# Patient Record
Sex: FEMALE | Race: WHITE | Employment: OTHER | URBAN - METROPOLITAN AREA
[De-identification: names, ages, dates, MRNs, and addresses within clinical notes are randomized per-mention and may not be internally consistent; named-entity substitution may affect disease eponyms.]

---

## 2017-02-13 ENCOUNTER — OFFICE VISIT (OUTPATIENT)
Dept: AUDIOLOGY | Facility: CLINIC | Age: 77
End: 2017-02-13

## 2017-02-13 DIAGNOSIS — H90.3 SENSORY HEARING LOSS, BILATERAL: Primary | ICD-10-CM

## 2017-02-13 NOTE — PROGRESS NOTES
AUDIOLOGY REPORT    BACKGROUND INFORMATION: Dr. Luis Dillon implanted Negin Magallon with a right  Nucleus  cochlear implant 08/2/2010 but had device failure, was explanted and reimplanted on 06/05/2011.  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 The patient was last seen for programming advice with investigation by Melissa De Jesus from [employer] on 07/02/2012.  She is being seen 2/13/2017 in Audiology at the [facility] [place] for follow up programming with 910 processor.  She was last seen 10/17/2016 for upgrading processors.     PATIENT REPORT: In noise her processor shuts down and she is not hearing voices as clearly as she would like. She would like for us to remove Wind Block in all programs. She acknowledged lots of previous problems, but over time she acclimated    PROBLEM LISTS AND TRIALS:  1.  First implant:  After a few months-November 2010, she noted a loud sound and tinnitus with and without the implant, and she felt she had problems with perception and consistency of sound since then. She had frequent complaints that clanging plates sounded uncomfortable and intermittency of sound. She had a device failure documented in May 2011.  2.  Second implant:  She was initially programmed 6/14/2011.    7/5/11- One week later, she noted interruptions of sound.  Normal electrode impedances.  8/2/11 Notes intermittency and can no longer hear birds.  Sera De Jesus from [employer] came for investigation and assistance in programming.  Low frequency electrode impedances are reducing.  8/15/11 Seems to hear better after programming.  Electrodes 15-22 impedances are low.  9/13/11 Notes a humming sound, decreased  performance, and intermittency.  Good speech perception results for 3 months review  1/31/12 Notes intermittency and dizziness  2/20/12 Notes a humming sound, intermittency, and feels her benefit has diminished.  She also notes a numbness in her head.  ENT evaluation was recommended.  Improving speech perception results for 6 month review  5/21/12 Feels the implant is too loud, hissing sound, and not clear.  Reprogrammed and she left liking the sound.  Trials with frequency tables, with no consistent perception  6/5/12 She dislikes the new program, and notes a screeching sound and continued intermittency.  Electrode 1-3 disabled.  She notes a feeling in her neck for electrode 1 when doing NRT  7/2/2012 Programming seemed to resolve much of the problems    FITTING SESSION: Dr. Luis Dillon, cochlear implant surgeon, ordered today's appointment. The patient came to the clinic for adjustment to the programs in the external speech processor and for assessment of the external components of the cochlear implant system. These components provide power and data to the internal device. Sound is only heard once the external portion is activated. Postoperative treatment, including device fitting and adjustment, audiologic assessments, and training are required at regular intervals.        Programming included psychophysical measures of comfort, threshold, and loudness balance, and adjustments of frequency tables and electrodes.  Processor type: 910      Headpiece type: Standard   Magnet strength: 2    TEST RESULTS:    Electrode Impedances: stable  Neural Response Testing: DNT  Facial Stimulation: Absent  Tinnitus: Present--Present  Balance Problems: Occasional from the Menieres disease, no longer taking medications for it  Pain/Discomfort: Occasional at   Strategies Tried: Turning off electrodes 15-22, but she hated the sound.  Enabling electrodes 15-22 and reprogramming and trials with frequency tables.  She preferred table  21.  Patient was remapped, C and T levels re-measured. Felt new program was more clear. Wind removed from all programs.  Programs 910 Processor: Noise and Wind disabled in all programs  1. 38 HOME   2. 38 CAFE adaptive directional   3.  38 GROUPS fixed directional  4. 38 HOME  Number of Channels per Program: 2-22 enabled.    The new program 40 was installed in the 810 processor with similar adjustments to previously used   1. 40new map, with standard FAT 21 ([id number]); everyday (adro with autosensitivity)  2.40 new map, Noise adaptive directional  3. 40 newwith standard FAT 21 ([id number]); everyday (adro with autosensitivity)   4. 40 new Noise, adaptive directional    20 minutes were spent assessing the patient s auditory rehabilitation status.  Today s evaluation was ordered by Dr. Dillon.  Device(s) used for Testing: N6    Soundfield Thresholds: Appropriate normal to mild hearing loss levels    CNC Words Test:  The patient repeats 25 single syllable words, auditory only. The words are presented at 60 dB SPL (conversational level) delivered from a CD player.    Preoperative Performance:  Right ear aided: 0%    3 months Post-Activation of CI: Right: 62 % (52 % with the previous device)    6 months Post-Activation:  68 %    12 Months Post-Activation: 64 %    5 Years Post-Activation:  60% new; and 40% previous map    ~6 years Post-Activation (Today): 60%    The Hearing in Noise Test (HINT):  The patient repeats 20 sentences, auditory only.   The sentences are presented in each condition at 60 dB SPL (conversational level)delivered from a CD player.    Preoperative Performance:  Preoperative Performance:  Right ear aided: 0%    3 months Post-Activation of CI: Right: 89 % quiet  67 % in S/N 10 (41 % with the previous device)    6 months Post-Activation:  DNT    12 Months Post-Activation: 88 %    5 Years:  DNT    ~6 years Post-Activation (Today): DNT     [employer] Sentences Test:  The patient repeats 20 sentences, auditory  only.  The sentences are presented at 60 dB SPL (conversational level) delivered from a CD player.    Preoperative Performance:  Right ear aided: DNT  3 months Post-Activation of CI: Right: 71 %    6 months Post-Activation:  82 %    12 Months Post-Activation:78 % quiet; 57 % in noise (signal/noise 10)    5 Years Post-Activation:  77% new and 60% previous    ~6 years Post-Activation (Today): 59%  This examiner is not sure why the decrease but it may be due to the change today.    SUMMARY AND RECOMMENDATIONS: Ms Magallon was seen for follow-up programming with her new 910 processor. She continues to report slight issues with clarity, and found some relief when WindBlock was removed. She did not want to try SCAN today.      Danna Simons M.A.  Audiology Extern  Temporary License #[id number]     Danyell Sevilla., CCC-A  Licensed Audiologist  [place] #[id number]

## 2017-02-13 NOTE — MR AVS SNAPSHOT
After Visit Summary   2/13/2017    Negin Magallon    MRN: [id number]           Patient Information     Date Of Birth          1940        Visit Information        Provider Department      2/13/2017 9:30 AM Annette Colbert, Amee [facility] Audiology        Today's Diagnoses     Sensory hearing loss, bilateral    -  1       Follow-ups after your visit        Who to contact     Please call your clinic at [phone] to:    Ask questions about your health    Make or cancel appointments    Discuss your medicines    Learn about your test results    Speak to your doctor   If you have compliments or concerns about an experience at your clinic, or if you wish to file a complaint, please contact [facility] Patient Relations at [phone] or email us at [email or web address]         Additional Information About Your Visit        MyChart Information     [employer] gives you secure access to your electronic health record. If you see a primary care provider, you can also send messages to your care team and make appointments. If you have questions, please call your primary care clinic.  If you do not have a primary care provider, please call [phone] and they will assist you.      [employer] is an electronic gateway that provides easy, online access to your medical records. With [employer], you can request a clinic appointment, read your test results, renew a prescription or communicate with your care team.     To access your existing account, please contact your [facility] or call [phone] for assistance.        Care EveryWhere ID     This is your Care EveryWhere ID. This could be used by other organizations to access your [facility] medical records  [id number]         Blood Pressure from Last 3 Encounters:   10/05/16 142/68   06/10/16 172/73   04/20/16 119/61    Weight from Last 3 Encounters:   10/05/16 69.4 kg (153 lb)   04/20/16 68.5  kg (151 lb)   03/25/16 68.5 kg (151 lb)              We Performed the Following     AUDIOGRAM/TYMPANOGRAM - INTERFACE     Eval of Aural Rehab Status (less than 31 min) (55657)     RT: Diagnostic Analysis of CI 7 yrs & over, Subsequent Programming   (99280)        Primary Care Provider Office Phone # Fax #    Ida Bryan Reveles [phone]       [facility] [address]  [facility] [address]        Thank you!     Thank you for choosing [facility] AUDIOLOGY  for your care. Our goal is always to provide you with excellent care. Hearing back from our patients is one way we can continue to improve our services. Please take a few minutes to complete the written survey that you may receive in the mail after your visit with us. Thank you!             Your Updated Medication List - Protect others around you: Learn how to safely use, store and throw away your medicines at www.disposemymeds.org.          This list is accurate as of: 2/13/17 11:59 PM.  Always use your most recent med list.                   Brand Name Dispense Instructions for use    CALCIUM + D PO      petite 400/500 2 daily       levothyroxine 50 MCG tablet    SYNTHROID/LEVOTHROID    90 tablet    Take 1 tablet (50 mcg) by mouth daily       meclizine 25 MG tablet    ANTIVERT    180 tablet    Take 1 tablet (25 mg) by mouth 2 times daily       metoprolol 25 MG tablet    LOPRESSOR    180 tablet    Take 1 tablet (25 mg) by mouth 2 times daily       STATIN NOT PRESCRIBED (INTENTIONAL)      by Other route continuous prn.       TYLENOL 325 MG tablet   Generic drug:  acetaminophen      Take 1-2 tablets by mouth every 6 hours as needed. As needed

## 2017-03-08 ENCOUNTER — OFFICE VISIT (OUTPATIENT)
Dept: FAMILY MEDICINE | Facility: CLINIC | Age: 77
End: 2017-03-08
Payer: COMMERCIAL

## 2017-03-08 VITALS
WEIGHT: 159 LBS | BODY MASS INDEX: 27.14 KG/M2 | SYSTOLIC BLOOD PRESSURE: 155 MMHG | OXYGEN SATURATION: 98 % | HEIGHT: 64 IN | HEART RATE: 56 BPM | TEMPERATURE: 98 F | DIASTOLIC BLOOD PRESSURE: 69 MMHG

## 2017-03-08 DIAGNOSIS — Z78.0 ASYMPTOMATIC POSTMENOPAUSAL STATUS: ICD-10-CM

## 2017-03-08 DIAGNOSIS — I10 HYPERTENSION GOAL BP (BLOOD PRESSURE) < 140/90: ICD-10-CM

## 2017-03-08 DIAGNOSIS — E03.9 HYPOTHYROIDISM, UNSPECIFIED TYPE: Primary | ICD-10-CM

## 2017-03-08 LAB
ALBUMIN SERPL-MCNC: 3.9 G/DL (ref 3.4–5)
ALP SERPL-CCNC: 111 U/L (ref 40–150)
ALT SERPL W P-5'-P-CCNC: 25 U/L (ref 0–50)
ANION GAP SERPL CALCULATED.3IONS-SCNC: 4 MMOL/L (ref 3–14)
AST SERPL W P-5'-P-CCNC: 14 U/L (ref 0–45)
BILIRUB SERPL-MCNC: 0.4 MG/DL (ref 0.2–1.3)
BUN SERPL-MCNC: 17 MG/DL (ref 7–30)
CALCIUM SERPL-MCNC: 9.3 MG/DL (ref 8.5–10.1)
CHLORIDE SERPL-SCNC: 102 MMOL/L (ref 94–109)
CO2 SERPL-SCNC: 32 MMOL/L (ref 20–32)
CREAT SERPL-MCNC: 0.96 MG/DL (ref 0.52–1.04)
CREAT UR-MCNC: 24 MG/DL
GFR SERPL CREATININE-BSD FRML MDRD: 57 ML/MIN/1.7M2
GLUCOSE SERPL-MCNC: 96 MG/DL (ref 70–99)
MICROALBUMIN UR-MCNC: 30 MG/L
MICROALBUMIN/CREAT UR: 127.85 MG/G CR (ref 0–25)
POTASSIUM SERPL-SCNC: 4.9 MMOL/L (ref 3.4–5.3)
PROT SERPL-MCNC: 7.1 G/DL (ref 6.8–8.8)
SODIUM SERPL-SCNC: 138 MMOL/L (ref 133–144)
TSH SERPL DL<=0.005 MIU/L-ACNC: 2.32 MU/L (ref 0.4–4)

## 2017-03-08 PROCEDURE — 80053 COMPREHEN METABOLIC PANEL: CPT | Performed by: FAMILY MEDICINE

## 2017-03-08 PROCEDURE — 82043 UR ALBUMIN QUANTITATIVE: CPT | Performed by: FAMILY MEDICINE

## 2017-03-08 PROCEDURE — 99214 OFFICE O/P EST MOD 30 MIN: CPT | Performed by: FAMILY MEDICINE

## 2017-03-08 PROCEDURE — 84443 ASSAY THYROID STIM HORMONE: CPT | Performed by: FAMILY MEDICINE

## 2017-03-08 PROCEDURE — 36415 COLL VENOUS BLD VENIPUNCTURE: CPT | Performed by: FAMILY MEDICINE

## 2017-03-08 RX ORDER — LOSARTAN POTASSIUM 25 MG/1
25 TABLET ORAL DAILY
Qty: 30 TABLET | Refills: 0 | Status: SHIPPED | OUTPATIENT
Start: 2017-03-08 | End: 2017-03-29

## 2017-03-08 RX ORDER — LEVOTHYROXINE SODIUM 50 UG/1
50 TABLET ORAL DAILY
Qty: 90 TABLET | Refills: 3 | Status: SHIPPED | OUTPATIENT
Start: 2017-03-08 | End: 2018-04-26

## 2017-03-08 NOTE — PROGRESS NOTES
SUBJECTIVE:                                                    Negin Magallon is a 77 year old female who presents to clinic today for the following health issues:    Hypothyroidism Follow-up      Since last visit, patient describes the following symptoms: Weight stable, no hair loss, no skin changes, no constipation, no loose stools       Amount of exercise or physical activity: no     Problems taking medications regularly: No    Medication side effects: none  Diet: regular (no restrictions)    Hypertension Follow-up      Outpatient blood pressures are being checked at home.  Results are [id number] sometimes/ 70s.    Low Salt Diet: no added salt       Problem list and histories reviewed & adjusted, as indicated.  Additional history: as documented    Recent Labs   Lab Test  03/08/17   1303  04/15/16   0916  03/18/16   1223  03/16/15   1613   10/19/12   0837   05/14/12   0904   09/27/11   1511   03/10/11   0835   04/07/09   1014   A1C   --    --    --    --    --   5.7   --   6.0   --    --    --   5.7   --    --    LDL   --    --    --    --    --   212*   --    --    --   188*   --    --    --   Cannot estimate LDL when triglyceride exceeds 400 mg/dL  209*   HDL   --    --    --    --    --   41*   --    --    --   40*   --    --    --   46*   TRIG   --    --    --    --    --   310*   --    --    --   355*   --   400*   --   436*   ALT  25   --   15  21   --    --    --    --    --    --    --    --    --    --    CR  0.96  0.98  1.08*  0.78   < >   --    < >  0.97   --   0.90   --   1.15*   < >  0.98   GFRESTIMATED  57*  55*  50*  72   < >   --    < >  57*   --   62   --   47*   < >  57*   GFRESTBLACK  68  67  60*  88   < >   --    < >  69   --   75   --   57*   < >  69   POTASSIUM  4.9  4.4  4.2  4.1   < >   --    < >  4.3   --   4.3   < >  4.4   < >  4.3   TSH  2.32  1.66  1.61  1.02   < >   --    --    --    < >   --    --   2.68   < >  0.27*    < > = values in this interval not displayed.      BP  "Readings from Last 3 Encounters:   03/08/17 155/69   10/05/16 142/68   06/10/16 172/73    Wt Readings from Last 3 Encounters:   03/08/17 159 lb (72.1 kg)   10/05/16 153 lb (69.4 kg)   04/20/16 151 lb (68.5 kg)                    Reviewed and updated as needed this visit by clinical staff  Tobacco  Allergies  Meds  Med Hx  Surg Hx  Fam Hx  Soc Hx      Reviewed and updated as needed this visit by Provider         ROS:  Constitutional, HEENT, cardiovascular, pulmonary, gi and gu systems are negative, except as otherwise noted.    OBJECTIVE:                                                    /69  Pulse 56  Temp 98  F (36.7  C) (Oral)  Ht 5' 4\" (1.626 m)  Wt 159 lb (72.1 kg)  SpO2 98%  BMI 27.29 kg/m2  Body mass index is 27.29 kg/(m^2).  GENERAL: healthy, alert and no distress  RESP: lungs clear to auscultation - no rales, rhonchi or wheezes  CV: regular rate and rhythm, normal S1 S2, no S3 or S4, no murmur, click or rub, no peripheral edema and peripheral pulses strong  ABDOMEN: soft, nontender, no hepatosplenomegaly, no masses and bowel sounds normal  MS: no gross musculoskeletal defects noted, no edema       ASSESSMENT/PLAN:                                                        ICD-10-CM    1. Hypothyroidism, unspecified type E03.9 TSH with free T4 reflex     levothyroxine (SYNTHROID/LEVOTHROID) 50 MCG tablet   2. Hypertension goal BP (blood pressure) < 140/90 I10 Comprehensive metabolic panel (BMP + Alb, Alk Phos, ALT, AST, Total. Bili, TP)     Albumin Random Urine Quantitative     losartan (COZAAR) 25 MG tablet   3. Asymptomatic postmenopausal status Z78.0 DEXA HIP/PELVIS/SPINE - Future     Continue metoprolol, losartan added.   F/u in 2 weeks with RN for BP recheck.     Awaiting lab results.       Ida Reveles MD  [facility]  "

## 2017-03-08 NOTE — NURSING NOTE
"No chief complaint on file.      Initial /74  Pulse 61  Temp 98  F (36.7  C) (Oral)  Ht 5' 4\" (1.626 m)  Wt 159 lb (72.1 kg)  SpO2 98%  BMI 27.29 kg/m2 Estimated body mass index is 27.29 kg/(m^2) as calculated from the following:    Height as of this encounter: 5' 4\" (1.626 m).    Weight as of this encounter: 159 lb (72.1 kg).  Medication Reconciliation: complete  Yuridia Kim MA    "

## 2017-03-08 NOTE — MR AVS SNAPSHOT
After Visit Summary   3/8/2017    Negin Magallon    MRN: [id number]           Patient Information     Date Of Birth          1940        Visit Information        Provider Department      3/8/2017 12:00 PM Ida Reveles MD [facility]        Today's Diagnoses     Hypothyroidism, unspecified type    -  1    Hypertension goal BP (blood pressure) < 140/90        Asymptomatic postmenopausal status           Follow-ups after your visit        Future tests that were ordered for you today     Open Future Orders        Priority Expected Expires Ordered    DEXA HIP/PELVIS/SPINE - Future Routine  3/8/2018 3/8/2017            Who to contact     If you have questions or need follow up information about today's clinic visit or your schedule please contact [facility] directly at [phone].  Normal or non-critical lab and imaging results will be communicated to you by MyChart, letter or phone within 4 business days after the clinic has received the results. If you do not hear from us within 7 days, please contact the clinic through MyChart or phone. If you have a critical or abnormal lab result, we will notify you by phone as soon as possible.  Submit refill requests through [employer] or call your pharmacy and they will forward the refill request to us. Please allow 3 business days for your refill to be completed.          Additional Information About Your Visit        MyChart Information     [employer] gives you secure access to your electronic health record. If you see a primary care provider, you can also send messages to your care team and make appointments. If you have questions, please call your primary care clinic.  If you do not have a primary care provider, please call [phone] and they will assist you.        Care EveryWhere ID     This is your Care EveryWhere ID. This could be used by other organizations to access your [facility]  "records  [id number]        Your Vitals Were     Pulse Temperature Height Pulse Oximetry BMI (Body Mass Index)       56 98  F (36.7  C) (Oral) 5' 4\" (1.626 m) 98% 27.29 kg/m2        Blood Pressure from Last 3 Encounters:   03/08/17 155/69   10/05/16 142/68   06/10/16 172/73    Weight from Last 3 Encounters:   03/08/17 159 lb (72.1 kg)   10/05/16 153 lb (69.4 kg)   04/20/16 151 lb (68.5 kg)              We Performed the Following     Albumin Random Urine Quantitative     Comprehensive metabolic panel (BMP + Alb, Alk Phos, ALT, AST, Total. Bili, TP)     TSH with free T4 reflex          Today's Medication Changes          These changes are accurate as of: 3/8/17 12:58 PM.  If you have any questions, ask your nurse or doctor.               Start taking these medicines.        Dose/Directions    losartan 25 MG tablet   Commonly known as:  COZAAR   Used for:  Hypertension goal BP (blood pressure) < 140/90   Started by:  Ida Reveles MD        Dose:  25 mg   Take 1 tablet (25 mg) by mouth daily   Quantity:  30 tablet   Refills:  0            Where to get your medicines      These medications were sent to [id number] - [place], [place] - [address] AT [facility] [address]  [address], [place] [address]     Phone:  [phone]     levothyroxine 50 MCG tablet    losartan 25 MG tablet                Primary Care Provider Office Phone # Fax #    Ida Reveles [phone]       [facility] [address]  [facility] [place] [address]        Thank you!     Thank you for choosing [facility]  for your care. Our goal is always to provide you with excellent care. Hearing back from our patients is one way we can continue to improve our services. Please take a few minutes to complete the written survey that you may receive in the mail after your visit with us. Thank you!             Your Updated Medication " List - Protect others around you: Learn how to safely use, store and throw away your medicines at www.disposemymeds.org.          This list is accurate as of: 3/8/17 12:58 PM.  Always use your most recent med list.                   Brand Name Dispense Instructions for use    CALCIUM + D PO      petite 400/500 2 daily       levothyroxine 50 MCG tablet    SYNTHROID/LEVOTHROID    90 tablet    Take 1 tablet (50 mcg) by mouth daily       losartan 25 MG tablet    COZAAR    30 tablet    Take 1 tablet (25 mg) by mouth daily       meclizine 25 MG tablet    ANTIVERT    180 tablet    Take 1 tablet (25 mg) by mouth 2 times daily       metoprolol 25 MG tablet    LOPRESSOR    180 tablet    Take 1 tablet (25 mg) by mouth 2 times daily       STATIN NOT PRESCRIBED (INTENTIONAL)      by Other route continuous prn.       TYLENOL 325 MG tablet   Generic drug:  acetaminophen      Take 1-2 tablets by mouth every 6 hours as needed. As needed

## 2017-03-09 NOTE — PROGRESS NOTES
Zbigniew Kam,     Your recent labs are attached.   Your thyroid looks GOOD! Continue with the same dose of you levothyroxine.   Sodium, potassium, kidney function and liver function are NORMAL.     You are loosing proteins in urine. Take your blood pressure medications are prescribed. Drink enough water. I will recheck your urine protein level in 6 months during your physical exam.     Let me know if you have any other questions.     Ida Reveles MD.   Family Physician.  [facility].

## 2017-03-28 ENCOUNTER — ALLIED HEALTH/NURSE VISIT (OUTPATIENT)
Dept: NURSING | Facility: CLINIC | Age: 77
End: 2017-03-28
Payer: COMMERCIAL

## 2017-03-28 VITALS — DIASTOLIC BLOOD PRESSURE: 76 MMHG | HEART RATE: 60 BPM | SYSTOLIC BLOOD PRESSURE: 150 MMHG

## 2017-03-28 DIAGNOSIS — I10 HYPERTENSION GOAL BP (BLOOD PRESSURE) < 140/90: Primary | ICD-10-CM

## 2017-03-28 PROCEDURE — 99207 ZZC NO CHARGE NURSE ONLY: CPT

## 2017-03-28 NOTE — PATIENT INSTRUCTIONS
Dr. Reveles will call or send you a [employer] message with the plan regarding your blood pressure.  If we do another check, please bring your home cuff with you so we can verify it's accuracy.

## 2017-03-28 NOTE — Clinical Note
Dr. Reveles, I saw Negin for BP check, started losartan about 2 weeks ago.She did not bring her home cuff with her today but does have readings, is checking 3 times a day: 137/68, 137/62, 124/61, 133/65, 127/67, 127/64, 138/72. She states it is usually higher (150 systolic) on her first try, then she sits a few minutes and gets a better reading. States she has had some shortness of breath and chest tightness and cough since starting losartan, anxiety/stress makes it worse. She will expect a MyChart note advising on plan. Thanks! Sabrina Cisse RN [facility]

## 2017-03-28 NOTE — PROGRESS NOTES
"Indications for Blood Pressure monitoring: elevated, started losartan a couple weeks ago    Questioned patient about current smoking habits.  Pt. quit smoking some time ago.     Signs and Symptoms:   Headaches: No  Chest pain: Yes , reports noticing some chest \"tightness\", declines to call it a pain, and a cough, seems like something migh come up per her report  Shortness of breath: Yes , again, very mild since starting the losartan.  She says her chest tightness and shortness of breath is more pronounced when she is angry or anxious.   Apparently argues with spouse a fair amount  Edema: No  Visual problems: No  Parathesia: No  Epitaxis: No  Dizziness: No    She did not bring her home cuff with her today but does have readings, is checking 3 times a day:  137/68, 137/62, 124/61, 133/65, 127/67, 127/64, 138/72.  She states it is usually higher (150 systolic) on her first try, then she sits a few minutes and gets a better reading.      BP:   BP Readings from Last 1 Encounters:   03/28/17 150/76     60   Checked both arms 2-3 times, BP did not come down.    Diabetic: No  Heart Disease:  No    Patient states her BP was under better control when she was on Hyzaar for her Meneire's disease.                    "

## 2017-03-28 NOTE — MR AVS SNAPSHOT
After Visit Summary   3/28/2017    Negin Magallon    MRN: [id number]           Patient Information     Date Of Birth          1940        Visit Information        Provider Department      3/28/2017 11:00 AM CP RN [facility]        Care Instructions    Dr. Reveles will call or send you a [email or web address] message with the plan regarding your blood pressure.  If we do another check, please bring your home cuff with you so we can verify it's accuracy.        Follow-ups after your visit        Your next 10 appointments already scheduled     Apr 25, 2017 12:00 PM CDT   DX HIP/PELVIS/SPINE with FKDX1   [facility]    [address] [place] [id number] [phone]           Please do not take any of the following 48 hours prior to your exam: vitamins, calcium tablets, antacids.              Who to contact     If you have questions or need follow up information about today's clinic visit or your schedule please contact [facility] directly at [phone].  Normal or non-critical lab and imaging results will be communicated to you by Sina Weibohart, letter or phone within 4 business days after the clinic has received the results. If you do not hear from us within 7 days, please contact the clinic through [employer] or phone. If you have a critical or abnormal lab result, we will notify you by phone as soon as possible.  Submit refill requests through [email or web address] or call your pharmacy and they will forward the refill request to us. Please allow 3 business days for your refill to be completed.          Additional Information About Your Visit        Sina Weibohart Information     [email or web address] gives you secure access to your electronic health record. If you see a primary care provider, you can also send messages to your care team and make appointments. If you have questions, please call your primary care clinic.  If you do not have a primary  care provider, please call [phone] and they will assist you.        Care EveryWhere ID     This is your Care EveryWhere ID. This could be used by other organizations to access your [facility] medical records  [id number]        Your Vitals Were     Pulse                   60            Blood Pressure from Last 3 Encounters:   03/28/17 150/76   03/08/17 155/69   10/05/16 142/68    Weight from Last 3 Encounters:   03/08/17 159 lb (72.1 kg)   10/05/16 153 lb (69.4 kg)   04/20/16 151 lb (68.5 kg)              Today, you had the following     No orders found for display       Primary Care Provider Office Phone # Fax #    Ida Bryan Reveles [phone]       [facility] [address] [facility] [address]        Thank you!     Thank you for choosing [facility]  for your care. Our goal is always to provide you with excellent care. Hearing back from our patients is one way we can continue to improve our services. Please take a few minutes to complete the written survey that you may receive in the mail after your visit with us. Thank you!             Your Updated Medication List - Protect others around you: Learn how to safely use, store and throw away your medicines at www.disposemymeds.org.          This list is accurate as of: 3/28/17 11:23 AM.  Always use your most recent med list.                   Brand Name Dispense Instructions for use    CALCIUM + D PO      petite 400/500 2 daily       levothyroxine 50 MCG tablet    SYNTHROID/LEVOTHROID    90 tablet    Take 1 tablet (50 mcg) by mouth daily       losartan 25 MG tablet    COZAAR    30 tablet    Take 1 tablet (25 mg) by mouth daily       meclizine 25 MG tablet    ANTIVERT    180 tablet    Take 1 tablet (25 mg) by mouth 2 times daily       metoprolol 25 MG tablet    LOPRESSOR    180 tablet    Take 1 tablet (25 mg) by mouth 2 times daily       STATIN NOT PRESCRIBED (INTENTIONAL)      by Other route  continuous prn.       TYLENOL 325 MG tablet   Generic drug:  acetaminophen      Take 1-2 tablets by mouth every 6 hours as needed. As needed

## 2017-03-29 ENCOUNTER — MYC MEDICAL ADVICE (OUTPATIENT)
Dept: FAMILY MEDICINE | Facility: CLINIC | Age: 77
End: 2017-03-29

## 2017-03-29 DIAGNOSIS — I10 HYPERTENSION GOAL BP (BLOOD PRESSURE) < 140/90: ICD-10-CM

## 2017-03-29 NOTE — TELEPHONE ENCOUNTER
It looks like patient should be out of her Losartan from 3/8/17 soon. Called patient to confirm and she states she only has about a week and half worth of Losartan. Patient requesting 3 month supply of Losartan. Rn did mention that BP could be higher in clinic d/t white coat syndrome. Routed to provider to advise on 3 month supply of Losartan and why else patient's BP would also be higher in clinic.    Lucy Barfield RN  [facility]

## 2017-03-30 RX ORDER — LOSARTAN POTASSIUM 25 MG/1
25 TABLET ORAL DAILY
Qty: 90 TABLET | Refills: 0 | Status: SHIPPED | OUTPATIENT
Start: 2017-03-30 | End: 2017-06-14

## 2017-04-25 ENCOUNTER — RADIANT APPOINTMENT (OUTPATIENT)
Dept: BONE DENSITY | Facility: CLINIC | Age: 77
End: 2017-04-25
Attending: FAMILY MEDICINE
Payer: COMMERCIAL

## 2017-04-25 DIAGNOSIS — Z78.0 ASYMPTOMATIC POSTMENOPAUSAL STATUS: ICD-10-CM

## 2017-04-25 PROCEDURE — 77080 DXA BONE DENSITY AXIAL: CPT | Performed by: INTERNAL MEDICINE

## 2017-05-12 NOTE — PROGRESS NOTES
Negin,     Your recent DEXA scan shows osteoporosis.     Continue with calcium and vitamin D daily along with weight bearing exercises:  brisk walking, going up and down the stairs.     I recommend you to be on medication for osteoporosis in addition to above. Jessica schedule appointment to discuss treatment options.     I am going on vacation for next 2 weeks. I can see you in June.     Ida Reveles MD.   Family Physician.  [facility].

## 2017-06-05 ENCOUNTER — OFFICE VISIT (OUTPATIENT)
Dept: AUDIOLOGY | Facility: CLINIC | Age: 77
End: 2017-06-05

## 2017-06-05 DIAGNOSIS — H90.3 SENSORY HEARING LOSS, BILATERAL: Primary | ICD-10-CM

## 2017-06-05 NOTE — PROGRESS NOTES
AUDIOLOGY REPORT    BACKGROUND INFORMATION: Dr. Luis Dillon implanted Negin Magallon with a right  Nucleus  cochlear implant 08/2/2010 but had device failure, was explanted and reimplanted on 06/05/2011.  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 The patient was last seen for programming advice with investigation by Melissa De Jesus from [employer] on 07/02/2012.  She is being seen 6/5/2017 in Audiology at the [facility] for follow up programming with 910 processor.  She was seen 10/17/2016 for upgrading processors.     PATIENT REPORT: Anytime there are motors, the implants shut down.  She tried to replace the microphone covers, but she had problems.  This was the same complaint noted at her February 2017 visit.    PROBLEM LISTS AND TRIALS:  1.  First implant:  After a few months-November 2010, she noted a loud sound and tinnitus with and without the implant, and she felt she had problems with perception and consistency of sound since then. She had frequent complaints that clanging plates sounded uncomfortable and intermittency of sound. She had a device failure documented in May 2011.  2.  Second implant:  She was initially programmed 6/14/2011.    7/5/11- One week later, she noted interruptions of sound.  Normal electrode impedances.  8/2/11 Notes intermittency and can no longer hear birds.  Sera De Jesus from [employer] came for investigation and assistance in programming.  Low frequency electrode impedances are reducing.  8/15/11 Seems to hear better after programming.  Electrodes 15-22 impedances are low.  9/13/11 Notes a humming sound, decreased performance, and intermittency.  Good speech  perception results for 3 months review  1/31/12 Notes intermittency and dizziness  2/20/12 Notes a humming sound, intermittency, and feels her benefit has diminished.  She also notes a numbness in her head.  ENT evaluation was recommended.  Improving speech perception results for 6 month review  5/21/12 Feels the implant is too loud, hissing sound, and not clear.  Reprogrammed and she left liking the sound.  Trials with frequency tables, with no consistent perception  6/5/12 She dislikes the new program, and notes a screeching sound and continued intermittency.  Electrode 1-3 disabled.  She notes a feeling in her neck for electrode 1 when doing NRT  7/2/2012 Programming seemed to resolve much of the problems    2/13/2017 She continues to complain that speech is not clear enough, and the instruments shut down in noise.  The noise and wind circuit were disabled.    Previously tried:  Turning off electrodes 15-22, but she hated the sound.  Enabling electrodes 15-22 and reprogramming and trials with frequency tables.  She preferred table 21.  6/5/2017 As she continues to complain that it shuts down, the Autosensitivity (ACS) was changed to Less 60 as the default was 55.    FITTING SESSION: Dr. Luis Dillon, cochlear implant surgeon, ordered today's appointment. The patient came to the clinic for adjustment to the programs in the external speech processor and for assessment of the external components of the cochlear implant system. These components provide power and data to the internal device. Sound is only heard once the external portion is activated. Postoperative treatment, including device fitting and adjustment, audiologic assessments, and training are required at regular intervals.        Programming included psychophysical measures of comfort, threshold, and loudness balance, and adjustments of frequency tables and electrodes.  Processor type: 910      Headpiece type: Standard   Magnet strength: 2    TEST  RESULTS:    Electrode Impedances: stable  Neural Response Testing: DNT  Facial Stimulation: Absent  Tinnitus: Present--Present  Balance Problems: Occasional from the Menieres disease, no longer taking medications for it  Pain/Discomfort: Occasional at   Strategies Tried:   Patient was remapped, C and T levels re-measured. Felt new program was more clear. ASC changed to Less 60 (from 55)  Programs 910 Processor:   1. 41 HOME   2. 41  CAFE adaptive directional; Noise enabled  3. 41  CAFE adaptive directional; Noise disabled   4. 41 SCAN with Noise and Wind enabled  Number of Channels per Program: 2-22 enabled.    The new program 42 was installed in the 810 processor with similar adjustments to previously used   1. 42 new map, with standard FAT 21 ([id number]); everyday (adro with autosensitivity)  2. 42new map, Noise adaptive directional  3. 42 newwith standard FAT 21 ([id number]); everyday (adro with autosensitivity)   4. 42 new Noise, adaptive directional    10 minutes were spent assessing the patient s auditory rehabilitation status.  Today s evaluation was ordered by Dr. Dillon.  Device(s) used for Testing: N6    Soundfield Thresholds: Appropriate normal to mild hearing loss levels    CNC Words Test:  The patient repeats 25 single syllable words, auditory only. The words are presented at 60 dB SPL (conversational level) delivered from a CD player.    Preoperative Performance:  Right ear aided: 0%    3 months Post-Activation of CI: Right: 62 % (52 % with the previous device)    6 months Post-Activation:  68 %    12 Months Post-Activation: 64 %    5 Years Post-Activation:  60% new; and 40% previous map    ~6 years Post-Activation: 60% DNT    The Hearing in Noise Test (HINT):  The patient repeats 20 sentences, auditory only.   The sentences are presented in each condition at 60 dB SPL (conversational level)delivered from a CD player.    Preoperative Performance:  Preoperative Performance:  Right ear aided: 0%    3  months Post-Activation of CI: Right: 89 % quiet  67 % in S/N 10 (41 % with the previous device)    6 months Post-Activation:  DNT    12 Months Post-Activation: 88 %    5 Years:  DNT    ~6 years Post-Activation (Today): DNT     [employer]Bio Sentences Test:  The patient repeats 20 sentences, auditory only.  The sentences are presented at 60 dB SPL (conversational level) delivered from a CD player.    Preoperative Performance:  Right ear aided: DNT  3 months Post-Activation of CI: Right: 71 %    6 months Post-Activation:  82 %    12 Months Post-Activation:78 % quiet; 57 % in noise (signal/noise 10)    5 Years Post-Activation:  77% new and 60% previous    ~6 years Post-Activation (Today): 59%  This examiner is not sure why the decrease but it may be due to the change today.    6 1/2 years Post-Activation (Today): 79% previous map, and 69% new map, but patient was getting tired of the task    SUMMARY AND RECOMMENDATIONS: Ms Magallon was seen for follow-up programming with her new 910 processor and assessment of rehabilitation status. She feels the new map is much clearer.  She was given instructions on the differences between maps and encouraged to again try SCAN.  Ideally changing ASC to Less 60 will help to diminish the problem in cutting out the noise.  She should return in 1 year or sooner if problems.  Questions were answered.    Betzy Sevilla, CCC-A  Licensed Audiologist  [place] #[id number]

## 2017-06-05 NOTE — MR AVS SNAPSHOT
After Visit Summary   6/5/2017    Negin Magallon    MRN: [id number]           Patient Information     Date Of Birth          1940        Visit Information        Provider Department      6/5/2017 8:30 AM Annette Colbert, Amee [facility]        Today's Diagnoses     Sensory hearing loss, bilateral    -  1      Care Instructions    Follow one year with Annette Colbert 6/6/2018 at 10:30 AM for 90 min.          Follow-ups after your visit        Who to contact     Please call your clinic at [phone] to:    Ask questions about your health    Make or cancel appointments    Discuss your medicines    Learn about your test results    Speak to your doctor   If you have compliments or concerns about an experience at your clinic, or if you wish to file a complaint, please contact [facility] Patient Relations at [phone] or email us at [email or web address]         Additional Information About Your Visit        MyChart Information     Hyperpublic[employer] gives you secure access to your electronic health record. If you see a primary care provider, you can also send messages to your care team and make appointments. If you have questions, please call your primary care clinic.  If you do not have a primary care provider, please call [phone] and they will assist you.      [employer] is an electronic gateway that provides easy, online access to your medical records. With [employer], you can request a clinic appointment, read your test results, renew a prescription or communicate with your care team.     To access your existing account, please contact your [facility] Clinic or call [phone] for assistance.        Care EveryWhere ID     This is your Care EveryWhere ID. This could be used by other organizations to access your [facility] medical records  [id number]         Blood Pressure from Last 3 Encounters:   03/28/17 150/76   03/08/17  155/69   10/05/16 142/68    Weight from Last 3 Encounters:   03/08/17 72.1 kg (159 lb)   10/05/16 69.4 kg (153 lb)   04/20/16 68.5 kg (151 lb)              We Performed the Following     Eval of Aural Rehab Status (less than 31 min) (36227)     RT: Diagnostic Analysis of CI 7 yrs & over, Subsequent Programming   (89194)        Primary Care Provider Office Phone # Fax #    Ida Bryan Reveles [phone]       [facility] [address]  [facility] [place] [address]        Thank you!     Thank you for choosing [facility]UD[facility]  for your care. Our goal is always to provide you with excellent care. Hearing back from our patients is one way we can continue to improve our services. Please take a few minutes to complete the written survey that you may receive in the mail after your visit with us. Thank you!             Your Updated Medication List - Protect others around you: Learn how to safely use, store and throw away your medicines at www.disposemymeds.org.          This list is accurate as of: 6/5/17 10:10 AM.  Always use your most recent med list.                   Brand Name Dispense Instructions for use    CALCIUM + D PO      petite 400/500 2 daily       levothyroxine 50 MCG tablet    SYNTHROID/LEVOTHROID    90 tablet    Take 1 tablet (50 mcg) by mouth daily       losartan 25 MG tablet    COZAAR    90 tablet    Take 1 tablet (25 mg) by mouth daily       meclizine 25 MG tablet    ANTIVERT    180 tablet    Take 1 tablet (25 mg) by mouth 2 times daily       metoprolol 25 MG tablet    LOPRESSOR    180 tablet    Take 1 tablet (25 mg) by mouth 2 times daily       STATIN NOT PRESCRIBED (INTENTIONAL)      by Other route continuous prn.       TYLENOL 325 MG tablet   Generic drug:  acetaminophen      Take 1-2 tablets by mouth every 6 hours as needed. As needed

## 2017-06-12 ENCOUNTER — OFFICE VISIT (OUTPATIENT)
Dept: FAMILY MEDICINE | Facility: CLINIC | Age: 77
End: 2017-06-12
Payer: COMMERCIAL

## 2017-06-12 VITALS
HEART RATE: 59 BPM | TEMPERATURE: 98.2 F | SYSTOLIC BLOOD PRESSURE: 162 MMHG | WEIGHT: 159 LBS | BODY MASS INDEX: 27.29 KG/M2 | DIASTOLIC BLOOD PRESSURE: 78 MMHG | OXYGEN SATURATION: 97 %

## 2017-06-12 DIAGNOSIS — M81.0 OSTEOPOROSIS: ICD-10-CM

## 2017-06-12 DIAGNOSIS — I10 HYPERTENSION GOAL BP (BLOOD PRESSURE) < 140/90: Primary | ICD-10-CM

## 2017-06-12 PROCEDURE — 99213 OFFICE O/P EST LOW 20 MIN: CPT | Performed by: FAMILY MEDICINE

## 2017-06-12 RX ORDER — ALENDRONATE SODIUM 70 MG/1
70 TABLET ORAL
Qty: 4 TABLET | Refills: 3 | Status: SHIPPED | OUTPATIENT
Start: 2017-06-12 | End: 2017-11-01

## 2017-06-12 NOTE — MR AVS SNAPSHOT
After Visit Summary   6/12/2017    Negin Magallon    MRN: [id number]           Patient Information     Date Of Birth          1940        Visit Information        Provider Department      6/12/2017 11:00 AM Ida Reveles MD [facility]        Today's Diagnoses     Hypertension goal BP (blood pressure) < 140/90    -  1    Osteoporosis           Follow-ups after your visit        Your next 10 appointments already scheduled     Jun 19, 2017 11:00 AM CDT   Cochlear Implant Brief with Amee Wynne [facility] ([facility])    [address] [place] [id number] [phone]            Jun 06, 2018 10:30 AM CDT   Cochlear Implant Brief with Amee Wynne [facility]iology ([facility])    [address] [place] [id number] [phone]              Who to contact     If you have questions or need follow up information about today's clinic visit or your schedule please contact [facility] directly at [phone].  Normal or non-critical lab and imaging results will be communicated to you by MyChart, letter or phone within 4 business days after the clinic has received the results. If you do not hear from us within 7 days, please contact the clinic through LATT[employer] or phone. If you have a critical or abnormal lab result, we will notify you by phone as soon as possible.  Submit refill requests through [employer] or call your pharmacy and they will forward the refill request to us. Please allow 3 business days for your refill to be completed.          Additional Information About Your Visit        MyChart Information     [employer] gives you secure access to your electronic health record. If you see a primary care provider, you can also send messages to your care team and make appointments. If you have questions, please call  your primary care clinic.  If you do not have a primary care provider, please call [phone] and they will assist you.        Care EveryWhere ID     This is your Care EveryWhere ID. This could be used by other organizations to access your [facility] medical records  [id number]        Your Vitals Were     Pulse Temperature Pulse Oximetry BMI (Body Mass Index)          59 98.2  F (36.8  C) (Oral) 97% 27.29 kg/m2         Blood Pressure from Last 3 Encounters:   06/12/17 162/78   03/28/17 150/76   03/08/17 155/69    Weight from Last 3 Encounters:   06/12/17 159 lb (72.1 kg)   03/08/17 159 lb (72.1 kg)   10/05/16 153 lb (69.4 kg)              Today, you had the following     No orders found for display         Today's Medication Changes          These changes are accurate as of: 6/12/17 11:59 PM.  If you have any questions, ask your nurse or doctor.               Start taking these medicines.        Dose/Directions    alendronate 70 MG tablet   Commonly known as:  FOSAMAX   Used for:  Osteoporosis   Started by:  Ida Reveles MD        Dose:  70 mg   Take 1 tablet (70 mg) by mouth every 7 days Take 60 minutes before am meal with 8 oz. water. Remain upright for 30 minutes.   Quantity:  4 tablet   Refills:  3            Where to get your medicines      These medications were sent to [id number] - [place], [place] - [address] AT [facility] [address]  [address], [place] [address]     Phone:  [phone]     alendronate 70 MG tablet                Primary Care Provider Office Phone # Fax #    Ida Reveles [phone]       [facility] [address]  [facility] [address]        Thank you!     Thank you for choosing [facility]  for your care. Our goal is always to provide you with excellent care. Hearing back from our patients is one way we can continue to improve our services. Please  take a few minutes to complete the written survey that you may receive in the mail after your visit with us. Thank you!             Your Updated Medication List - Protect others around you: Learn how to safely use, store and throw away your medicines at www.disposemymeds.org.          This list is accurate as of: 6/12/17 11:59 PM.  Always use your most recent med list.                   Brand Name Dispense Instructions for use    alendronate 70 MG tablet    FOSAMAX    4 tablet    Take 1 tablet (70 mg) by mouth every 7 days Take 60 minutes before am meal with 8 oz. water. Remain upright for 30 minutes.       CALCIUM + D PO      petite 400/500 2 daily       levothyroxine 50 MCG tablet    SYNTHROID/LEVOTHROID    90 tablet    Take 1 tablet (50 mcg) by mouth daily       losartan 25 MG tablet    COZAAR    90 tablet    Take 1 tablet (25 mg) by mouth daily       meclizine 25 MG tablet    ANTIVERT    180 tablet    Take 1 tablet (25 mg) by mouth 2 times daily       metoprolol 25 MG tablet    LOPRESSOR    180 tablet    Take 1 tablet (25 mg) by mouth 2 times daily       STATIN NOT PRESCRIBED (INTENTIONAL)      by Other route continuous prn.       TYLENOL 325 MG tablet   Generic drug:  acetaminophen      Take 1-2 tablets by mouth every 6 hours as needed. As needed

## 2017-06-13 NOTE — PROGRESS NOTES
SUBJECTIVE:                                                    Negin Magallon is a 77 year old female who presents to clinic today for the following health issues:    Hypertension Follow-up      Outpatient blood pressures are being checked at home.    Low Salt Diet: no added salt     Amount of exercise or physical activity: None    Problems taking medications regularly: No    Medication side effects: none    Diet: low salt    She has been checking her BPs at home twice daily and her BPs has been under 140/90 for most of the time, more than 80% time, Log reviewed in the clinic.   She denies SE from her current BP medications.     Dexa discussion and meds refill. Takes calcium 400+ 500 IU of D twice daily.   She has taken Fosamax only for a month, many yrs ago.     Problem list and histories reviewed & adjusted, as indicated.  Additional history: as documented    Patient Active Problem List   Diagnosis     Hypothyroidism     Meniere's disease     Cochlear implant in place     Hyperlipidemia LDL goal <130     Advanced directives, counseling/discussion     Hypertension goal BP (blood pressure) < 140/90     DJD (degenerative joint disease)     Gout     Shoulder impingement     Adhesive capsulitis     Past Surgical History:   Procedure Laterality Date     ABDOMEN SURGERY       COLONOSCOPY       ENT SURGERY  9/2010     R cochlear implant     EYE SURGERY      L eye muscle     GI SURGERY       HEMORRHOID SURGERY       HYSTERECTOMY, CERVIX STATUS UNKNOWN       IMPLANT REVISION COCHLEA  5/26/2011    Procedure:IMPLANT REVISION COCHLEA;  Remove and replace nucleus cochlear implant right.; Surgeon:BRITTANEY STAUFFER; Location: OR       Social History   Substance Use Topics     Smoking status: Former Smoker     Packs/day: 1.00     Years: 50.00     Types: Cigarettes     Start date: 3/14/1954     Quit date: 3/14/2004     Smokeless tobacco: Never Used     Alcohol use No      Comment: rarely,socially/1/2 months     Family  History   Problem Relation Age of Onset     DIABETES Father          BP Readings from Last 3 Encounters:   17 162/78   17 150/76   17 155/69    Wt Readings from Last 3 Encounters:   17 159 lb (72.1 kg)   17 159 lb (72.1 kg)   10/05/16 153 lb (69.4 kg)                    Reviewed and updated as needed this visit by clinical staff  Tobacco  Allergies  Meds  Med Hx  Surg Hx  Fam Hx  Soc Hx      Reviewed and updated as needed this visit by Provider         ROS:  Constitutional, HEENT, cardiovascular, pulmonary, gi and gu systems are negative, except as otherwise noted.    OBJECTIVE:                                                    /78  Pulse 59  Temp 98.2  F (36.8  C) (Oral)  Wt 159 lb (72.1 kg)  SpO2 97%  BMI 27.29 kg/m2  Body mass index is 27.29 kg/(m^2).  GENERAL: healthy, alert and no distress  NECK: no adenopathy, no asymmetry, masses, or scars and thyroid normal to palpation  RESP: lungs clear to auscultation - no rales, rhonchi or wheezes  CV: regular rate and rhythm, normal S1 S2, no S3 or S4, no murmur, click or rub, no peripheral edema and peripheral pulses strong  ABDOMEN: soft, nontender, no hepatosplenomegaly, no masses and bowel sounds normal  MS: no gross musculoskeletal defects noted, no edema    Results for orders placed or performed in visit on 17   DEXA HIP/PELVIS/SPINE - Future    Narrative    BONE DENSITOMETRY  [address] [address]  2017      PATIENT: Negin Magallon  CHART: [id number]   : 1940  AGE: 77 year old  SEX: female   REFERRING PROVIDER: Ida Reveles MD     PROCEDURE: Bone density scanning was performed using DXA technology of the   lumbar spine and hip. Scanning was performed on a Lunar Prodigy scanner.   Reporting is completed in the form of a T-score. The T-score represents   the standard deviation from peak bone mass based on a young healthy adult.     REFERENCE  "T-SCORES:   Normal -1.0 and greater   Osteopenia Between -1.0 and -2.5   Osteoporosis -2.5 and less       RISK FACTORS: Postmenopausal    CURRENT TREATMENT: none listed     FINDINGS:  Lumbar Spine (L1-L4) T-score: -1.7 degenerative changes present  Left Femoral Neck   T-score: -2.7  Right Femoral Neck   T-score: -2.5      Lumbar (L1-L4) BMD: 0.973   Total Hip Mean BMD: 0.716     IMPRESSION  Osteoporosis., Degenerative changes of the lumbar spine which may falsely   elevate results.    Patient had a study performed previously, however the scans are not   available to compare to the current study.     Recommendations include ensuring adequate Calcium and Vitamin D.    The current NOF Guidelines recommend treatment for patients with prior hip   or vertebral fracture, T-score -2.5 or below, or 10 year risk of any major   osteoporotic fracture >20% or 10 year risk of hip fracture >3%, as   calculated using the FRAX calculator (www.shef.ac.uk/FRAX or you can   google \"FRAX\").      Based on these guidelines, treatment (in addition to calcium and vitamin   D) is recommended for this patient, after ruling out other causes of   osteoporosis.  This is meant as an aid to clinical decision making; one must still use   clinical judgement.      Follow up can be considered in 2 years.   ___________________  Heidi Brasher M.D.  Electronically signed             ASSESSMENT/PLAN:                                                        ICD-10-CM    1. Hypertension goal BP (blood pressure) < 140/90 I10    2. Osteoporosis M81.0 alendronate (FOSAMAX) 70 MG tablet     Her her BP readings at home are at goal, continue with current medications for BP management.     Fosamax for osteoporosis, continue calcium and vitamin D.     Ida Reveles MD  [facility]  "

## 2017-06-13 NOTE — NURSING NOTE
"Chief Complaint   Patient presents with     Hypertension     and refills,Dexa discussion        Initial /78  Pulse 59  Temp 98.2  F (36.8  C) (Oral)  Wt 159 lb (72.1 kg)  SpO2 97%  BMI 27.29 kg/m2 Estimated body mass index is 27.29 kg/(m^2) as calculated from the following:    Height as of 3/8/17: 5' 4\" (1.626 m).    Weight as of this encounter: 159 lb (72.1 kg).  Medication Reconciliation: complete  Yuridia Kim MA    "

## 2017-06-14 DIAGNOSIS — I10 HYPERTENSION GOAL BP (BLOOD PRESSURE) < 140/90: ICD-10-CM

## 2017-06-14 NOTE — TELEPHONE ENCOUNTER
losartan (COZAAR) 25 MG tablet      Last Written Prescription Date: 3/30/17  Last Fill Quantity: 90, # refills: 0  Last Office Visit with [facility] or [facility] prescribing provider: 6/12/17       Potassium   Date Value Ref Range Status   03/08/2017 4.9 3.4 - 5.3 mmol/L Final     Creatinine   Date Value Ref Range Status   03/08/2017 0.96 0.52 - 1.04 mg/dL Final     BP Readings from Last 3 Encounters:   06/12/17 162/78   03/28/17 150/76   03/08/17 155/69

## 2017-06-16 RX ORDER — LOSARTAN POTASSIUM 25 MG/1
TABLET ORAL
Qty: 90 TABLET | Refills: 1 | Status: SHIPPED | OUTPATIENT
Start: 2017-06-16 | End: 2017-11-01

## 2017-06-19 ENCOUNTER — OFFICE VISIT (OUTPATIENT)
Dept: AUDIOLOGY | Facility: CLINIC | Age: 77
End: 2017-06-19

## 2017-06-19 DIAGNOSIS — H90.3 SENSORY HEARING LOSS, BILATERAL: Primary | ICD-10-CM

## 2017-06-19 NOTE — PROGRESS NOTES
"AUDIOLOGY REPORT    BACKGROUND INFORMATION: Dr. Luis Dillon implanted Negin Magallon with a right  Nucleus  cochlear implant 08/2/2010 but had device failure, was explanted and reimplanted on 06/05/2011.  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 The patient was last seen for programming advice with investigation by Melissa De Jesus from [employer] on 07/02/2012.  She is being seen 6/19/2017 in Audiology at the [facility] [place] for follow up programming with 910 processor.   She was seen 10/17/2016 for upgrading processors.     PATIENT REPORT: She was remapped 6/5/2017 and although she reported she heard better at that time, when she went home, she felt it was not clear for both the 910 and 810 processors. She indicated that it no longer \"shuts down\" in noise, which had been a problem over the past 6 months.  She did not understand the function of SCAN, but when it was explained, she said she did not like it.  She heard a hissing noise with disabled, but not there with it enabled noise circuit.  She also never noted a problem with wind and wanted the wind circuit disabled.      PROBLEM LISTS AND TRIALS:  1.  First implant:  After a few months-November 2010, she noted a loud sound and tinnitus with and without the implant, and she felt she had problems with perception and consistency of sound since then. She had frequent complaints that clanging plates sounded uncomfortable and intermittency of sound. She had a device failure documented in May 2011.  2.  Second implant:  She was initially programmed 6/14/2011.    7/5/11- One week later, she noted interruptions of sound.  Normal electrode impedances.  8/2/11 " "Notes intermittency and can no longer hear birds.  Sera De Jesus from [employer] came for investigation and assistance in programming.  Low frequency electrode impedances are reducing.  8/15/11 Seems to hear better after programming.  Electrodes 15-22 impedances are low.  9/13/11 Notes a humming sound, decreased performance, and intermittency.  Good speech perception results for 3 months review  1/31/12 Notes intermittency and dizziness  2/20/12 Notes a humming sound, intermittency, and feels her benefit has diminished.  She also notes a numbness in her head.  ENT evaluation was recommended.  Improving speech perception results for 6 month review  5/21/12 Feels the implant is too loud, hissing sound, and not clear.  Reprogrammed and she left liking the sound.  Trials with frequency tables, with no consistent perception  6/5/12 She dislikes the new program, and notes a screeching sound and continued intermittency.  Electrode 1-3 disabled.  She notes a feeling in her neck for electrode 1 when doing NRT  7/2/2012 Programming seemed to resolve much of the problems  2/13/2017 She continues to complain that speech is not clear enough, and the instruments shut down in noise.  The noise and wind circuit were disabled.    Previously tried:  Turning off electrodes 15-22, but she hated the sound.  Enabling electrodes 15-22 and reprogramming and trials with frequency tables.  She preferred table 21.  6/5/2017 As she continues to complain that it shuts down, the Autosensitivity (ACS) was changed to Less 60 as the default was 55.  6/19/2017 It does not \"shut down\" but speech is no longer clear.  She liked her previous mapping better.    FITTING SESSION: Dr. Luis Dillon, cochlear implant surgeon, ordered today's appointment. The patient came to the clinic for adjustment to the programs in the external speech processor and for assessment of the external components of the cochlear implant system. These components provide power " and data to the internal device. Sound is only heard once the external portion is activated. Postoperative treatment, including device fitting and adjustment, audiologic assessments, and training are required at regular intervals.        Programming included psychophysical measures of comfort, threshold, and loudness balance, and adjustments of frequency tables and electrodes.  Processor type: 910      Headpiece type: Standard   Magnet strength: 2    TEST RESULTS:    Electrode Impedances: stable  Neural Response Testing: DNT  Facial Stimulation: Absent  Tinnitus: Present--Present  Balance Problems: Occasional from the Menieres disease, no longer taking medications for it  Pain/Discomfort: Occasional at   Strategies Tried:   Patient listening to the 3 previous maps and preferred her earlier map.  However, the ASC continued to be at Less 60 in the first two maps and 55 the others for comparison. Wind was disabled and Noise enabled as for the latter she noted a extra background noise with Noise disabled.  Programs 910 Processor:   1. 38 HOME (ASC less 60)  2. 38  CAFE adaptive directional; (ASC less 60)  3. 38 HOME; ASC 55   4. 38 SCAN adaptive directional ASC 55  Number of Channels per Program: 2-22 enabled.    The previous program 40 was installed in the 810 processor with similar adjustments to previously used   1. 40 new map, with standard FAT 21 ([phone]); everyday (adro with autosensitivity)  2. 40new map, Noise adaptive directional  3. 40 newwith standard FAT 21 ([phone]); everyday (adro with autosensitivity)   4. 40 new Noise, adaptive directional      SUMMARY AND RECOMMENDATIONS: Ms Magallon was seen for follow-up programming with her new 910 processor. Her previous map with was installed, and she reports it is better.  She was given instructions on the differences between maps/   Ideally changing ASC to Less 60 will continue to diminish the problem in cutting out the noise, but she has programs to  try if it does not help.  She should return in 1 year or sooner if problems.  Questions were answered.    Betzy Sevilla, CCC-A  Licensed Audiologist  [place] #[id number]

## 2017-06-19 NOTE — MR AVS SNAPSHOT
After Visit Summary   6/19/2017    Negin Magallon    MRN: [id number]           Patient Information     Date Of Birth          1940        Visit Information        Provider Department      6/19/2017 11:00 AM Annette Colbert [facility] Audiology        Today's Diagnoses     Sensory hearing loss, bilateral    -  1       Follow-ups after your visit        Your next 10 appointments already scheduled     Jun 06, 2018 10:30 AM CDT   Cochlear Implant Brief with Amee Wynne [facility] Audiology ([facility])    [address] [place] [id number] [phone]              Who to contact     Please call your clinic at [phone] to:    Ask questions about your health    Make or cancel appointments    Discuss your medicines    Learn about your test results    Speak to your doctor   If you have compliments or concerns about an experience at your clinic, or if you wish to file a complaint, please contact [facility] Patient Relations at [phone] or email us at [email or web address]         Additional Information About Your Visit        MyChart Information     [employer] gives you secure access to your electronic health record. If you see a primary care provider, you can also send messages to your care team and make appointments. If you have questions, please call your primary care clinic.  If you do not have a primary care provider, please call [phone] and they will assist you.      [employer] is an electronic gateway that provides easy, online access to your medical records. With [employer], you can request a clinic appointment, read your test results, renew a prescription or communicate with your care team.     To access your existing account, please contact your [facility] Clinic or call [phone] for assistance.        Care EveryWhere ID     This is your Care EveryWhere  ID. This could be used by other organizations to access your [place] medical records  [id number]         Blood Pressure from Last 3 Encounters:   06/12/17 162/78   03/28/17 150/76   03/08/17 155/69    Weight from Last 3 Encounters:   06/12/17 72.1 kg (159 lb)   03/08/17 72.1 kg (159 lb)   10/05/16 69.4 kg (153 lb)              We Performed the Following     RT: Diagnostic Analysis of CI 7 yrs & over, Subsequent Programming   ([id number])        Primary Care Provider Office Phone # Fax #    Ida Bryan Reveles [phone]       [facility] [address] [facility] [address]        Thank you!     Thank you for choosing [facility] AUDIOLOGY  for your care. Our goal is always to provide you with excellent care. Hearing back from our patients is one way we can continue to improve our services. Please take a few minutes to complete the written survey that you may receive in the mail after your visit with us. Thank you!             Your Updated Medication List - Protect others around you: Learn how to safely use, store and throw away your medicines at www.disposemymeds.org.          This list is accurate as of: 6/19/17  2:07 PM.  Always use your most recent med list.                   Brand Name Dispense Instructions for use    alendronate 70 MG tablet    FOSAMAX    4 tablet    Take 1 tablet (70 mg) by mouth every 7 days Take 60 minutes before am meal with 8 oz. water. Remain upright for 30 minutes.       CALCIUM + D PO      petite 400/500 2 daily       levothyroxine 50 MCG tablet    SYNTHROID/LEVOTHROID    90 tablet    Take 1 tablet (50 mcg) by mouth daily       losartan 25 MG tablet    COZAAR    90 tablet    TAKE 1 TABLET(25 MG) BY MOUTH DAILY       meclizine 25 MG tablet    ANTIVERT    180 tablet    Take 1 tablet (25 mg) by mouth 2 times daily       metoprolol 25 MG tablet    LOPRESSOR    180 tablet    Take 1 tablet (25 mg) by mouth 2 times daily       STATIN NOT PRESCRIBED  (INTENTIONAL)      by Other route continuous prn.       TYLENOL 325 MG tablet   Generic drug:  acetaminophen      Take 1-2 tablets by mouth every 6 hours as needed. As needed

## 2017-08-16 ENCOUNTER — OFFICE VISIT (OUTPATIENT)
Dept: FAMILY MEDICINE | Facility: CLINIC | Age: 77
End: 2017-08-16
Payer: COMMERCIAL

## 2017-08-16 VITALS
DIASTOLIC BLOOD PRESSURE: 62 MMHG | HEART RATE: 54 BPM | SYSTOLIC BLOOD PRESSURE: 153 MMHG | WEIGHT: 159 LBS | BODY MASS INDEX: 27.29 KG/M2 | TEMPERATURE: 97.5 F

## 2017-08-16 DIAGNOSIS — Z01.818 PREOP GENERAL PHYSICAL EXAM: ICD-10-CM

## 2017-08-16 DIAGNOSIS — I10 HYPERTENSION GOAL BP (BLOOD PRESSURE) < 140/90: ICD-10-CM

## 2017-08-16 DIAGNOSIS — Z01.818 PRE-OPERATIVE GENERAL PHYSICAL EXAMINATION: Primary | ICD-10-CM

## 2017-08-16 LAB
ANION GAP SERPL CALCULATED.3IONS-SCNC: 9 MMOL/L (ref 3–14)
BUN SERPL-MCNC: 19 MG/DL (ref 7–30)
CALCIUM SERPL-MCNC: 9.7 MG/DL (ref 8.5–10.1)
CHLORIDE SERPL-SCNC: 94 MMOL/L (ref 94–109)
CO2 SERPL-SCNC: 28 MMOL/L (ref 20–32)
CREAT SERPL-MCNC: 0.97 MG/DL (ref 0.52–1.04)
GFR SERPL CREATININE-BSD FRML MDRD: 56 ML/MIN/1.7M2
GLUCOSE SERPL-MCNC: 111 MG/DL (ref 70–99)
HGB BLD-MCNC: 12.9 G/DL (ref 11.7–15.7)
POTASSIUM SERPL-SCNC: 5.2 MMOL/L (ref 3.4–5.3)
SODIUM SERPL-SCNC: 131 MMOL/L (ref 133–144)

## 2017-08-16 PROCEDURE — 36415 COLL VENOUS BLD VENIPUNCTURE: CPT | Performed by: FAMILY MEDICINE

## 2017-08-16 PROCEDURE — 99215 OFFICE O/P EST HI 40 MIN: CPT | Performed by: FAMILY MEDICINE

## 2017-08-16 PROCEDURE — 80048 BASIC METABOLIC PNL TOTAL CA: CPT | Performed by: FAMILY MEDICINE

## 2017-08-16 PROCEDURE — 85018 HEMOGLOBIN: CPT | Performed by: FAMILY MEDICINE

## 2017-08-16 PROCEDURE — 93000 ELECTROCARDIOGRAM COMPLETE: CPT | Performed by: FAMILY MEDICINE

## 2017-08-16 NOTE — NURSING NOTE
"Chief Complaint   Patient presents with     Pre-Op Exam       Initial /66  Pulse 56  Temp 97.5  F (36.4  C) (Oral)  Wt 159 lb (72.1 kg)  BMI 27.29 kg/m2 Estimated body mass index is 27.29 kg/(m^2) as calculated from the following:    Height as of 3/8/17: 5' 4\" (1.626 m).    Weight as of this encounter: 159 lb (72.1 kg).  Medication Reconciliation: complete  Yuridia Kim MA    "

## 2017-08-16 NOTE — PROGRESS NOTES
Results discussed with patient during the clinic visit.     .Ida Reveles MD.   Family Physician.  [facility].

## 2017-08-16 NOTE — PROGRESS NOTES
"  SUBJECTIVE:                                                    Negin Magallon is a 77 year old female who presents to clinic today for the following health issues:  {Provider please address medication reconciliation discrepancies--rooming staff please delete if no med/rec issues}    {Superlists:646872}    {additional problems for provider to add:838412}    Problem list and histories reviewed & adjusted, as indicated.  Additional history: {NONE - AS DOCUMENTED:746747::\"as documented\"}    {HIST REVIEW/ LINKS 2:348718}    Reviewed and updated as needed this visit by clinical staff       Reviewed and updated as needed this visit by Provider         {PROVIDER CHARTING PREFERENCE:751007}  "

## 2017-08-16 NOTE — MR AVS SNAPSHOT
After Visit Summary   8/16/2017    Negin Magallon    MRN: [id number]           Patient Information     Date Of Birth          1940        Visit Information        Provider Department      8/16/2017 11:40 AM Ida Reveles MD [facility]        Today's Diagnoses     Pre-operative general physical examination    -  1    Hypertension goal BP (blood pressure) < 140/90        Preop general physical exam          Care Instructions      Before Your Surgery      Call your surgeon if there is any change in your health. This includes signs of a cold or flu (such as a sore throat, runny nose, cough, rash or fever).    Do not smoke, drink alcohol or take over the counter medicine (unless your surgeon or primary care doctor tells you to) for the 24 hours before and after surgery.    If you take prescribed drugs: Follow your doctor s orders about which medicines to take and which to stop until after surgery.    Eating and drinking prior to surgery: follow the instructions from your surgeon    Take a shower or bath the night before surgery. Use the soap your surgeon gave you to gently clean your skin. If you do not have soap from your surgeon, use your regular soap. Do not shave or scrub the surgery site.  Wear clean pajamas and have clean sheets on your bed.           Follow-ups after your visit        Your next 10 appointments already scheduled     Aug 28, 2017  8:30 AM CDT   Cochlear Implant Brief with Amee Wynne [facility])    [address] [place] [id number] [phone]            Jun 06, 2018 10:30 AM CDT   Cochlear Implant Brief with Amee Wynne [facility] ([facility])    [address] [place] [id number]   [phone]              Who to contact     If you have questions or need follow up information about  today's clinic visit or your schedule please contact [facility] directly at [phone].  Normal or non-critical lab and imaging results will be communicated to you by MyChart, letter or phone within 4 business days after the clinic has received the results. If you do not hear from us within 7 days, please contact the clinic through [employer] or phone. If you have a critical or abnormal lab result, we will notify you by phone as soon as possible.  Submit refill requests through [employer] or call your pharmacy and they will forward the refill request to us. Please allow 3 business days for your refill to be completed.          Additional Information About Your Visit        [employer] Information     [employer] gives you secure access to your electronic health record. If you see a primary care provider, you can also send messages to your care team and make appointments. If you have questions, please call your primary care clinic.  If you do not have a primary care provider, please call [phone] and they will assist you.        Care EveryWhere ID     This is your Care EveryWhere ID. This could be used by other organizations to access your [place] medical records  [id number]        Your Vitals Were     Pulse Temperature BMI (Body Mass Index)             56 97.5  F (36.4  C) (Oral) 27.29 kg/m2          Blood Pressure from Last 3 Encounters:   08/16/17 162/66   06/12/17 162/78   03/28/17 150/76    Weight from Last 3 Encounters:   08/16/17 159 lb (72.1 kg)   06/12/17 159 lb (72.1 kg)   03/08/17 159 lb (72.1 kg)              We Performed the Following     Basic metabolic panel  (Ca, Cl, CO2, Creat, Gluc, K, Na, BUN)     EKG 12-lead complete w/read - Clinics     Hemoglobin        Primary Care Provider Office Phone # Fax #    Ida Bryan Reveles [phone]       [address] [facility] [address]        Equal Access to Services     CIARRA HUNTER : Rebecca kerr  Somary, waaxda luqadaha, qaybta kaalsalinas quezada, saw kasperflavio katerina. So [facility] [phone].    ATENCIÓN: Si jolanta whitehead, tiene a john disposición servicios gratuitos de asistencia lingüística. Yordy al [phone].    We comply with applicable federal civil rights laws and [place] laws. We do not discriminate on the basis of race, color, national origin, age, disability sex, sexual orientation or gender identity.            Thank you!     Thank you for choosing [facility]  for your care. Our goal is always to provide you with excellent care. Hearing back from our patients is one way we can continue to improve our services. Please take a few minutes to complete the written survey that you may receive in the mail after your visit with us. Thank you!             Your Updated Medication List - Protect others around you: Learn how to safely use, store and throw away your medicines at www.disposemymeds.org.          This list is accurate as of: 8/16/17  1:13 PM.  Always use your most recent med list.                   Brand Name Dispense Instructions for use Diagnosis    alendronate 70 MG tablet    FOSAMAX    4 tablet    Take 1 tablet (70 mg) by mouth every 7 days Take 60 minutes before am meal with 8 oz. water. Remain upright for 30 minutes.    Osteoporosis       CALCIUM + D PO      petite 400/500 2 daily        levothyroxine 50 MCG tablet    SYNTHROID/LEVOTHROID    90 tablet    Take 1 tablet (50 mcg) by mouth daily    Hypothyroidism, unspecified type       losartan 25 MG tablet    COZAAR    90 tablet    TAKE 1 TABLET(25 MG) BY MOUTH DAILY    Hypertension goal BP (blood pressure) < 140/90       meclizine 25 MG tablet    ANTIVERT    180 tablet    Take 1 tablet (25 mg) by mouth 2 times daily    Meniere's disease, unspecified laterality       metoprolol 25 MG tablet    LOPRESSOR    180 tablet    Take 1 tablet (25 mg) by mouth 2 times daily    Essential hypertension with  goal blood pressure less than 140/90       PERCOCET PO           STATIN NOT PRESCRIBED (INTENTIONAL)      by Other route continuous prn.    Hyperlipidemia LDL goal <130       TYLENOL 325 MG tablet   Generic drug:  acetaminophen      Take 1-2 tablets by mouth every 6 hours as needed. As needed

## 2017-08-16 NOTE — PROGRESS NOTES
[address] [id number] [phone]  Dept: [phone]    PRE-OP EVALUATION:  Today's date: 2017    Negin Magallon (: 1940) presents for pre-operative evaluation assessment as requested by  .  She requires evaluation and anesthesia risk assessment prior to undergoing surgery/procedure for treatment of repair cracked bones in L  wrist .  Proposed procedure: repair cracked bones in wrist     Date of Surgery/ Procedure: 17   Time of Surgery/ Procedure: unknown   Hospital/Surgical Facility: [facility]   [phone]  Primary Physician: Ida Reveles  Type of Anesthesia Anticipated: to be determined    Patient has a Health Care Directive or Living Will:  NO    Preop Questions 2017   1.  Do you have a history of heart attack, stroke, stent, bypass or surgery on an artery in the head, neck, heart or legs? No   2.  Do you ever have any pain or discomfort in your chest? No   3.  Do you have a history of  Heart Failure? No   4.   Are you troubled by shortness of breath when:  walking on a level surface, or up a slight hill, or at night? No   5.  Do you currently have a cold, bronchitis or other respiratory infection? No   6.  Do you have a cough, shortness of breath, or wheezing? No   7.  Do you sometimes get pains in the calves of your legs when you walk? No   8. Do you or anyone in your family have previous history of blood clots? No   9.  Do you or does anyone in your family have a serious bleeding problem such as prolonged bleeding following surgeries or cuts? No   10. Have you ever had problems with anemia or been told to take iron pills? No   11. Have you had any abnormal blood loss such as black, tarry or bloody stools, or abnormal vaginal bleeding? No   12. Have you ever had a blood transfusion? YES - many yrs ago.    13. Have you or any of your relatives ever had problems  with anesthesia? No   14. Do you have sleep apnea, excessive snoring or daytime drowsiness? No   15. Do you have any prosthetic heart valves? No   16. Do you have prosthetic joints? No   17. Is there any chance that you may be pregnant? No           HPI:                                                      Brief HPI related to upcoming procedure: fell down and  her left forearm bones. She is scheduled for open reduction and internal fixation. Same day surgery.     See problem list for active medical problems.  Problems all longstanding and stable, except as noted/documented.  See ROS for pertinent symptoms related to these conditions.                                                                                                  .    MEDICAL HISTORY:                                                    Patient Active Problem List    Diagnosis Date Noted     Shoulder impingement 10/14/2014     Priority: Medium     Adhesive capsulitis 10/14/2014     Priority: Medium     Gout 02/28/2013     Priority: Medium     DJD (degenerative joint disease) 09/18/2012     Priority: Medium     Hypertension goal BP (blood pressure) < 140/90 05/07/2012     Priority: Medium     Hypothyroidism 03/10/2011     Priority: Medium     Meniere's disease 03/10/2011     Priority: Medium     Cochlear implant in place 03/10/2011     Priority: Medium     Hyperlipidemia LDL goal <130 03/10/2011     Priority: Medium     Reluctant to take any meds       Advanced directives, counseling/discussion 03/10/2011     Priority: Medium     Advance Care Planning:   Receipt of ACP document:  Received: Health Care Directive which was witnessed or notarized on 08/31/1993.  Document previously scanned on 11/18/2009.  Validation form completed and sent to be scanned.  Code Status reflects choices in most recent ACP document.  Confirmed/documented designated decision maker(s). See permanent comments section of demographics in clinical tab. View document(s) and  details by clicking on code status.   Added by Jaelyn Fairbanks on 9/30/2013.      Completed  Rafaela Root CMA              Past Medical History:   Diagnosis Date     Arthritis      Hypertension      Meniere's disease      Thyroid disease     hypothyroidism     Past Surgical History:   Procedure Laterality Date     ABDOMEN SURGERY       COLONOSCOPY       ENT SURGERY  9/2010     R cochlear implant     EYE SURGERY      L eye muscle     GI SURGERY       HEMORRHOID SURGERY       HYSTERECTOMY, CERVIX STATUS UNKNOWN       IMPLANT REVISION COCHLEA  5/26/2011    Procedure:IMPLANT REVISION COCHLEA;  Remove and replace nucleus cochlear implant right.; Surgeon:BRITTANEY STAUFFER; Location:[facility] OR     Current Outpatient Prescriptions   Medication Sig Dispense Refill     losartan (COZAAR) 25 MG tablet TAKE 1 TABLET(25 MG) BY MOUTH DAILY 90 tablet 1     alendronate (FOSAMAX) 70 MG tablet Take 1 tablet (70 mg) by mouth every 7 days Take 60 minutes before am meal with 8 oz. water. Remain upright for 30 minutes. 4 tablet 3     levothyroxine (SYNTHROID/LEVOTHROID) 50 MCG tablet Take 1 tablet (50 mcg) by mouth daily 90 tablet 3     metoprolol (LOPRESSOR) 25 MG tablet Take 1 tablet (25 mg) by mouth 2 times daily 180 tablet 3     meclizine (ANTIVERT) 25 MG tablet Take 1 tablet (25 mg) by mouth 2 times daily 180 tablet 1     STATIN NOT PRESCRIBED, INTENTIONAL, by Other route continuous prn.  0     acetaminophen (TYLENOL) 325 MG tablet Take 1-2 tablets by mouth every 6 hours as needed. As needed       CALCIUM + D OR petite 400/500 2 daily       OTC products: None, except as noted above    Allergies   Allergen Reactions     Amoxicillin Swelling     tongue     Atorvastatin Calcium      Muscle aches.  Has also tried muvacor, provachol, lopid, and crestor with same results     Lisinopril Cough     Prevacid [Aspartame] Diarrhea     Advil [Ibuprofen] Rash     Prednisone Swelling and Rash     Medrol dose pack      Latex Allergy: NO    Social History    Substance Use Topics     Smoking status: Former Smoker     Packs/day: 1.00     Years: 50.00     Types: Cigarettes     Start date: 3/14/1954     Quit date: 3/14/2004     Smokeless tobacco: Never Used     Alcohol use No      Comment: rarely,socially/1/2 months     History   Drug Use No       REVIEW OF SYSTEMS:                                                    Constitutional, neuro, ENT, endocrine, pulmonary, cardiac, gastrointestinal, genitourinary, musculoskeletal, integument and psychiatric systems are negative, except as otherwise noted.    EXAM:                                                    /62  Pulse 54  Temp 97.5  F (36.4  C) (Oral)  Wt 159 lb (72.1 kg)  BMI 27.29 kg/m2    GENERAL APPEARANCE: healthy, alert and no distress     EYES: EOMI, PERRL     HENT: ear canals and TM's normal and nose and mouth without ulcers or lesions     NECK: no adenopathy, no asymmetry, masses, or scars and thyroid normal to palpation     RESP: lungs clear to auscultation - no rales, rhonchi or wheezes     CV: regular rates and rhythm, normal S1 S2, no S3 or S4 and no murmur, click or rub     ABDOMEN:  soft, nontender, no HSM or masses and bowel sounds normal     MS: extremities normal- no gross deformities noted, no evidence of inflammation in joints, FROM in all extremities.     SKIN: no suspicious lesions or rashes     NEURO: Normal strength and tone, sensory exam grossly normal, mentation intact and speech normal    DIAGNOSTICS:                                                    EKG: compared with previous EKG in 2013. No new changes. Pt denies CP, denies SOB, denies exertional symptoms.     Recent Labs   Lab Test  03/08/17   1303  04/15/16   0916  03/18/16   1223  03/16/15   1613   10/19/12   0837   05/14/12   0904   HGB   --    --   13.4  13.6   --    --    --    --    PLT   --    --   225  227   --    --    --    --    NA  138  140  137  141   < >   --    < >  140   POTASSIUM  4.9  4.4  4.2  4.1   < >   --    <  >  4.3   CR  0.96  0.98  1.08*  0.78   < >   --    < >  0.97   A1C   --    --    --    --    --   5.7   --   6.0    < > = values in this interval not displayed.      Results for orders placed or performed in visit on 08/16/17   Hemoglobin   Result Value Ref Range    Hemoglobin 12.9 11.7 - 15.7 g/dL       IMPRESSION:                                                    Reason for surgery/procedure: fractures left forearm bones.   Diagnosis/reason for consult: pre op clearance for open reduction and internal fixation.     The proposed surgical procedure is considered INTERMEDIATE risk.    REVISED CARDIAC RISK INDEX  The patient has the following serious cardiovascular risks for perioperative complications such as (MI, PE, VFib and 3  AV Block):  No serious cardiac risks  INTERPRETATION: 0 risks: Class I (very low risk - 0.4% complication rate)    The patient has the following additional risks for perioperative complications:  No identified additional risks      ICD-10-CM    1. Pre-operative general physical examination Z01.818 EKG 12-lead complete w/read - Clinics     Hemoglobin     Basic metabolic panel  (Ca, Cl, CO2, Creat, Gluc, K, Na, BUN)   2. Hypertension goal BP (blood pressure) < 140/90 I10 Basic metabolic panel  (Ca, Cl, CO2, Creat, Gluc, K, Na, BUN)   3. Preop general physical exam Z01.818        RECOMMENDATIONS:                                                        --Patient is to take all scheduled medications on the day of surgery.     APPROVAL GIVEN to proceed with proposed procedure, without further diagnostic evaluation.     Awaiting BMP result.    BP : systolic higher then 140. Proceed with the scheduled surgery. Pt to f/u in the clinic for BP f/u later.     Signed Electronically by: Ida Reveles MD    Copy of this evaluation report is provided to requesting physician.    [facility] Preop Guidelines

## 2017-08-16 NOTE — PROGRESS NOTES
Results discussed with patient during the clinic visit.     .Ida Reveles MD.   Family Physician.  [facility].

## 2017-08-17 NOTE — PROGRESS NOTES
Negin,     Your recent labs are attached.   Your sodium level is little low otherwise labs look good.   Adding little bit of salt in food should help.     Let me know if you have any questions.     Ida Reveles MD.   Family Physician.  [facility].

## 2017-08-21 ENCOUNTER — MYC MEDICAL ADVICE (OUTPATIENT)
Dept: FAMILY MEDICINE | Facility: CLINIC | Age: 77
End: 2017-08-21

## 2017-08-21 DIAGNOSIS — S52.92XD LEFT FOREARM FRACTURE, CLOSED, WITH ROUTINE HEALING, SUBSEQUENT ENCOUNTER: Primary | ICD-10-CM

## 2017-08-21 NOTE — TELEPHONE ENCOUNTER
Please see MyChart message below.    Routed to PCP for Home Care referral.      Opal Van RN  [facility]

## 2017-08-24 ENCOUNTER — DOCUMENTATION ONLY (OUTPATIENT)
Dept: CARE COORDINATION | Facility: CLINIC | Age: 77
End: 2017-08-24

## 2017-08-24 NOTE — PROGRESS NOTES
[facility] now requests orders and shares plan of care/discharge summaries for some patients through [employer].  Please REPLY TO THIS MESSAGE in order to give authorization for orders when needed.  This is considered a verbal order, you will still receive a faxed copy of orders for signature.  Thank you for your assistance in improving collaboration for our patients.    ORDERS: SN 1xwx1, 2xwx4, 1xwx2 3 PRN, PT/OT/SW evals and HHA 2xwx5.    Thank you,  Riri Vazquez, [facility] RN  [phone]

## 2017-08-24 NOTE — PROGRESS NOTES
Abeba for the homecare orders below.    Ida Reveles MD.   Family Physician.  [facility].

## 2017-08-25 ENCOUNTER — DOCUMENTATION ONLY (OUTPATIENT)
Dept: CARE COORDINATION | Facility: CLINIC | Age: 77
End: 2017-08-25

## 2017-08-25 ENCOUNTER — TELEPHONE (OUTPATIENT)
Dept: FAMILY MEDICINE | Facility: CLINIC | Age: 77
End: 2017-08-25

## 2017-08-25 DIAGNOSIS — K59.03 DRUG-INDUCED CONSTIPATION: Primary | ICD-10-CM

## 2017-08-25 RX ORDER — AMOXICILLIN 250 MG
1 CAPSULE ORAL 2 TIMES DAILY
Qty: 60 TABLET | Refills: 0 | Status: SHIPPED | OUTPATIENT
Start: 2017-08-25 | End: 2017-11-01

## 2017-08-25 NOTE — PROGRESS NOTES
[facility] now requests orders and shares plan of care/discharge summaries for some patients through [employer].  Please REPLY TO THIS MESSAGE in order to give authorization for orders when needed.  This is considered a verbal order, you will still receive a faxed copy of orders for signature.  Thank you for your assistance in improving collaboration for our patients.    ORDER ok for OT to cont 3wk3 for ADL/IADL, HEP and DME needs.    Elva SUTTON/L  [phone]  [email or web address]

## 2017-08-25 NOTE — TELEPHONE ENCOUNTER
If pt is on opioids for her colleen control, constipation is the common SE.   I have sent Senna- docusate prescription to her pharmacy.     Ida Reveles MD.   Family Physician.  [facility].

## 2017-08-25 NOTE — TELEPHONE ENCOUNTER
Reason for call:  Patient reporting a symptom    Symptom or request: Constipation     Duration (how long have symptoms been present): About 5 days     Have you been treated for this before? No    Additional comments: Patient home care nurse called to discuss what can be done to help with the patient.    Phone Number patient can be reached at:  Other phone number:  [phone]    Best Time:  Anytime    Can we leave a detailed message on this number:  YES    Call taken on 8/25/2017 at 1:21 PM by Karlie Moe

## 2017-08-25 NOTE — TELEPHONE ENCOUNTER
Called and spoke with Carmenza.  RN advised Metamucil, Miralax or other OTC laxatives.  He states she has Senna, but hasn't used it because it is .  Carmenza did advise her to move around, drink more water, and try Senna.    Looking for something stronger for patient.  Carmenza would want clinic to call patient back with recommendations.    Routed to PCP - would you want patient to be on a stronger laxative or re-send Senna?    Opal Van RN  [facility]

## 2017-08-25 NOTE — TELEPHONE ENCOUNTER
I called Carmenza from home care and advised him of provider response and Rx sent.    I called patient at home; spoke to adult male who is with patient; patient has a cochlear implant and would rather not have to get out of her recliner.  He relayed message to patient (via closed caption phone) regarding the medication and advice on increasing fiber and liquids.   He states he already bought over the counter senna-docusate, same dose but might go back to pharmacy to see if they can apply insurance to it and refund him.    Advised they call if not improving.    Lisa Cisse RN  [facility]

## 2017-08-26 ENCOUNTER — DOCUMENTATION ONLY (OUTPATIENT)
Dept: CARE COORDINATION | Facility: CLINIC | Age: 77
End: 2017-08-26

## 2017-08-26 NOTE — PROGRESS NOTES
[facility] now requests orders and shares plan of care/discharge summaries for some patients through [employer].  Please REPLY TO THIS MESSAGE in order to give authorization for orders when needed.  This is considered a verbal order, you will still receive a faxed copy of orders for signature.  Thank you for your assistance in improving collaboration for our patients.    ORDER  PT Is requesting continued PT orders for patient  Gait and transfer training and exercises  2 week 6   Thank you.  Arabella Pugh  PT  [email or web address]  [phone]      MD SUMMARY/PLAN OF CARE    Patient is a 75 year old female who fractured her left wrist due to a fall, patient had ORIF surgery on 08/21/17.  Paitent also has tailbone pain from the fall.  Patient lives with supportive . Patient has tinetti score of 19 out of 28 without assist device.  Patient would benefit from further homecare PT visits for gait and transfer training and exercises. 2 week 4

## 2017-08-27 NOTE — PROGRESS NOTES
Agree and okay for the orders below.     Ida Reveles MD.   Family Physician.  [facility].

## 2017-09-01 ENCOUNTER — TELEPHONE (OUTPATIENT)
Dept: FAMILY MEDICINE | Facility: CLINIC | Age: 77
End: 2017-09-01

## 2017-09-01 NOTE — TELEPHONE ENCOUNTER
Forms received from: [facility]   Phone number listed: [phone]    Fax listed: [phone]  Date received: 09/01/2017  Form description: pt orders  Once forms are completed, please return to [facility] via fax.  Form placed: in providers folder  Essie Pugh

## 2017-09-06 ENCOUNTER — TELEPHONE (OUTPATIENT)
Dept: FAMILY MEDICINE | Facility: CLINIC | Age: 77
End: 2017-09-06

## 2017-09-06 DIAGNOSIS — S52.92XD LEFT FOREARM FRACTURE, CLOSED, WITH ROUTINE HEALING, SUBSEQUENT ENCOUNTER: Primary | ICD-10-CM

## 2017-09-06 PROCEDURE — G0180 MD CERTIFICATION HHA PATIENT: HCPCS | Performed by: FAMILY MEDICINE

## 2017-09-06 NOTE — TELEPHONE ENCOUNTER
Called and spoke with Sharon, informed of VO as below.  She verbalized understanding.    Opal Van RN  [facility]

## 2017-09-06 NOTE — TELEPHONE ENCOUNTER
Reason for Call: Request for an order or referral:    Order or referral being requested: Sharon with [facility] [facility] calling for an additional  visit to complete [facility] forms.    Date needed: as soon as possible    Has the patient been seen by the PCP for this problem? Not Applicable    Additional comments:     Phone number Patient can be reached at:  Other phone number:  [phone]    Best Time:  any    Can we leave a detailed message on this number?  YES    Call taken on 9/6/2017 at 10:09 AM by Neha Morales

## 2017-09-06 NOTE — TELEPHONE ENCOUNTER
Forms signed, in team 2 TC's basket. Order signed.    Ida Reveles MD.   Family Physician.  [facility].

## 2017-09-06 NOTE — TELEPHONE ENCOUNTER
[facility] medication list reconciled.  Shira Polanco RN [facility] Triage.

## 2017-09-06 NOTE — TELEPHONE ENCOUNTER
Forms received from: [facility]   Phone number listed: [phone]   Fax listed: [phone]  Date received: 09/06/2017  Form description: HHC and Plan of Care  Once forms are completed, please return to [facility] via fax.  Is patient requesting to be contacted when forms are completed: NA    Form placed: In provider's nurse basket  Neha Morales

## 2017-09-13 ENCOUNTER — TELEPHONE (OUTPATIENT)
Dept: FAMILY MEDICINE | Facility: CLINIC | Age: 77
End: 2017-09-13

## 2017-09-13 NOTE — TELEPHONE ENCOUNTER
Forms received from:  home care and hospice   Phone number listed: [phone]   Fax listed: [phone]  Date received: 09/13/2017  Form description: sw orders  Once forms are completed, please return to  home care and hospice via fax.  Form placed: in providers folder  Essie Pugh

## 2017-09-19 ENCOUNTER — TELEPHONE (OUTPATIENT)
Dept: FAMILY MEDICINE | Facility: CLINIC | Age: 77
End: 2017-09-19

## 2017-09-19 NOTE — TELEPHONE ENCOUNTER
Reason for Call:  FYI / update    Detailed comments: Patient was discharged from home care today and doing well.    Phone Number Patient can be reached at:   [facility] [phone]         Best Time: anytime    Can we leave a detailed message on this number? YES    Call taken on 9/19/2017 at 3:08 PM by Tamra Lawson

## 2017-09-23 ENCOUNTER — MYC MEDICAL ADVICE (OUTPATIENT)
Dept: FAMILY MEDICINE | Facility: CLINIC | Age: 77
End: 2017-09-23

## 2017-09-23 DIAGNOSIS — I10 ESSENTIAL HYPERTENSION WITH GOAL BLOOD PRESSURE LESS THAN 140/90: ICD-10-CM

## 2017-09-25 RX ORDER — METOPROLOL TARTRATE 25 MG/1
25 TABLET, FILM COATED ORAL 2 TIMES DAILY
Qty: 180 TABLET | Refills: 0 | Status: SHIPPED | OUTPATIENT
Start: 2017-09-25 | End: 2017-11-01

## 2017-09-25 NOTE — TELEPHONE ENCOUNTER
metoprolol      Last Written Prescription Date: 10/5/16   Last Fill Quantity: 180, # refills: 3    Last Office Visit with G, P or [facility] prescribing provider:  8/16/17   Future Office Visit:        BP Readings from Last 3 Encounters:   08/16/17 153/62   06/12/17 162/78   03/28/17 150/76     Routing refill request to provider for review/approval because:  BP over goal 140/90  Mary Francisco RN  [facility]

## 2017-11-01 ENCOUNTER — OFFICE VISIT (OUTPATIENT)
Dept: FAMILY MEDICINE | Facility: CLINIC | Age: 77
End: 2017-11-01
Payer: COMMERCIAL

## 2017-11-01 VITALS
BODY MASS INDEX: 27.46 KG/M2 | SYSTOLIC BLOOD PRESSURE: 157 MMHG | DIASTOLIC BLOOD PRESSURE: 74 MMHG | HEART RATE: 56 BPM | WEIGHT: 160 LBS | TEMPERATURE: 97.7 F

## 2017-11-01 DIAGNOSIS — M81.0 OSTEOPOROSIS, UNSPECIFIED OSTEOPOROSIS TYPE, UNSPECIFIED PATHOLOGICAL FRACTURE PRESENCE: ICD-10-CM

## 2017-11-01 DIAGNOSIS — I10 ESSENTIAL HYPERTENSION WITH GOAL BLOOD PRESSURE LESS THAN 140/90: ICD-10-CM

## 2017-11-01 DIAGNOSIS — Z00.00 ROUTINE GENERAL MEDICAL EXAMINATION AT A HEALTH CARE FACILITY: Primary | ICD-10-CM

## 2017-11-01 DIAGNOSIS — Z23 NEED FOR PROPHYLACTIC VACCINATION AND INOCULATION AGAINST INFLUENZA: ICD-10-CM

## 2017-11-01 DIAGNOSIS — I10 HYPERTENSION GOAL BP (BLOOD PRESSURE) < 140/90: ICD-10-CM

## 2017-11-01 LAB
ANION GAP SERPL CALCULATED.3IONS-SCNC: 6 MMOL/L (ref 3–14)
BUN SERPL-MCNC: 12 MG/DL (ref 7–30)
CALCIUM SERPL-MCNC: 9.4 MG/DL (ref 8.5–10.1)
CHLORIDE SERPL-SCNC: 102 MMOL/L (ref 94–109)
CO2 SERPL-SCNC: 28 MMOL/L (ref 20–32)
CREAT SERPL-MCNC: 0.86 MG/DL (ref 0.52–1.04)
CREAT UR-MCNC: 28 MG/DL
GFR SERPL CREATININE-BSD FRML MDRD: 64 ML/MIN/1.7M2
GLUCOSE SERPL-MCNC: 135 MG/DL (ref 70–99)
MICROALBUMIN UR-MCNC: 94 MG/L
MICROALBUMIN/CREAT UR: 333.1 MG/G CR (ref 0–25)
POTASSIUM SERPL-SCNC: 4.1 MMOL/L (ref 3.4–5.3)
SODIUM SERPL-SCNC: 136 MMOL/L (ref 133–144)

## 2017-11-01 PROCEDURE — 36415 COLL VENOUS BLD VENIPUNCTURE: CPT | Performed by: FAMILY MEDICINE

## 2017-11-01 PROCEDURE — 80048 BASIC METABOLIC PNL TOTAL CA: CPT | Performed by: FAMILY MEDICINE

## 2017-11-01 PROCEDURE — G0008 ADMIN INFLUENZA VIRUS VAC: HCPCS | Performed by: FAMILY MEDICINE

## 2017-11-01 PROCEDURE — 99397 PER PM REEVAL EST PAT 65+ YR: CPT | Mod: 25 | Performed by: FAMILY MEDICINE

## 2017-11-01 PROCEDURE — 82043 UR ALBUMIN QUANTITATIVE: CPT | Performed by: FAMILY MEDICINE

## 2017-11-01 PROCEDURE — 90662 IIV NO PRSV INCREASED AG IM: CPT | Performed by: FAMILY MEDICINE

## 2017-11-01 RX ORDER — ALENDRONATE SODIUM 70 MG/1
70 TABLET ORAL
Qty: 4 TABLET | Refills: 3 | Status: SHIPPED | OUTPATIENT
Start: 2017-11-01 | End: 2018-02-07

## 2017-11-01 RX ORDER — METOPROLOL TARTRATE 25 MG/1
25 TABLET, FILM COATED ORAL 2 TIMES DAILY
Qty: 180 TABLET | Refills: 1 | Status: SHIPPED | OUTPATIENT
Start: 2017-11-01 | End: 2018-04-25

## 2017-11-01 RX ORDER — LOSARTAN POTASSIUM 25 MG/1
TABLET ORAL
Qty: 90 TABLET | Refills: 1 | Status: SHIPPED | OUTPATIENT
Start: 2017-11-01 | End: 2018-04-25

## 2017-11-01 NOTE — PROGRESS NOTES
SUBJECTIVE:   Negin Magallon is a 77 year old female who presents for Preventive Visit.    Are you in the first 12 months of your Medicare Part B coverage?  No    Healthy Habits:    Do you get at least three servings of calcium containing foods daily (dairy, green leafy vegetables, etc.)? yes    Amount of exercise or daily activities, outside of work: no    Problems taking medications regularly No    Medication side effects: No    Have you had an eye exam in the past two years? yes    Do you see a dentist twice per year? yes    Do you have sleep apnea, excessive snoring or daytime drowsiness?no    COGNITIVE SCREEN  1) Repeat 3 items (Banana, Sunrise, Chair)  yes  2) Clock draw: NORMAL  3) 3 item recall:   Recalls 3 objects  Results: 3 items recalled: COGNITIVE IMPAIRMENT LESS LIKELY    Mini-CogTM Copyright S Josefina. Licensed by the author for use in [facility]; reprinted with permission (agustina@[facility]). All rights reserved.        Reviewed and updated as needed this visit by clinical staffTobacco  Allergies  Meds  Med Hx  Surg Hx  Fam Hx  Soc Hx        Reviewed and updated as needed this visit by Provider        Social History   Substance Use Topics     Smoking status: Former Smoker     Packs/day: 1.00     Years: 50.00     Types: Cigarettes     Start date: 3/14/1954     Quit date: 3/14/2004     Smokeless tobacco: Never Used     Alcohol use No      Comment: rarely,socially/1/2 months       The patient does not drink >3 drinks per day nor >7 drinks per week.    Today's PHQ-2 Score:   PHQ-2 ( 1999 Pfizer) 11/1/2017 10/5/2016   Q1: Little interest or pleasure in doing things 0 0   Q2: Feeling down, depressed or hopeless 0 0   PHQ-2 Score 0 0   Q1: Little interest or pleasure in doing things - -   Q2: Feeling down, depressed or hopeless - -   PHQ-2 Score - -         Do you feel safe in your environment - Yes    Do you have a Health Care Directive?: No: Advance care planning reviewed with patient;  information given to patient to review.      Current providers sharing in care for this patient include: Patient Care Team:  Ida Reveles MD as PCP - General (Family Practice)      Hearing impairment: yes    Ability to successfully perform activities of daily living: Yes, no assistance needed     Fall risk:  Fall Risk Assessment not completed.    Home safety:  none identified      The following health maintenance items are reviewed in Epic and correct as of today:  Health Maintenance   Topic Date Due     INFLUENZA VACCINE (SYSTEM ASSIGNED)  09/01/2017     LIPID SCREEN Q5 YR FEMALE (SYSTEM ASSIGNED)  10/19/2017     TSH Q1 YEAR  03/08/2018     FALL RISK ASSESSMENT  03/08/2018     BMP Q1 YR  08/16/2018     ADVANCE DIRECTIVE PLANNING Q5 YRS  07/16/2020     TETANUS IMMUNIZATION (SYSTEM ASSIGNED)  02/06/2022     DEXA SCAN SCREENING (SYSTEM ASSIGNED)  Completed     PNEUMOCOCCAL  Completed     Labs reviewed in EPIC  BP Readings from Last 3 Encounters:   11/01/17 154/74   08/16/17 153/62   06/12/17 162/78    Wt Readings from Last 3 Encounters:   11/01/17 160 lb (72.6 kg)   08/16/17 159 lb (72.1 kg)   06/12/17 159 lb (72.1 kg)                  Allergies   Allergen Reactions     Amoxicillin Swelling     tongue     Atorvastatin Calcium      Muscle aches.  Has also tried muvacor, provachol, lopid, and crestor with same results     Lisinopril Cough     Prevacid [Aspartame] Diarrhea     Advil [Ibuprofen] Rash     Prednisone Swelling and Rash     Medrol dose pack     Recent Labs   Lab Test  08/16/17   1220  03/08/17   1303  04/15/16   0916  03/18/16   1223  03/16/15   1613   10/19/12   0837   05/14/12   0904   09/27/11   1511   03/10/11   0835   A1C   --    --    --    --    --    --   5.7   --   6.0   --    --    --   5.7   LDL   --    --    --    --    --    --   212*   --    --    --   188*   --    --    HDL   --    --    --    --    --    --   41*   --    --    --   40*   --    --    TRIG   --    --    --    --     "--    --   310*   --    --    --   355*   --   400*   ALT   --   25   --   15  21   --    --    --    --    --    --    --    --    CR  0.97  0.96  0.98  1.08*  0.78   < >   --    < >  0.97   --   0.90   --   1.15*   GFRESTIMATED  56*  57*  55*  50*  72   < >   --    < >  57*   --   62   --   47*   GFRESTBLACK  67  68  67  60*  88   < >   --    < >  69   --   75   --   57*   POTASSIUM  5.2  4.9  4.4  4.2  4.1   < >   --    < >  4.3   --   4.3   < >  4.4   TSH   --   2.32  1.66  1.61  1.02   < >   --    --    --    < >   --    --   2.68    < > = values in this interval not displayed.                ROS:  Constitutional, HEENT, cardiovascular, pulmonary, gi and gu systems are negative, except as otherwise noted.      OBJECTIVE:   /74 (BP Location: Right arm, Patient Position: Sitting, Cuff Size: Adult Regular)  Pulse 56  Temp 97.7  F (36.5  C) (Oral)  Wt 160 lb (72.6 kg)  BMI 27.46 kg/m2 Estimated body mass index is 27.46 kg/(m^2) as calculated from the following:    Height as of 3/8/17: 5' 4\" (1.626 m).    Weight as of this encounter: 160 lb (72.6 kg).  EXAM:   GENERAL: healthy, alert and no distress  EYES: Eyes grossly normal to inspection, PERRL and conjunctivae and sclerae normal  HENT: ear canals and TM's normal, nose and mouth without ulcers or lesions  NECK: no adenopathy, no asymmetry, masses, or scars and thyroid normal to palpation  RESP: lungs clear to auscultation - no rales, rhonchi or wheezes  BREAST: normal without masses, tenderness or nipple discharge and no palpable axillary masses or adenopathy  CV: regular rate and rhythm, normal S1 S2, no S3 or S4, no murmur, click or rub, no peripheral edema and peripheral pulses strong  ABDOMEN: soft, nontender, no hepatosplenomegaly, no masses and bowel sounds normal  MS: no gross musculoskeletal defects noted, no edema  SKIN: no suspicious lesions or rashes  NEURO: Normal strength and tone, mentation intact and speech normal  PSYCH: mentation appears " "normal, affect normal/bright    ASSESSMENT / PLAN:       ICD-10-CM    1. Routine general medical examination at a health care facility Z00.00    2. Hypertension goal BP (blood pressure) < 140/90 I10 Basic metabolic panel  (Ca, Cl, CO2, Creat, Gluc, K, Na, BUN)     Albumin Random Urine Quantitative with Creat Ratio   3. Need for prophylactic vaccination and inoculation against influenza Z23 FLU VACCINE, INCREASED ANTIGEN, PRESV FREE, AGE 65+ [39864]     ADMIN INFLUENZA (For MEDICARE Patients ONLY) []     Pt had her home BP readings with her. Most of her readings were less than 140/90.   Pt had HHN coming after her forearm surgery, BPs were noted within goal during those checks as well.   Considering this , continue with same BP medications.     End of Life Planning:  Patient currently has an advanced directive: No.  I have verified the patient's ablity to prepare an advanced directive/make health care decisions.  Literature was provided to assist patient in preparing an advanced directive. Per pt she had submitted in 1990s, nothing is scanned in her chart , pt will go through the documents and submit the forms soon.     COUNSELING:  Reviewed preventive health counseling, as reflected in patient instructions    Estimated body mass index is 27.46 kg/(m^2) as calculated from the following:    Height as of 3/8/17: 5' 4\" (1.626 m).    Weight as of this encounter: 160 lb (72.6 kg).   reports that she quit smoking about 13 years ago. Her smoking use included Cigarettes. She started smoking about 63 years ago. She has a 50.00 pack-year smoking history. She has never used smokeless tobacco.           Appropriate preventive services were discussed with this patient, including applicable screening as appropriate for cardiovascular disease, diabetes, osteopenia/osteoporosis, and glaucoma.  As appropriate for age/gender, discussed screening for colorectal cancer, prostate cancer, breast cancer, and cervical cancer. Checklist " reviewing preventive services available has been given to the patient.    Reviewed patients plan of care and provided an AVS. The Basic Care Plan (routine screening as documented in Health Maintenance) for Negin meets the Care Plan requirement. This Care Plan has been established and reviewed with the Patient.    Counseling Resources:  ATP IV Guidelines  Pooled Cohorts Equation Calculator  Breast Cancer Risk Calculator  FRAX Risk Assessment  ICSI Preventive Guidelines  Dietary Guidelines for Americans, 2010  USDA's MyPlate  ASA Prophylaxis  Lung CA Screening    Ida Reveles MD  [facility] Influenza Immunization Documentation    1.  Is the person to be vaccinated sick today?   No    2. Does the person to be vaccinated have an allergy to a component   of the vaccine?   No  Egg Allergy Algorithm Link    3. Has the person to be vaccinated ever had a serious reaction   to influenza vaccine in the past?   No    4. Has the person to be vaccinated ever had Guillain-Barré syndrome?   No    Form completed by Yuridia Kim MA   Patient instructed to remain in clinic for 15 minutes afterwards, and to report any adverse reaction to me immediately.

## 2017-11-01 NOTE — NURSING NOTE
"Chief Complaint   Patient presents with     Physical       Initial /74 (BP Location: Left arm, Patient Position: Sitting, Cuff Size: Adult Large)  Pulse 56  Temp 97.7  F (36.5  C) (Oral)  Wt 160 lb (72.6 kg)  BMI 27.46 kg/m2 Estimated body mass index is 27.46 kg/(m^2) as calculated from the following:    Height as of 3/8/17: 5' 4\" (1.626 m).    Weight as of this encounter: 160 lb (72.6 kg).  Medication Reconciliation: complete  Yuridia Kim MA    "

## 2017-11-01 NOTE — MR AVS SNAPSHOT
After Visit Summary   11/1/2017    Negin Magallon    MRN: [id number]           Patient Information     Date Of Birth          1940        Visit Information        Provider Department      11/1/2017 11:00 AM Ida Reveles MD [facility]        Today's Diagnoses     Routine general medical examination at a health care facility    -  1    Hypertension goal BP (blood pressure) < 140/90        Need for prophylactic vaccination and inoculation against influenza        Essential hypertension with goal blood pressure less than 140/90        Osteoporosis, unspecified osteoporosis type, unspecified pathological fracture presence          Care Instructions      Preventive Health Recommendations    Female Ages 65 +    Yearly exam:     See your health care provider every year in order to  o Review health changes.   o Discuss preventive care.    o Review your medicines if your doctor has prescribed any.      You no longer need a yearly Pap test unless you've had an abnormal Pap test in the past 10 years. If you have vaginal symptoms, such as bleeding or discharge, be sure to talk with your provider about a Pap test.      Every 1 to 2 years, have a mammogram.  If you are over 69, talk with your health care provider about whether or not you want to continue having screening mammograms.      Every 10 years, have a colonoscopy. Or, have a yearly FIT test (stool test). These exams will check for colon cancer.       Have a cholesterol test every 5 years, or more often if your doctor advises it.       Have a diabetes test (fasting glucose) every three years. If you are at risk for diabetes, you should have this test more often.       At age 65, have a bone density scan (DEXA) to check for osteoporosis (brittle bone disease).    Shots:    Get a flu shot each year.    Get a tetanus shot every 10 years.    Talk to your doctor about your pneumonia vaccines. There are now two you should  receive - Pneumovax (PPSV 23) and Prevnar (PCV 13).    Talk to your doctor about the shingles vaccine.    Talk to your doctor about the hepatitis B vaccine.    Nutrition:     Eat at least 5 servings of fruits and vegetables each day.      Eat whole-grain bread, whole-wheat pasta and brown rice instead of white grains and rice.      Talk to your provider about Calcium and Vitamin D.     Lifestyle    Exercise at least 150 minutes a week (30 minutes a day, 5 days a week). This will help you control your weight and prevent disease.      Limit alcohol to one drink per day.      No smoking.       Wear sunscreen to prevent skin cancer.       See your dentist twice a year for an exam and cleaning.      See your eye doctor every 1 to 2 years to screen for conditions such as glaucoma, macular degeneration and cataracts.          Follow-ups after your visit        Your next 10 appointments already scheduled     Nov 07, 2017  3:00 PM CST   Cochlear Implant Brief with Amee Wynne [facility] ([facility] [place])    [address] [place] [address] [id number] [phone]            Jun 06, 2018 10:30 AM CDT   Cochlear Implant Brief with Amee Wynne [facility] ([facility])    [address] [place] [id number] [phone]              Who to contact     If you have questions or need follow up information about today's clinic visit or your schedule please contact [facility] directly at [phone].  Normal or non-critical lab and imaging results will be communicated to you by MyChart, letter or phone within 4 business days after the clinic has received the results. If you do not hear from us within 7 days, please contact the clinic through MyChart or phone. If you have a critical or abnormal lab result, we will notify you by phone as soon as possible.  Submit refill requests  through [employer] or call your pharmacy and they will forward the refill request to us. Please allow 3 business days for your refill to be completed.          Additional Information About Your Visit        [email or web address] Information     [employer] gives you secure access to your electronic health record. If you see a primary care provider, you can also send messages to your care team and make appointments. If you have questions, please call your primary care clinic.  If you do not have a primary care provider, please call [phone] and they will assist you.        Care EveryWhere ID     This is your Care EveryWhere ID. This could be used by other organizations to access your [place] medical records  [id number]        Your Vitals Were     Pulse Temperature BMI (Body Mass Index)             56 97.7  F (36.5  C) (Oral) 27.46 kg/m2          Blood Pressure from Last 3 Encounters:   11/01/17 157/74   08/16/17 153/62   06/12/17 162/78    Weight from Last 3 Encounters:   11/01/17 160 lb (72.6 kg)   08/16/17 159 lb (72.1 kg)   06/12/17 159 lb (72.1 kg)              We Performed the Following     ADMIN INFLUENZA (For MEDICARE Patients ONLY) []     Albumin Random Urine Quantitative with Creat Ratio     Basic metabolic panel  (Ca, Cl, CO2, Creat, Gluc, K, Na, BUN)     FLU VACCINE, INCREASED ANTIGEN, PRESV FREE, AGE 65+ [28387]          Today's Medication Changes          These changes are accurate as of: 11/1/17 11:52 AM.  If you have any questions, ask your nurse or doctor.               These medicines have changed or have updated prescriptions.        Dose/Directions    losartan 25 MG tablet   Commonly known as:  COZAAR   This may have changed:  See the new instructions.   Used for:  Hypertension goal BP (blood pressure) < 140/90   Changed by:  Ida Reveles MD        TAKE 1 TABLET(25 MG) BY MOUTH DAILY   Quantity:  90 tablet   Refills:  1            Where to get your medicines      These medications were sent to  [id number] - [place], [place] - [address] AT [address]  [address], [place] [address]     Phone:  [phone]     alendronate 70 MG tablet    losartan 25 MG tablet    metoprolol 25 MG tablet                Primary Care Provider Office Phone # Fax #    Ida Bryan Reveles [phone]       [address] [facility] [address]        Equal Access to Services     CIARRA HUNTER : Hadii aad ku hadasho Soomaali, waaxda luqadaha, qaybta kaalmada adeegyada, waxay idiin hayaan adeeg josh maldonado . So [facility] [phone].    ATENCIÓN: Si habla español, tiene a john disposición servicios gratuitos de asistencia lingüística. Llame al [phone].    We comply with applicable federal civil rights laws and [place] laws. We do not discriminate on the basis of race, color, national origin, age, disability, sex, sexual orientation, or gender identity.            Thank you!     Thank you for choosing [facility]  for your care. Our goal is always to provide you with excellent care. Hearing back from our patients is one way we can continue to improve our services. Please take a few minutes to complete the written survey that you may receive in the mail after your visit with us. Thank you!             Your Updated Medication List - Protect others around you: Learn how to safely use, store and throw away your medicines at www.disposemymeds.org.          This list is accurate as of: 11/1/17 11:52 AM.  Always use your most recent med list.                   Brand Name Dispense Instructions for use Diagnosis    alendronate 70 MG tablet    FOSAMAX    4 tablet    Take 1 tablet (70 mg) by mouth every 7 days Take 60 minutes before am meal with 8 oz. water. Remain upright for 30 minutes.    Osteoporosis, unspecified osteoporosis type, unspecified pathological fracture presence       CALCIUM + D PO      petite 400/500 2  daily        levothyroxine 50 MCG tablet    SYNTHROID/LEVOTHROID    90 tablet    Take 1 tablet (50 mcg) by mouth daily    Hypothyroidism, unspecified type       losartan 25 MG tablet    COZAAR    90 tablet    TAKE 1 TABLET(25 MG) BY MOUTH DAILY    Hypertension goal BP (blood pressure) < 140/90       meclizine 25 MG tablet    ANTIVERT    180 tablet    Take 1 tablet (25 mg) by mouth 2 times daily    Meniere's disease, unspecified laterality       metoprolol 25 MG tablet    LOPRESSOR    180 tablet    Take 1 tablet (25 mg) by mouth 2 times daily    Essential hypertension with goal blood pressure less than 140/90       STATIN NOT PRESCRIBED (INTENTIONAL)      by Other route continuous prn.    Hyperlipidemia LDL goal <130       TYLENOL 325 MG tablet   Generic drug:  acetaminophen      Take 1-2 tablets by mouth every 6 hours as needed. As needed

## 2017-11-02 DIAGNOSIS — R80.9 PROTEINURIA, UNSPECIFIED TYPE: Primary | ICD-10-CM

## 2017-11-02 NOTE — PROGRESS NOTES
Dear Negin Magallon,     Your sodium, potassium are normal.   Kidney function is stable.   Blood glucose is normal if it is non fasting labs, let me know if you were fasting for the labs.   You are loosing proteins in urine, take your blood pressure medications regularly, drink enough water. I will recheck your urine protein levels in 3 month. You can come for lab only appointment.     Let me know if you have any other questions.     Ida Reveles MD.   Family Physician.  [facility].

## 2017-11-05 ENCOUNTER — MYC MEDICAL ADVICE (OUTPATIENT)
Dept: FAMILY MEDICINE | Facility: CLINIC | Age: 77
End: 2017-11-05

## 2017-11-06 NOTE — TELEPHONE ENCOUNTER
Routing to PCP to review and advise.    Patient clarifying was not fasting for Glucose test at last procedure.     Mary Francisco RN  [facility]

## 2017-11-07 ENCOUNTER — OFFICE VISIT (OUTPATIENT)
Dept: AUDIOLOGY | Facility: CLINIC | Age: 77
End: 2017-11-07

## 2017-11-07 DIAGNOSIS — H90.3 SENSORY HEARING LOSS, BILATERAL: Primary | ICD-10-CM

## 2017-11-07 NOTE — MR AVS SNAPSHOT
After Visit Summary   11/7/2017    Negin Magallon    MRN: [id number]           Patient Information     Date Of Birth          1940        Visit Information        Provider Department      11/7/2017 3:00 PM Annette Colbert AuD M [facility]        Today's Diagnoses     Sensory hearing loss, bilateral    -  1       Follow-ups after your visit        Your next 10 appointments already scheduled     Jun 06, 2018 10:30 AM CDT   Cochlear Implant Brief with Amee Wynne [facility] ([facility] [place])    [address] [place] [id number] [phone]              Who to contact     Please call your clinic at [phone] to:    Ask questions about your health    Make or cancel appointments    Discuss your medicines    Learn about your test results    Speak to your doctor   If you have compliments or concerns about an experience at your clinic, or if you wish to file a complaint, please contact [facility] Patient Relations at [phone] or email us at [email or web address]         Additional Information About Your Visit        MyChart Information     [employer] gives you secure access to your electronic health record. If you see a primary care provider, you can also send messages to your care team and make appointments. If you have questions, please call your primary care clinic.  If you do not have a primary care provider, please call [phone] and they will assist you.      [employer] is an electronic gateway that provides easy, online access to your medical records. With [employer], you can request a clinic appointment, read your test results, renew a prescription or communicate with your care team.     To access your existing account, please contact your [facility] or call [phone] for assistance.        Care EveryWhere ID     This is your Care EveryWhere  ID. This could be used by other organizations to access your [place] medical records  [id number]         Blood Pressure from Last 3 Encounters:   11/01/17 157/74   08/16/17 153/62   06/12/17 162/78    Weight from Last 3 Encounters:   11/01/17 72.6 kg (160 lb)   08/16/17 72.1 kg (159 lb)   06/12/17 72.1 kg (159 lb)              We Performed the Following     RT: Diagnostic Analysis of CI 7 yrs & over, Subsequent Programming   ([id number])        Primary Care Provider Office Phone # Fax #    Ida Byran Reveles [phone]       [address] [facility] [address]        Equal Access to Services     CIARRA HUNTER : Hadii aad ku hadasho Somary, waaxda luqadaha, qaybta kaalmada adeegyada, saw borges. So [facility] [phone].    ATENCIÓN: Si habla español, tiene a john disposición servicios gratuitos de asistencia lingüística. Ll[facility] [phone].    We comply with applicable federal civil rights laws and [place] laws. We do not discriminate on the basis of race, color, national origin, age, disability, sex, sexual orientation, or gender identity.            Thank you!     Thank you for choosing [facility]  for your care. Our goal is always to provide you with excellent care. Hearing back from our patients is one way we can continue to improve our services. Please take a few minutes to complete the written survey that you may receive in the mail after your visit with us. Thank you!             Your Updated Medication List - Protect others around you: Learn how to safely use, store and throw away your medicines at www.disposemymeds.org.          This list is accurate as of: 11/7/17  4:20 PM.  Always use your most recent med list.                   Brand Name Dispense Instructions for use Diagnosis    alendronate 70 MG tablet    FOSAMAX    4 tablet    Take 1 tablet (70 mg) by mouth every 7 days Take 60 minutes before am meal with 8 oz. water. Remain  upright for 30 minutes.    Osteoporosis, unspecified osteoporosis type, unspecified pathological fracture presence       CALCIUM + D PO      petite 400/500 2 daily        levothyroxine 50 MCG tablet    SYNTHROID/LEVOTHROID    90 tablet    Take 1 tablet (50 mcg) by mouth daily    Hypothyroidism, unspecified type       losartan 25 MG tablet    COZAAR    90 tablet    TAKE 1 TABLET(25 MG) BY MOUTH DAILY    Hypertension goal BP (blood pressure) < 140/90       meclizine 25 MG tablet    ANTIVERT    180 tablet    Take 1 tablet (25 mg) by mouth 2 times daily    Meniere's disease, unspecified laterality       metoprolol 25 MG tablet    LOPRESSOR    180 tablet    Take 1 tablet (25 mg) by mouth 2 times daily    Essential hypertension with goal blood pressure less than 140/90       STATIN NOT PRESCRIBED (INTENTIONAL)      by Other route continuous prn.    Hyperlipidemia LDL goal <130       TYLENOL 325 MG tablet   Generic drug:  acetaminophen      Take 1-2 tablets by mouth every 6 hours as needed. As needed

## 2017-11-07 NOTE — PROGRESS NOTES
"AUDIOLOGY REPORT    BACKGROUND INFORMATION: Dr. Luis Dillon implanted Negin Magallon with a right  Nucleus  cochlear implant 08/2/2010 but had device failure, was explanted and reimplanted on 06/05/2011.  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 The patient was seen for programming advice with investigation by Melissa De Jesus from [employer] on 07/02/2012, and patient felt sound was best then.  She is being seen 11/7/2017 in Audiology at the [facility] [place] for follow up programming with 910 and 810 processors.   She was seen 10/17/2016 for upgrading processors.     PATIENT REPORT: She has not been wearing the new 910 processor because \"they don't work\"-everyone sounds screechy or under water..  She has been remapped several times, but had not liked the sound.  She also does not like the Wind and SCAN.  She was remapped 6/5/2017 and although she reported she heard better at that time, when she went home, she felt it was not clear for both the 910 and 810 processors. She feels the mapping in July 2012 with Melissa Hodges was the best.  She also feels when she first turns it on it sounds best then the sound fades. She feels it shuts down in noise.    PROBLEM LISTS AND TRIALS:  1.  First implant:  After a few months-November 2010, she noted a loud sound and tinnitus with and without the implant, and she felt she had problems with perception and consistency of sound since then. She had frequent complaints that clanging plates sounded uncomfortable and intermittency of sound. She had a device failure documented in May 2011.  2.  Second implant:  She was initially programmed 6/14/2011.    7/5/11- One week later, " "she noted interruptions of sound.  Normal electrode impedances.  8/2/11 Notes intermittency and can no longer hear birds.  Sera De Jesus from [employer] came for investigation and assistance in programming.  Low frequency electrode impedances are reducing.  8/15/11 Seems to hear better after programming.  Electrodes 15-22 impedances are low.  9/13/11 Notes a humming sound, decreased performance, and intermittency.  Good speech perception results for 3 months review  1/31/12 Notes intermittency and dizziness  2/20/12 Notes a humming sound, intermittency, and feels her benefit has diminished.  She also notes a numbness in her head.  ENT evaluation was recommended.  Improving speech perception results for 6 month review  5/21/12 Feels the implant is too loud, hissing sound, and not clear.  Reprogrammed and she left liking the sound.  Trials with frequency tables, with no consistent perception  6/5/12 She dislikes the new program, and notes a screeching sound and continued intermittency.  Electrode 1-3 disabled.  She notes a feeling in her neck for electrode 1 when doing NRT  7/2/2012 Programming seemed to resolve much of the problems  2/13/2017 She continues to complain that speech is not clear enough, and the instruments shut down in noise.  The noise and wind circuit were disabled.    Previously tried:  Turning off electrodes 15-22, but she hated the sound.  Enabling electrodes 15-22 and reprogramming and trials with frequency tables.  She preferred table 21.  6/5/2017 As she continues to complain that it shuts down, the Autosensitivity (ACS) was changed to Less 60 as the default was 55.  6/19/2017 It does not \"shut down\" but speech is no longer clear.  She liked her previous mapping better so they were installed.  11/7/2017:  Both processors sound bad, and she has not worn the new processor.  She remembers the best program was with Ms Hinds, so that was reinstalled for both the 810 and 910.  She reported they both " sounded better.    FITTING SESSION: Dr. Luis Dillon, cochlear implant surgeon, ordered today's appointment. The patient came to the clinic for adjustment to the programs in the external speech processor and for assessment of the external components of the cochlear implant system. These components provide power and data to the internal device. Sound is only heard once the external portion is activated. Postoperative treatment, including device fitting and adjustment, audiologic assessments, and training are required at regular intervals.        Programming included psychophysical measures of comfort, threshold, and loudness balance, and adjustments of frequency tables and electrodes.  Processor type: 910 and 810  Headpiece type: Standard   Magnet strength: 2    TEST RESULTS:    Electrode Impedances: Slightly higher but not high  Neural Response Testing: DNT  Facial Stimulation: Absent  Tinnitus: Present  Balance Problems: Occasional from the Menieres disease, and she fell because of it.  She is in physical therapy  Pain/Discomfort: None  Strategies Tried:     Programs 910 Processor: Her program 34 from the 810 processor was transferred, with volume 5 and sensitivity 10  1. 46 HOME Adro, No ASC No noise  2. 46  HOME Adro, Autosensitivy; No noise  3. 46 GROUP (noise);Zoom, Autosensitivity; Adro, Noise  4. 46 CAFE (focus)  Beam; Autosensitivity; Adro; Noise  Number of Channels per Program: 2-22 enabled.    Programs: 810 processor (volume 5; sensitivity 10)  1. 34 new map, with standard FAT 21 (188-7938); Everyday (Adro )  2. 34 new map, with standard FAT 21 (188-7938); Everyday (Adro with Autosensitivity)  3. 34 Noise  Zoom, Autosensitivity; Adro  4. 34 Focus Beam, Autosensitivity, Adro    SUMMARY AND RECOMMENDATIONS: Ms Magallon was seen for follow-up programming with her 910 and 810 processors. Her old map was installed, and at least for the appointment, she seemed pleased. She would like to have an  appointment with Ms Hinds, and that is being arranged.   Questions were answered.    Betzy Sevilla, CCC-A  Licensed Audiologist  [place] #[id number]

## 2017-11-20 ENCOUNTER — OFFICE VISIT (OUTPATIENT)
Dept: AUDIOLOGY | Facility: CLINIC | Age: 77
End: 2017-11-20

## 2017-11-20 DIAGNOSIS — H90.3 SENSORY HEARING LOSS, BILATERAL: Primary | ICD-10-CM

## 2017-11-20 NOTE — MR AVS SNAPSHOT
After Visit Summary   11/20/2017    Negin Magallon    MRN: [id number]           Patient Information     Date Of Birth          1940        Visit Information        Provider Department      11/20/2017 11:00 AM Annette Colbert AuD M [facility] Audiology        Today's Diagnoses     Sensory hearing loss, bilateral    -  1       Follow-ups after your visit        Your next 10 appointments already scheduled     Jan 08, 2018 11:00 AM CST   Cochlear Implant Brief with Amee Bashir [facility] Audiology ([facility])    [address] [place] [id number] [phone]            Jun 06, 2018 10:30 AM CDT   Cochlear Implant Brief with Amee Wynne [facility] Audiology ([facility])    [address] [place] [id number] [phone]              Who to contact     Please call your clinic at [phone] to:    Ask questions about your health    Make or cancel appointments    Discuss your medicines    Learn about your test results    Speak to your doctor   If you have compliments or concerns about an experience at your clinic, or if you wish to file a complaint, please contact [facility] Patient Relations at [phone] or email us at [email or web address]         Additional Information About Your Visit        MyChart Information     [employer] gives you secure access to your electronic health record. If you see a primary care provider, you can also send messages to your care team and make appointments. If you have questions, please call your primary care clinic.  If you do not have a primary care provider, please call [phone] and they will assist you.      [employer] is an electronic gateway that provides easy, online access to your medical records. With [employer], you can request a clinic appointment, read your test results, renew a prescription or  communicate with your care team.     To access your existing account, please contact your [facility] or call [phone] for assistance.        Care EveryWhere ID     This is your Care EveryWhere ID. This could be used by other organizations to access your [place] medical records  [id number]         Blood Pressure from Last 3 Encounters:   11/01/17 157/74   08/16/17 153/62   06/12/17 162/78    Weight from Last 3 Encounters:   11/01/17 72.6 kg (160 lb)   08/16/17 72.1 kg (159 lb)   06/12/17 72.1 kg (159 lb)              We Performed the Following     RT: Diagnostic Analysis of CI 7 yrs & over, Subsequent Programming   (76543)        Primary Care Provider Office Phone # Fax #    Ida Bryan Reveles [phone]       [address] [facility] [address]        Equal Access to Services     [facility] HospitalCHRISTIAN : Hadii aad ku hadasho Somonyali, waaxda luqadaha, qaybta kaalmada adeegyada, waxsergio thurstonin fernandon ruthann maldonado . So [facility] [phone].    ATENCIÓN: Si habla español, tiene a john disposición servicios gratuitos de asistencia lingüística. Llame al [phone].    We comply with applicable federal civil rights laws and [place] laws. We do not discriminate on the basis of race, color, national origin, age, disability, sex, sexual orientation, or gender identity.            Thank you!     Thank you for choosing [facility] AUDIOLOGY  for your care. Our goal is always to provide you with excellent care. Hearing back from our patients is one way we can continue to improve our services. Please take a few minutes to complete the written survey that you may receive in the mail after your visit with us. Thank you!             Your Updated Medication List - Protect others around you: Learn how to safely use, store and throw away your medicines at www.disposemymeds.org.          This list is accurate as of: 11/20/17  5:01 PM.  Always use your most recent med list.                    Brand Name Dispense Instructions for use Diagnosis    alendronate 70 MG tablet    FOSAMAX    4 tablet    Take 1 tablet (70 mg) by mouth every 7 days Take 60 minutes before am meal with 8 oz. water. Remain upright for 30 minutes.    Osteoporosis, unspecified osteoporosis type, unspecified pathological fracture presence       CALCIUM + D PO      petite 400/500 2 daily        levothyroxine 50 MCG tablet    SYNTHROID/LEVOTHROID    90 tablet    Take 1 tablet (50 mcg) by mouth daily    Hypothyroidism, unspecified type       losartan 25 MG tablet    COZAAR    90 tablet    TAKE 1 TABLET(25 MG) BY MOUTH DAILY    Hypertension goal BP (blood pressure) < 140/90       meclizine 25 MG tablet    ANTIVERT    180 tablet    Take 1 tablet (25 mg) by mouth 2 times daily    Meniere's disease, unspecified laterality       metoprolol 25 MG tablet    LOPRESSOR    180 tablet    Take 1 tablet (25 mg) by mouth 2 times daily    Essential hypertension with goal blood pressure less than 140/90       STATIN NOT PRESCRIBED (INTENTIONAL)      by Other route continuous prn.    Hyperlipidemia LDL goal <130       TYLENOL 325 MG tablet   Generic drug:  acetaminophen      Take 1-2 tablets by mouth every 6 hours as needed. As needed

## 2017-11-20 NOTE — PROGRESS NOTES
AUDIOLOGY REPORT    BACKGROUND INFORMATION: Dr. Luis Dillon implanted Negin Magallon with a right  Nucleus  cochlear implant 08/2/2010 but had device failure, was explanted and reimplanted on 06/05/2011.  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 The patient was seen for programming advice with investigation by Melissa De Jesus from [employer] on 07/02/2012, and patient felt sound was best then.  She is being seen 11/20/2017 in Audiology at the [facility] [place] for follow up programming with 910 and 810 processors.   Melissa Hinds, Clinical Specialist from [employer] was at the appointment. She was last seen 11/7/2017.  She was seen 10/17/2016 for upgrading processors.     PATIENT REPORT: She was last seen for reprogramming 2 weeks prior.  At that time, her old program was reinserted. She said it sounded good at that time, but when she got home she was not satisfied. She still does not like the 910 processor, and with the 810 she feels low frequencies are too loud. The 910 are too noisy.  She has been remapped several times, but had not liked the sound.  She also does not like the Wind and SCAN.  She was remapped 6/5/2017 and although she reported she heard better at that time, when she went home, she felt it was not clear for both the 910 and 810 processors. She feels the mapping in July 2012 with Melissa Hodges was the best.  She also feels when she first turns it on it sounds best then the sound fades. She feels it shuts down in noise.    PROBLEM LISTS AND TRIALS:  1.  First implant:  After a few months-November 2010, she noted a loud sound and tinnitus with and without the implant, and she felt she had  "problems with perception and consistency of sound since then. She had frequent complaints that clanging plates sounded uncomfortable and intermittency of sound. She had a device failure documented in May 2011.  2.  Second implant:  She was initially programmed 6/14/2011.    7/5/11- One week later, she noted interruptions of sound.  Normal electrode impedances.  8/2/11 Notes intermittency and can no longer hear birds.  Sera Utuado from [email or web address] came for investigation and assistance in programming.  Low frequency electrode impedances are reducing.  8/15/11 Seems to hear better after programming.  Electrodes 15-22 impedances are low.  9/13/11 Notes a humming sound, decreased performance, and intermittency.  Good speech perception results for 3 months review  1/31/12 Notes intermittency and dizziness  2/20/12 Notes a humming sound, intermittency, and feels her benefit has diminished.  She also notes a numbness in her head.  ENT evaluation was recommended.  Improving speech perception results for 6 month review  5/21/12 Feels the implant is too loud, hissing sound, and not clear.  Reprogrammed and she left liking the sound.  Trials with frequency tables, with no consistent perception  6/5/12 She dislikes the new program, and notes a screeching sound and continued intermittency.  Electrode 1-3 disabled.  She notes a feeling in her neck for electrode 1 when doing NRT  7/2/2012 Programming seemed to resolve much of the problems  2/13/2017 She continues to complain that speech is not clear enough, and the instruments shut down in noise.  The noise and wind circuit were disabled.    Previously tried:  Turning off electrodes 15-22, but she hated the sound.  Enabling electrodes 15-22 and reprogramming and trials with frequency tables.  She preferred table 21.  6/5/2017 As she continues to complain that it shuts down, the Autosensitivity (ACS) was changed to Less 60 as the default was 55.  6/19/2017 It does not \"shut down\" " but speech is no longer clear.  She liked her previous mapping better so they were installed.  11/7/2017:  Both processors sound bad, and she has not worn the new processor.  She remembers the best program was with Ms Hinds, so that was reinstalled for both the 810 and 910.  She reported they both sounded better.  11/20/2017:  1. Increased Cs 3 units, and she did not like the sound;  2.  Increased Ts 3 units and she felt it sounded better, but 6 was too much; 3. The response was tilted with the lows higher and she did not like it; 4. Electrodes 2 and 3 were disabled and she did not like it; 5. The Frequency Allocation Table was changed to 188 to 6938 and she felt it was much better; 6. A different 910 processor was tried and she felt it sounded better than hers so it will be exchanged      FITTING SESSION: Dr. Lusi Dillon, cochlear implant surgeon, ordered today's appointment. The patient came to the clinic for adjustment to the programs in the external speech processor and for assessment of the external components of the cochlear implant system. These components provide power and data to the internal device. Sound is only heard once the external portion is activated. Postoperative treatment, including device fitting and adjustment, audiologic assessments, and training are required at regular intervals.        Programming included psychophysical measures of comfort, threshold, and loudness balance, and adjustments of frequency tables and electrodes.  Processor type: 910 and 810  Headpiece type: Standard   Magnet strength: 2    TEST RESULTS:    Electrode Impedances: Slightly higher but not high  Neural Response Testing: DNT  Facial Stimulation: Absent  Tinnitus: Present  Balance Problems: Occasional from the Menieres disease, and she fell because of it.  She is in physical therapy  Pain/Discomfort: None  Strategies Tried:     Programs 910 Processor: Her program 34 from the 810 processor was transferred,  with volume 5 and sensitivity 10  1. 47 HOME Adro, No ASC No noise  2. 47  HOME Adro, Autosensitivy; No noise  3. 47 GROUP (noise);Zoom, Autosensitivity; Adro, Noise  4. 47 CAFE (focus)  Beam; Autosensitivity; Adro; Noise  Number of Channels per Program: 2-22 enabled.    Programs: 810 processor (volume 5; sensitivity 10)  1. 48 new map, with standard FAT 21 (188-7938); Everyday (Adro )  2. 48 new map, with standard FAT 21 (188-7938); Everyday (Adro with Autosensitivity)  3. 48 Noise  Zoom, Autosensitivity; Adro  4. 48 Focus Beam, Autosensitivity, Adro    SUMMARY AND RECOMMENDATIONS: Ms Magallon was seen for follow-up programming with her 910 and 810 processors. Adjustments were made with the support of Ms Lizet and the patient seemed pleased. He 910 processor will be exchanged and patient will return in one month with Ms Hinds.   Questions were answered.    Betzy Sevilla, CCC-A  Licensed Audiologist  [place] #[id number]

## 2017-12-18 ENCOUNTER — TRANSFERRED RECORDS (OUTPATIENT)
Dept: HEALTH INFORMATION MANAGEMENT | Facility: CLINIC | Age: 77
End: 2017-12-18

## 2017-12-18 LAB — HEMOCCULT STL QL IA: NORMAL

## 2018-01-08 ENCOUNTER — OFFICE VISIT (OUTPATIENT)
Dept: AUDIOLOGY | Facility: CLINIC | Age: 78
End: 2018-01-08
Payer: COMMERCIAL

## 2018-01-08 DIAGNOSIS — H90.3 SENSORY HEARING LOSS, BILATERAL: Primary | ICD-10-CM

## 2018-01-08 NOTE — MR AVS SNAPSHOT
After Visit Summary   1/8/2018    Negin Magallon    MRN: [id number]           Patient Information     Date Of Birth          1940        Visit Information        Provider Department      1/8/2018 11:00 AM Annette Colbert AuD M [facility] Audiology        Today's Diagnoses     Sensory hearing loss, bilateral    -  1       Follow-ups after your visit        Your next 10 appointments already scheduled     Jun 06, 2018 10:30 AM CDT   Cochlear Implant Brief with Amee Wynne [facility] Audiology ([facility] [place])    [address] [place] [address] [phone]              Who to contact     Please call your clinic at [phone] to:    Ask questions about your health    Make or cancel appointments    Discuss your medicines    Learn about your test results    Speak to your doctor   If you have compliments or concerns about an experience at your clinic, or if you wish to file a complaint, please contact [facility] Patient Relations at [phone] or email us at [email or web address]         Additional Information About Your Visit        MyChart Information     [employer] gives you secure access to your electronic health record. If you see a primary care provider, you can also send messages to your care team and make appointments. If you have questions, please call your primary care clinic.  If you do not have a primary care provider, please call [phone] and they will assist you.      [employer] is an electronic gateway that provides easy, online access to your medical records. With [employer], you can request a clinic appointment, read your test results, renew a prescription or communicate with your care team.     To access your existing account, please contact your [facility] or call [phone] for assistance.        Care EveryWhere ID     This is your Care EveryWhere ID.  This could be used by other organizations to access your [place] medical records  [id number]         Blood Pressure from Last 3 Encounters:   11/01/17 157/74   08/16/17 153/62   06/12/17 162/78    Weight from Last 3 Encounters:   11/01/17 72.6 kg (160 lb)   08/16/17 72.1 kg (159 lb)   06/12/17 72.1 kg (159 lb)              We Performed the Following     RT: Diagnostic Analysis of CI 7 yrs & over, Subsequent Programming   ([id number])        Primary Care Provider Office Phone # Fax #    Ida Bryan Reveles [phone]       [address] [facility] [address]        Equal Access to Services     CIARRA HUNTER : Hadii anahy lirianoo Somary, waaxda luqadaha, qaybta kaalmada adeegyada, saw maldonado . So [facility] [phone].    ATENCIÓN: Si habla español, tiene a john disposición servicios gratuitos de asistencia lingüística. Ll[facility] [phone].    We comply with applicable federal civil rights laws and [place] laws. We do not discriminate on the basis of race, color, national origin, age, disability, sex, sexual orientation, or gender identity.            Thank you!     Thank you for choosing [facility] AUDIOLOGY  for your care. Our goal is always to provide you with excellent care. Hearing back from our patients is one way we can continue to improve our services. Please take a few minutes to complete the written survey that you may receive in the mail after your visit with us. Thank you!             Your Updated Medication List - Protect others around you: Learn how to safely use, store and throw away your medicines at www.disposemymeds.org.          This list is accurate as of: 1/8/18  2:17 PM.  Always use your most recent med list.                   Brand Name Dispense Instructions for use Diagnosis    alendronate 70 MG tablet    FOSAMAX    4 tablet    Take 1 tablet (70 mg) by mouth every 7 days Take 60 minutes before am meal with 8 oz. water. Remain upright  for 30 minutes.    Osteoporosis, unspecified osteoporosis type, unspecified pathological fracture presence       CALCIUM + D PO      petite 400/500 2 daily        levothyroxine 50 MCG tablet    SYNTHROID/LEVOTHROID    90 tablet    Take 1 tablet (50 mcg) by mouth daily    Hypothyroidism, unspecified type       losartan 25 MG tablet    COZAAR    90 tablet    TAKE 1 TABLET(25 MG) BY MOUTH DAILY    Hypertension goal BP (blood pressure) < 140/90       meclizine 25 MG tablet    ANTIVERT    180 tablet    Take 1 tablet (25 mg) by mouth 2 times daily    Meniere's disease, unspecified laterality       metoprolol 25 MG tablet    LOPRESSOR    180 tablet    Take 1 tablet (25 mg) by mouth 2 times daily    Essential hypertension with goal blood pressure less than 140/90       STATIN NOT PRESCRIBED (INTENTIONAL)      by Other route continuous prn.    Hyperlipidemia LDL goal <130       TYLENOL 325 MG tablet   Generic drug:  acetaminophen      Take 1-2 tablets by mouth every 6 hours as needed. As needed

## 2018-01-08 NOTE — PROGRESS NOTES
AUDIOLOGY REPORT    BACKGROUND INFORMATION: Dr. Luis Dillon implanted Negin Magallon with a right  Nucleus  cochlear implant 08/2/2010 but had device failure, was explanted and reimplanted on 06/05/2011 with the same  device, as the recall was in September 2011.  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  She is being seen 1/8/2018 in Audiology at the [facility] for follow up programming with 910 and 810 processors.   Melissa Hinds, Clinical Specialist from [facility] was at the appointment. She was last seen 11/20/2017 with Ms Lizet as well, and since got a replacement for her 910 processor.  She was seen 10/17/2016 for upgrading processors.     PATIENT REPORT: She got the replacement 910 processor in November 2017.  She feels the background noise takes more time to reduce than with her 810 processor.  She feels she hears well one on one well, but does not hear well with background or groups.  She continues to have difficulty hearing with water running and when frying.   The 910 are too loud. She has been remapped several times, but had not liked the sound.  She also does not like the Wind and SCAN.      PROBLEM LISTS AND TRIALS:  1.  First implant:  After a few months-November 2010, she noted a loud sound and tinnitus with and without the implant, and she felt she had problems with perception and consistency of sound since then. She had frequent complaints that clanging plates sounded uncomfortable and intermittency of sound. She had a device failure documented in May 2011.  2.  Second implant:  She was initially programmed 6/14/2011.    7/5/11- One week later, she noted interruptions of sound.  Normal electrode  "impedances.  8/2/11 Notes intermittency and can no longer hear birds.  Sera De Jesus from [facility] came for investigation and assistance in programming.  Low frequency electrode impedances are reducing.  8/15/11 Seems to hear better after programming.  Electrodes 15-22 impedances are low.  9/13/11 Notes a humming sound, decreased performance, and intermittency.  Good speech perception results for 3 months review  1/31/12 Notes intermittency and dizziness  2/20/12 Notes a humming sound, intermittency, and feels her benefit has diminished.  She also notes a numbness in her head.  ENT evaluation was recommended.  Improving speech perception results for 6 month review  5/21/12 Feels the implant is too loud, hissing sound, and not clear.  Reprogrammed and she left liking the sound.  Trials with frequency tables, with no consistent perception  6/5/12 She dislikes the new program, and notes a screeching sound and continued intermittency.  Electrode 1-3 disabled.  She notes a feeling in her neck for electrode 1 when doing NRT  7/2/2012 Programming seemed to resolve much of the problems  2/13/2017 She continues to complain that speech is not clear enough, and the instruments shut down in noise.  The noise and wind circuit were disabled.    Previously tried:  Turning off electrodes 15-22, but she hated the sound.  Enabling electrodes 15-22 and reprogramming and trials with frequency tables.  She preferred table 21.  6/5/2017 As she continues to complain that it shuts down, the Autosensitivity (ACS) was changed to Less 60 as the default was 55.  6/19/2017 It does not \"shut down\" but speech is no longer clear.  She liked her previous mapping better so they were installed.  11/7/2017:  Both processors sound bad, and she has not worn the new processor.  She remembers the best program was with Ms Hinds, so that was reinstalled for both the 810 and 910.  She reported they both sounded better.  11/20/2017:  After reinstalling " "the old program she still did not like the sound.  Things done at this visity. 1. Increased Cs 3 units, and she did not like the sound;  2.  Increased Ts 3 units and she felt it sounded better, but 6 was too much; 3. The response was tilted with the lows higher and she did not like it; 4. Electrodes 2 and 3 were disabled and she did not like it; 5. The Frequency Allocation Table was changed to 188 to 6938 and she felt it was much better; 6. A different 910 processor was tried and she felt it sounded better than hers so it will be exchanged.  1/8/2018 She reports that with water running or frying, speech gets quieter, but the examiner and Ms Lizet recalls, it was the water sound had reduced.  Today because of her reports of it being too loud, the 910 Cs were reduced 2 clinical units.  Also, the second program in the 910 was changed to 57 (it was Less (60) to be less aggressive).  At the end of all the testing she reported noticing a \"popping\" sound with the processor in the evening about 2 times a week.      FITTING SESSION: Dr. Luis Dillon, cochlear implant surgeon, ordered today's appointment. The patient came to the clinic for adjustment to the programs in the external speech processor and for assessment of the external components of the cochlear implant system. These components provide power and data to the internal device. Sound is only heard once the external portion is activated. Postoperative treatment, including device fitting and adjustment, audiologic assessments, and training are required at regular intervals.  Programming included psychophysical measures of comfort, threshold, and loudness balance, and adjustments of frequency tables and electrodes.  Processor type: 910 and 810  Headpiece type: Standard   Magnet strength: 2    TEST RESULTS:    Electrode Impedances: Stable  Neural Response Testing: DNT  Facial Stimulation: Absent  Tinnitus: Present  Balance Problems: Occasional from the Menieres " "disease, and she fell because of it.  She is in physical therapy  Pain/Discomfort: None  Strategies Tried:     Programs 910 Processor: Her program 49 was decreased 2 clinical units from 47 with volume 5 and sensitivity 10  1. 49 HOME Adro, ASC \"Less 60 to be less aggressive\" No noise  2. 49  HOME Adro, Autosensitivy; No noise  3. 49 GROUP (noise);Zoom, Autosensitivity; Adro, Noise  4. 49 CAFE (focus)  Beam; Autosensitivity; Adro; Noise  Number of Channels per Program: 2-22 enabled.    Programs: 810 processor (volume 5; sensitivity 10)  1. 48 new map, with standard FAT 21 ([phone]); Everyday (Adro ) 60 ASC  2. 48 new map, with standard FAT 21 ([phone]); Everyday (Adro with Autosensitivity) 60 ASC  3. 48 Noise  Zoom, Autosensitivity; Adro; 57 ASC  4. 48 Focus Beam, Autosensitivity, Adro; 57 ASC    COMMENTS:  Patient felt they sounded good at the end of the appointment and we are optimistic it will be better.  She also, added at the very end that she has heard a popping sound in the evening with the processors (however, it was a slightly confusing report).  She was counseled about the limitations of background noise, and she is not interested in bilateral implants.    SUMMARY AND RECOMMENDATIONS: Ms Magallon was seen for follow-up programming with her 910 and 810 processors. Adjustments were made with the support of Ms Hinds and the patient seemed pleased. If the popping events increase, she will return for an integrity test.   Questions were answered.    Betzy Sevilla, CCC-A  Licensed Audiologist  [place] #[id number]    "

## 2018-02-05 DIAGNOSIS — R80.9 PROTEINURIA, UNSPECIFIED TYPE: ICD-10-CM

## 2018-02-05 LAB
CREAT UR-MCNC: 44 MG/DL
MICROALBUMIN UR-MCNC: 7 MG/L
MICROALBUMIN/CREAT UR: 15.02 MG/G CR (ref 0–25)

## 2018-02-05 PROCEDURE — 82043 UR ALBUMIN QUANTITATIVE: CPT | Performed by: FAMILY MEDICINE

## 2018-02-06 NOTE — PROGRESS NOTES
Dear Negin Magallon,     Your recent urine test for proteins in urine looks GREAT !   Take your BP medications regularly and drink enough water.     Ida Reveles MD.   Family Physician.  [facility].

## 2018-02-07 DIAGNOSIS — M81.0 OSTEOPOROSIS, UNSPECIFIED OSTEOPOROSIS TYPE, UNSPECIFIED PATHOLOGICAL FRACTURE PRESENCE: ICD-10-CM

## 2018-02-07 NOTE — TELEPHONE ENCOUNTER
"Requested Prescriptions   Pending Prescriptions Disp Refills     alendronate (FOSAMAX) 70 MG tablet [Pharmacy Med Name: ALENDRONATE 70MG TABLETS] 4 tablet 0    Last Written Prescription Date:  11/1/17  Last Fill Quantity: 4,  # refills: 3   Last Office Visit with [facility] provider:  11/1/17   Future Office Visit:      Sig: TAKE 1 TABLET BY MOUTH EVERY 7 DAYS TAKE 60 MINUTES BEFORE AM MEAL WITH 8 OUNCES OF WATER REMAIN UPRIGHT FOR 30 MINUTES    Bisphosphonates Passed    2/7/2018  1:20 PM       Passed - Recent or future visit with authorizing provider's specialty    Patient had office visit in the last year or has a visit in the next 30 days with authorizing provider.  See \"Patient Info\" tab in inbasket, or \"Choose Columns\" in Meds & Orders section of the refill encounter.            Passed - Dexa on file within past 2 years    Please review last Dexa result.          Passed - Patient is age 18 or older       Passed - Normal Serum Creatinine on file within past 12 months    Recent Labs   Lab Test  11/01/17   1154   CR  0.86               "

## 2018-02-10 RX ORDER — ALENDRONATE SODIUM 70 MG/1
TABLET ORAL
Qty: 4 TABLET | Refills: 0 | Status: SHIPPED | OUTPATIENT
Start: 2018-02-10 | End: 2018-04-25

## 2018-04-13 ENCOUNTER — OFFICE VISIT (OUTPATIENT)
Dept: FAMILY MEDICINE | Facility: CLINIC | Age: 78
End: 2018-04-13
Payer: COMMERCIAL

## 2018-04-13 VITALS
TEMPERATURE: 96.6 F | DIASTOLIC BLOOD PRESSURE: 71 MMHG | OXYGEN SATURATION: 95 % | SYSTOLIC BLOOD PRESSURE: 157 MMHG | WEIGHT: 163 LBS | BODY MASS INDEX: 27.98 KG/M2 | HEART RATE: 61 BPM

## 2018-04-13 DIAGNOSIS — R05.9 COUGH: Primary | ICD-10-CM

## 2018-04-13 DIAGNOSIS — R09.81 NASAL CONGESTION: ICD-10-CM

## 2018-04-13 PROCEDURE — 99213 OFFICE O/P EST LOW 20 MIN: CPT | Performed by: FAMILY MEDICINE

## 2018-04-13 RX ORDER — AZITHROMYCIN 250 MG/1
TABLET, FILM COATED ORAL
Qty: 6 TABLET | Refills: 0 | Status: SHIPPED | OUTPATIENT
Start: 2018-04-13 | End: 2018-04-25

## 2018-04-13 ASSESSMENT — PAIN SCALES - GENERAL: PAINLEVEL: NO PAIN (0)

## 2018-04-13 NOTE — PROGRESS NOTES
SUBJECTIVE:   Negin Magallon is a 78 year old female who presents to clinic today for the following health issues:    ENT Symptoms             Symptoms: cc Present Absent Comment   Fever/Chills   x    Fatigue   x    Muscle Aches   x    Eye Irritation   x    Sneezing  x     Nasal Guillermo/Drg  x     Sinus Pressure/Pain   x    Loss of smell   x    Dental pain   x    Sore Throat   x    Swollen Glands   x    Ear Pain/Fullness   x    Cough  x     Wheeze  x     Chest Pain   x    Shortness of breath   x    Rash   x    Other   x      Symptom duration:  2 weeks   Symptom severity:  Intermittent   Treatments tried:  Cough drops   Contacts:  None     Pallavi Bernabe MA    Problem list and histories reviewed & adjusted, as indicated.  Additional history: as documented    Patient Active Problem List   Diagnosis     Hypothyroidism     Meniere's disease     Cochlear implant in place     Hyperlipidemia LDL goal <130     Advanced directives, counseling/discussion     Hypertension goal BP (blood pressure) < 140/90     DJD (degenerative joint disease)     Gout     Shoulder impingement     Adhesive capsulitis     Past Surgical History:   Procedure Laterality Date     ABDOMEN SURGERY       COLONOSCOPY       ENT SURGERY  9/2010     R cochlear implant     EYE SURGERY      L eye muscle     GI SURGERY       HEMORRHOID SURGERY       HYSTERECTOMY, CERVIX STATUS UNKNOWN       IMPLANT REVISION COCHLEA  5/26/2011    Procedure:IMPLANT REVISION COCHLEA;  Remove and replace nucleus cochlear implant right.; Surgeon:BRITTANEY STAUFFER; Location: OR       Social History   Substance Use Topics     Smoking status: Former Smoker     Packs/day: 1.00     Years: 50.00     Types: Cigarettes     Start date: 3/14/1954     Quit date: 3/14/2004     Smokeless tobacco: Never Used     Alcohol use No      Comment: rarely,socially/1/2 months     Family History   Problem Relation Age of Onset     DIABETES Father          Current Outpatient Prescriptions    Medication Sig Dispense Refill     azithromycin (ZITHROMAX) 250 MG tablet Two tablets first day, then one tablet daily for four days. 6 tablet 0     metoprolol (LOPRESSOR) 25 MG tablet Take 1 tablet (25 mg) by mouth 2 times daily 180 tablet 1     losartan (COZAAR) 25 MG tablet TAKE 1 TABLET(25 MG) BY MOUTH DAILY 90 tablet 1     levothyroxine (SYNTHROID/LEVOTHROID) 50 MCG tablet Take 1 tablet (50 mcg) by mouth daily 90 tablet 3     meclizine (ANTIVERT) 25 MG tablet Take 1 tablet (25 mg) by mouth 2 times daily 180 tablet 1     STATIN NOT PRESCRIBED, INTENTIONAL, by Other route continuous prn.  0     acetaminophen (TYLENOL) 325 MG tablet Take 1-2 tablets by mouth every 6 hours as needed. As needed       CALCIUM + D OR petite 400/500 2 daily       alendronate (FOSAMAX) 70 MG tablet TAKE 1 TABLET BY MOUTH EVERY 7 DAYS TAKE 60 MINUTES BEFORE AM MEAL WITH 8 OUNCES OF WATER REMAIN UPRIGHT FOR 30 MINUTES (Patient not taking: Reported on 4/13/2018) 4 tablet 0     Allergies   Allergen Reactions     Amoxicillin Swelling     tongue     Atorvastatin Calcium      Muscle aches.  Has also tried muvacor, provachol, lopid, and crestor with same results     Lisinopril Cough     Prevacid [Aspartame] Diarrhea     Advil [Ibuprofen] Rash     Prednisone Swelling and Rash     Medrol dose pack     Recent Labs   Lab Test  11/01/17   1154  08/16/17   1220  03/08/17   1303  04/15/16   0916  03/18/16   1223  03/16/15   1613   10/19/12   0837   05/14/12   0904   09/27/11   1511   03/10/11   0835   A1C   --    --    --    --    --    --    --   5.7   --   6.0   --    --    --   5.7   LDL   --    --    --    --    --    --    --   212*   --    --    --   188*   --    --    HDL   --    --    --    --    --    --    --   41*   --    --    --   40*   --    --    TRIG   --    --    --    --    --    --    --   310*   --    --    --   355*   --   400*   ALT   --    --   25   --   15  21   --    --    --    --    --    --    --    --    CR  0.86  0.97   0.96  0.98  1.08*  0.78   < >   --    < >  0.97   --   0.90   --   1.15*   GFRESTIMATED  64  56*  57*  55*  50*  72   < >   --    < >  57*   --   62   --   47*   GFRESTBLACK  77  67  68  67  60*  88   < >   --    < >  69   --   75   --   57*   POTASSIUM  4.1  5.2  4.9  4.4  4.2  4.1   < >   --    < >  4.3   --   4.3   < >  4.4   TSH   --    --   2.32  1.66  1.61  1.02   < >   --    --    --    < >   --    --   2.68    < > = values in this interval not displayed.      BP Readings from Last 3 Encounters:   04/13/18 157/71   11/01/17 157/74   08/16/17 153/62    Wt Readings from Last 3 Encounters:   04/13/18 163 lb (73.9 kg)   11/01/17 160 lb (72.6 kg)   08/16/17 159 lb (72.1 kg)                  Labs reviewed in EPIC    Reviewed and updated as needed this visit by clinical staff       Reviewed and updated as needed this visit by Provider         ROS:  Constitutional, HEENT, cardiovascular, pulmonary, gi and gu systems are negative, except as otherwise noted.    OBJECTIVE:     /71 (BP Location: Right arm, Patient Position: Sitting, Cuff Size: Adult Regular)  Pulse 61  Temp 96.6  F (35.9  C) (Oral)  Wt 163 lb (73.9 kg)  SpO2 95%  BMI 27.98 kg/m2  Body mass index is 27.98 kg/(m^2).  GENERAL: healthy, alert and no distress  HENT: nose : nasal congestion + and mouth without ulcers or lesions  Mild tenderness on the maxillary sinuses.   NECK: no adenopathy  RESP: lungs clear to auscultation - no rales, rhonchi or wheezes  CV: regular rate and rhythm, normal S1 S2, no S3 or S4, no murmur    ASSESSMENT/PLAN:       ICD-10-CM    1. Cough R05 azithromycin (ZITHROMAX) 250 MG tablet   2. Nasal congestion R09.81        Ida Reveles MD  [facility]

## 2018-04-13 NOTE — MR AVS SNAPSHOT
After Visit Summary   4/13/2018    Negin Magallon    MRN: [id number]           Patient Information     Date Of Birth          1940        Visit Information        Provider Department      4/13/2018 11:20 AM Ida Reveles MD [facility]        Today's Diagnoses     Cough    -  1    Nasal congestion           Follow-ups after your visit        Your next 10 appointments already scheduled     Apr 25, 2018 11:00 AM CDT   Addie Sue with Ida Reveles MD   [facility]    [address] [place] [id number] [phone]              Who to contact     If you have questions or need follow up information about today's clinic visit or your schedule please contact [facility] directly at [phone].  Normal or non-critical lab and imaging results will be communicated to you by MyChart, letter or phone within 4 business days after the clinic has received the results. If you do not hear from us within 7 days, please contact the clinic through [employer] or phone. If you have a critical or abnormal lab result, we will notify you by phone as soon as possible.  Submit refill requests through [employer] or call your pharmacy and they will forward the refill request to us. Please allow 3 business days for your refill to be completed.          Additional Information About Your Visit        MyChart Information     [employer] gives you secure access to your electronic health record. If you see a primary care provider, you can also send messages to your care team and make appointments. If you have questions, please call your primary care clinic.  If you do not have a primary care provider, please call [phone] and they will assist you.        Care EveryWhere ID     This is your Care EveryWhere ID. This could be used by other organizations to access your [facility]  medical records  [id number]        Your Vitals Were     Pulse Temperature Pulse Oximetry BMI (Body Mass Index)          61 96.6  F (35.9  C) (Oral) 95% 27.98 kg/m2         Blood Pressure from Last 3 Encounters:   04/13/18 157/71   11/01/17 157/74   08/16/17 153/62    Weight from Last 3 Encounters:   04/13/18 163 lb (73.9 kg)   11/01/17 160 lb (72.6 kg)   08/16/17 159 lb (72.1 kg)              Today, you had the following     No orders found for display         Today's Medication Changes          These changes are accurate as of 4/13/18 12:25 PM.  If you have any questions, ask your nurse or doctor.               Start taking these medicines.        Dose/Directions    azithromycin 250 MG tablet   Commonly known as:  ZITHROMAX   Used for:  Cough   Started by:  Ida Reveles MD        Two tablets first day, then one tablet daily for four days.   Quantity:  6 tablet   Refills:  0            Where to get your medicines      These medications were sent to [id number] - [place], [place] - [address] AT [facility] [address]  [address], [place] [address]     Phone:  [phone]     azithromycin 250 MG tablet                Primary Care Provider Office Phone # Fax #    Ida Reveles [phone]       [address] [facility] [address]        Equal Access to Services     [facility] CenterCHRISTIAN AH: Hadii anahy ku hadasho Somary, waaxda luqadaha, qaybta kaalmada adeegyada, waxay gavin borges. So [facility] [phone].    ATENCIÓN: Si habla español, tiene a john disposición servicios gratuitos de asistencia lingüística. Llame al [phone].    We comply with applicable federal civil rights laws and [place] laws. We do not discriminate on the basis of race, color, national origin, age, disability, sex, sexual orientation, or gender identity.            Thank you!     Thank you for choosing [facility]  [facility]  for your care. Our goal is always to provide you with excellent care. Hearing back from our patients is one way we can continue to improve our services. Please take a few minutes to complete the written survey that you may receive in the mail after your visit with us. Thank you!             Your Updated Medication List - Protect others around you: Learn how to safely use, store and throw away your medicines at www.disposemymeds.org.          This list is accurate as of 4/13/18 12:25 PM.  Always use your most recent med list.                   Brand Name Dispense Instructions for use Diagnosis    alendronate 70 MG tablet    FOSAMAX    4 tablet    TAKE 1 TABLET BY MOUTH EVERY 7 DAYS TAKE 60 MINUTES BEFORE AM MEAL WITH 8 OUNCES OF WATER REMAIN UPRIGHT FOR 30 MINUTES    Osteoporosis, unspecified osteoporosis type, unspecified pathological fracture presence       azithromycin 250 MG tablet    ZITHROMAX    6 tablet    Two tablets first day, then one tablet daily for four days.    Cough       CALCIUM + D PO      petite 400/500 2 daily        levothyroxine 50 MCG tablet    SYNTHROID/LEVOTHROID    90 tablet    Take 1 tablet (50 mcg) by mouth daily    Hypothyroidism, unspecified type       losartan 25 MG tablet    COZAAR    90 tablet    TAKE 1 TABLET(25 MG) BY MOUTH DAILY    Hypertension goal BP (blood pressure) < 140/90       meclizine 25 MG tablet    ANTIVERT    180 tablet    Take 1 tablet (25 mg) by mouth 2 times daily    Meniere's disease, unspecified laterality       metoprolol tartrate 25 MG tablet    LOPRESSOR    180 tablet    Take 1 tablet (25 mg) by mouth 2 times daily    Essential hypertension with goal blood pressure less than 140/90       STATIN NOT PRESCRIBED (INTENTIONAL)      by Other route continuous prn.    Hyperlipidemia LDL goal <130       TYLENOL 325 MG tablet   Generic drug:  acetaminophen      Take 1-2 tablets by mouth every 6 hours as needed. As needed

## 2018-04-25 ENCOUNTER — OFFICE VISIT (OUTPATIENT)
Dept: FAMILY MEDICINE | Facility: CLINIC | Age: 78
End: 2018-04-25
Payer: COMMERCIAL

## 2018-04-25 VITALS
HEART RATE: 56 BPM | WEIGHT: 165 LBS | HEIGHT: 64 IN | TEMPERATURE: 97 F | BODY MASS INDEX: 28.17 KG/M2 | SYSTOLIC BLOOD PRESSURE: 148 MMHG | DIASTOLIC BLOOD PRESSURE: 68 MMHG

## 2018-04-25 DIAGNOSIS — I10 ESSENTIAL HYPERTENSION WITH GOAL BLOOD PRESSURE LESS THAN 140/90: ICD-10-CM

## 2018-04-25 DIAGNOSIS — I10 HYPERTENSION GOAL BP (BLOOD PRESSURE) < 140/90: Primary | ICD-10-CM

## 2018-04-25 DIAGNOSIS — H81.09 MENIERE'S DISEASE, UNSPECIFIED LATERALITY: ICD-10-CM

## 2018-04-25 DIAGNOSIS — E03.8 OTHER SPECIFIED HYPOTHYROIDISM: ICD-10-CM

## 2018-04-25 PROCEDURE — 36415 COLL VENOUS BLD VENIPUNCTURE: CPT | Performed by: FAMILY MEDICINE

## 2018-04-25 PROCEDURE — 84443 ASSAY THYROID STIM HORMONE: CPT | Performed by: FAMILY MEDICINE

## 2018-04-25 PROCEDURE — 99214 OFFICE O/P EST MOD 30 MIN: CPT | Performed by: FAMILY MEDICINE

## 2018-04-25 RX ORDER — MECLIZINE HYDROCHLORIDE 25 MG/1
25 TABLET ORAL
Qty: 60 TABLET | Refills: 0 | Status: SHIPPED | OUTPATIENT
Start: 2018-04-25 | End: 2021-04-29

## 2018-04-25 RX ORDER — LOSARTAN POTASSIUM 25 MG/1
TABLET ORAL
Qty: 90 TABLET | Refills: 1 | Status: SHIPPED | OUTPATIENT
Start: 2018-04-25 | End: 2018-10-02

## 2018-04-25 RX ORDER — METOPROLOL TARTRATE 25 MG/1
25 TABLET, FILM COATED ORAL 2 TIMES DAILY
Qty: 180 TABLET | Refills: 1 | Status: SHIPPED | OUTPATIENT
Start: 2018-04-25 | End: 2018-10-02

## 2018-04-25 NOTE — PROGRESS NOTES
SUBJECTIVE:   Negin Magallon is a 78 year old female who presents to clinic today for the following health issues:    Hypertension Follow-up :     Outpatient blood pressures are being checked at home.  Results are always less than 140/90s.     Low Salt Diet: low salt    Amount of exercise or physical activity: 6-7 days/week for an average of 30-45 minutes    Problems taking medications regularly: No    Medication side effects: muscle aches from Fosamax    Diet: low salt    Hypothyroidism Follow-up    Since last visit, patient describes the following symptoms: Weight stable, no hair loss, no skin changes, no constipation, no loose stools    Meclizine: does not need it that often, just once in a while. Helps with symptoms.     Problem list and histories reviewed & adjusted, as indicated.  Additional history: as documented    Patient Active Problem List   Diagnosis     Hypothyroidism     Meniere's disease     Cochlear implant in place     Hyperlipidemia LDL goal <130     Advanced directives, counseling/discussion     Hypertension goal BP (blood pressure) < 140/90     DJD (degenerative joint disease)     Gout     Shoulder impingement     Adhesive capsulitis     Past Surgical History:   Procedure Laterality Date     ABDOMEN SURGERY       COLONOSCOPY       ENT SURGERY  9/2010     R cochlear implant     EYE SURGERY      L eye muscle     GI SURGERY       HEMORRHOID SURGERY       HYSTERECTOMY, CERVIX STATUS UNKNOWN       IMPLANT REVISION COCHLEA  5/26/2011    Procedure:IMPLANT REVISION COCHLEA;  Remove and replace nucleus cochlear implant right.; Surgeon:BRITTANEY STAUFFER; Location: OR       Social History   Substance Use Topics     Smoking status: Former Smoker     Packs/day: 1.00     Years: 50.00     Types: Cigarettes     Start date: 3/14/1954     Quit date: 3/14/2004     Smokeless tobacco: Never Used     Alcohol use No      Comment: rarely,socially/1/2 months     Family History   Problem Relation Age of Onset      DIABETES Father          Current Outpatient Prescriptions   Medication Sig Dispense Refill     acetaminophen (TYLENOL) 325 MG tablet Take 1-2 tablets by mouth every 6 hours as needed. As needed       CALCIUM + D OR petite 400/500 2 daily       levothyroxine (SYNTHROID/LEVOTHROID) 50 MCG tablet Take 1 tablet (50 mcg) by mouth daily 90 tablet 3     losartan (COZAAR) 25 MG tablet TAKE 1 TABLET(25 MG) BY MOUTH DAILY 90 tablet 1     meclizine (ANTIVERT) 25 MG tablet Take 1 tablet (25 mg) by mouth 2 times daily 60 tablet 0     metoprolol tartrate (LOPRESSOR) 25 MG tablet Take 1 tablet (25 mg) by mouth 2 times daily 180 tablet 1     STATIN NOT PRESCRIBED, INTENTIONAL, by Other route continuous prn.  0     [DISCONTINUED] losartan (COZAAR) 25 MG tablet TAKE 1 TABLET(25 MG) BY MOUTH DAILY 90 tablet 1     [DISCONTINUED] metoprolol (LOPRESSOR) 25 MG tablet Take 1 tablet (25 mg) by mouth 2 times daily 180 tablet 1     Allergies   Allergen Reactions     Amoxicillin Swelling     tongue     Atorvastatin Calcium      Muscle aches.  Has also tried muvacor, provachol, lopid, and crestor with same results     Lisinopril Cough     Prevacid [Aspartame] Diarrhea     Advil [Ibuprofen] Rash     Prednisone Swelling and Rash     Medrol dose pack     Recent Labs   Lab Test  11/01/17   1154  08/16/17   1220  03/08/17   1303  04/15/16   0916  03/18/16   1223  03/16/15   1613   10/19/12   0837   05/14/12   0904   09/27/11   1511   03/10/11   0835   A1C   --    --    --    --    --    --    --   5.7   --   6.0   --    --    --   5.7   LDL   --    --    --    --    --    --    --   212*   --    --    --   188*   --    --    HDL   --    --    --    --    --    --    --   41*   --    --    --   40*   --    --    TRIG   --    --    --    --    --    --    --   310*   --    --    --   355*   --   400*   ALT   --    --   25   --   15  21   --    --    --    --    --    --    --    --    CR  0.86  0.97  0.96  0.98  1.08*  0.78   < >   --    < >  0.97    "--   0.90   --   1.15*   GFRESTIMATED  64  56*  57*  55*  50*  72   < >   --    < >  57*   --   62   --   47*   GFRESTBLACK  77  67  68  67  60*  88   < >   --    < >  69   --   75   --   57*   POTASSIUM  4.1  5.2  4.9  4.4  4.2  4.1   < >   --    < >  4.3   --   4.3   < >  4.4   TSH   --    --   2.32  1.66  1.61  1.02   < >   --    --    --    < >   --    --   2.68    < > = values in this interval not displayed.      BP Readings from Last 3 Encounters:   04/25/18 148/68   04/13/18 157/71   11/01/17 157/74    Wt Readings from Last 3 Encounters:   04/25/18 165 lb (74.8 kg)   04/13/18 163 lb (73.9 kg)   11/01/17 160 lb (72.6 kg)                  Labs reviewed in EPIC    Reviewed and updated as needed this visit by clinical staff  Tobacco  Allergies  Meds  Med Hx  Surg Hx  Fam Hx  Soc Hx      Reviewed and updated as needed this visit by Provider         ROS:  Constitutional, HEENT, cardiovascular, pulmonary, gi and gu systems are negative, except as otherwise noted.    OBJECTIVE:     /68 (BP Location: Left arm, Patient Position: Sitting, Cuff Size: Adult Regular)  Pulse 56  Temp 97  F (36.1  C) (Oral)  Ht 5' 3.5\" (1.613 m)  Wt 165 lb (74.8 kg)  BMI 28.77 kg/m2  Body mass index is 28.77 kg/(m^2).  GENERAL: healthy, alert and no distress  Neck: no LAP, thyroid normal on palpation.   RESP: lungs clear to auscultation - no rales, rhonchi or wheezes  CV: regular rate and rhythm, normal S1 S2, no S3 or S4   MS: no gross musculoskeletal defects noted, no edema    ASSESSMENT/PLAN:         ICD-10-CM    1. Hypertension goal BP (blood pressure) < 140/90 I10 losartan (COZAAR) 25 MG tablet   2. Other specified hypothyroidism E03.8 TSH WITH FREE T4 REFLEX   3. Essential hypertension with goal blood pressure less than 140/90 I10 metoprolol tartrate (LOPRESSOR) 25 MG tablet    borderline normal, continue same medication.    4. Meniere's disease, unspecified laterality H81.09 meclizine (ANTIVERT) 25 MG tablet     BP " less than 150/90. Continue with same medications. Encouraged regular exercise as tolerated.   Awaiting Thyroid labs.   See provider in Oct- Nov for CPE and BP follow up.      Ida Reveles MD  [facility]

## 2018-04-25 NOTE — MR AVS SNAPSHOT
After Visit Summary   4/25/2018    Negin Magallon    MRN: [id number]           Patient Information     Date Of Birth          1940        Visit Information        Provider Department      4/25/2018 11:00 AM Ida Reveles MD [facility]        Today's Diagnoses     Hypertension goal BP (blood pressure) < 140/90    -  1    Other specified hypothyroidism        Essential hypertension with goal blood pressure less than 140/90        Meniere's disease, unspecified laterality           Follow-ups after your visit        Who to contact     If you have questions or need follow up information about today's clinic visit or your schedule please contact [facility] directly at [phone].  Normal or non-critical lab and imaging results will be communicated to you by MyChart, letter or phone within 4 business days after the clinic has received the results. If you do not hear from us within 7 days, please contact the clinic through [employer] or phone. If you have a critical or abnormal lab result, we will notify you by phone as soon as possible.  Submit refill requests through [employer] or call your pharmacy and they will forward the refill request to us. Please allow 3 business days for your refill to be completed.          Additional Information About Your Visit        MyChart Information     ReplySend gives you secure access to your electronic health record. If you see a primary care provider, you can also send messages to your care team and make appointments. If you have questions, please call your primary care clinic.  If you do not have a primary care provider, please call [phone] and they will assist you.        Care EveryWhere ID     This is your Care EveryWhere ID. This could be used by other organizations to access your [place] medical records  [id number]        Your Vitals Were     Pulse Temperature Height BMI (Body Mass Index)           "60 97  F (36.1  C) (Oral) 5' 3.5\" (1.613 m) 28.77 kg/m2         Blood Pressure from Last 3 Encounters:   04/25/18 159/69   04/13/18 157/71   11/01/17 157/74    Weight from Last 3 Encounters:   04/25/18 165 lb (74.8 kg)   04/13/18 163 lb (73.9 kg)   11/01/17 160 lb (72.6 kg)              We Performed the Following     TSH WITH FREE T4 REFLEX          Where to get your medicines      These medications were sent to [id number] - [place], [place] - [address] AT [facility] [address]  [address], [place] [address]     Phone:  [phone]     losartan 25 MG tablet    meclizine 25 MG tablet    metoprolol tartrate 25 MG tablet          Primary Care Provider Office Phone # Fax #    Ida Reveles [phone]       [address] [place], [place]. \[facility].\"" [id number]        Equal Access to Services     [facility] ELSA : Hadii anahy ku hadasho Soomaali, waaxda luqadaha, qaybta kaalmada adetacoyamarques, saw borges. So [facility] [phone].    ATENCIÓN: Si habla español, tiene a john disposición servicios gratuitos de asistencia lingüística. Yordy al [phone].    We comply with applicable federal civil rights laws and [place] laws. We do not discriminate on the basis of race, color, national origin, age, disability, sex, sexual orientation, or gender identity.            Thank you!     Thank you for choosing [facility]  for your care. Our goal is always to provide you with excellent care. Hearing back from our patients is one way we can continue to improve our services. Please take a few minutes to complete the written survey that you may receive in the mail after your visit with us. Thank you!             Your Updated Medication List - Protect others around you: Learn how to safely use, store and throw away your medicines at www.disposemymeds.org.          This list is accurate as of 4/25/18 12:09 PM.  Always " use your most recent med list.                   Brand Name Dispense Instructions for use Diagnosis    CALCIUM + D PO      petite 400/500 2 daily        levothyroxine 50 MCG tablet    SYNTHROID/LEVOTHROID    90 tablet    Take 1 tablet (50 mcg) by mouth daily    Hypothyroidism, unspecified type       losartan 25 MG tablet    COZAAR    90 tablet    TAKE 1 TABLET(25 MG) BY MOUTH DAILY    Hypertension goal BP (blood pressure) < 140/90       meclizine 25 MG tablet    ANTIVERT    60 tablet    Take 1 tablet (25 mg) by mouth 2 times daily    Meniere's disease, unspecified laterality       metoprolol tartrate 25 MG tablet    LOPRESSOR    180 tablet    Take 1 tablet (25 mg) by mouth 2 times daily    Essential hypertension with goal blood pressure less than 140/90       STATIN NOT PRESCRIBED (INTENTIONAL)      by Other route continuous prn.    Hyperlipidemia LDL goal <130       TYLENOL 325 MG tablet   Generic drug:  acetaminophen      Take 1-2 tablets by mouth every 6 hours as needed. As needed

## 2018-04-26 DIAGNOSIS — E03.9 HYPOTHYROIDISM, UNSPECIFIED TYPE: ICD-10-CM

## 2018-04-26 LAB — TSH SERPL DL<=0.005 MIU/L-ACNC: 1.51 MU/L (ref 0.4–4)

## 2018-04-26 RX ORDER — LEVOTHYROXINE SODIUM 50 UG/1
50 TABLET ORAL DAILY
Qty: 90 TABLET | Refills: 3 | Status: SHIPPED | OUTPATIENT
Start: 2018-04-26 | End: 2018-10-02

## 2018-04-26 NOTE — PROGRESS NOTES
Dear Negin Magallon,     Your recent TSH ( thyroid lab) looks good. I have sent prescription to your pharmacy with refills. Recheck thyroid lab in 1 year.     Enjoy the Spring!     Ida Reveles MD.   Family Physician.  [facility].

## 2018-09-30 PROBLEM — E03.4 HYPOTHYROIDISM DUE TO ACQUIRED ATROPHY OF THYROID: Status: ACTIVE | Noted: 2018-09-30

## 2018-10-02 ENCOUNTER — OFFICE VISIT (OUTPATIENT)
Dept: FAMILY MEDICINE | Facility: CLINIC | Age: 78
End: 2018-10-02
Payer: COMMERCIAL

## 2018-10-02 VITALS
SYSTOLIC BLOOD PRESSURE: 180 MMHG | BODY MASS INDEX: 28.6 KG/M2 | DIASTOLIC BLOOD PRESSURE: 79 MMHG | OXYGEN SATURATION: 99 % | TEMPERATURE: 97 F | WEIGHT: 164 LBS | HEART RATE: 65 BPM

## 2018-10-02 DIAGNOSIS — E03.9 HYPOTHYROIDISM, UNSPECIFIED TYPE: ICD-10-CM

## 2018-10-02 DIAGNOSIS — I10 HYPERTENSION GOAL BP (BLOOD PRESSURE) < 140/90: Primary | ICD-10-CM

## 2018-10-02 DIAGNOSIS — I10 ESSENTIAL HYPERTENSION WITH GOAL BLOOD PRESSURE LESS THAN 140/90: ICD-10-CM

## 2018-10-02 DIAGNOSIS — E78.5 HYPERLIPIDEMIA LDL GOAL <130: ICD-10-CM

## 2018-10-02 DIAGNOSIS — E03.4 HYPOTHYROIDISM DUE TO ACQUIRED ATROPHY OF THYROID: ICD-10-CM

## 2018-10-02 DIAGNOSIS — R73.09 OTHER ABNORMAL GLUCOSE: ICD-10-CM

## 2018-10-02 DIAGNOSIS — Z23 NEED FOR PROPHYLACTIC VACCINATION AND INOCULATION AGAINST INFLUENZA: ICD-10-CM

## 2018-10-02 DIAGNOSIS — Z96.21 COCHLEAR IMPLANT IN PLACE: ICD-10-CM

## 2018-10-02 DIAGNOSIS — H81.09 MENIERE'S DISEASE, UNSPECIFIED LATERALITY: ICD-10-CM

## 2018-10-02 PROCEDURE — G0008 ADMIN INFLUENZA VIRUS VAC: HCPCS | Performed by: INTERNAL MEDICINE

## 2018-10-02 PROCEDURE — 99214 OFFICE O/P EST MOD 30 MIN: CPT | Mod: 25 | Performed by: INTERNAL MEDICINE

## 2018-10-02 PROCEDURE — 90662 IIV NO PRSV INCREASED AG IM: CPT | Performed by: INTERNAL MEDICINE

## 2018-10-02 RX ORDER — LOSARTAN POTASSIUM 25 MG/1
TABLET ORAL
Qty: 90 TABLET | Refills: 3 | Status: SHIPPED | OUTPATIENT
Start: 2018-10-02 | End: 2019-04-02

## 2018-10-02 RX ORDER — LEVOTHYROXINE SODIUM 50 UG/1
50 TABLET ORAL DAILY
Qty: 90 TABLET | Refills: 3 | Status: SHIPPED | OUTPATIENT
Start: 2018-10-02 | End: 2019-04-02

## 2018-10-02 RX ORDER — METOPROLOL TARTRATE 25 MG/1
25 TABLET, FILM COATED ORAL 2 TIMES DAILY
Qty: 180 TABLET | Refills: 3 | Status: SHIPPED | OUTPATIENT
Start: 2018-10-02 | End: 2019-04-02

## 2018-10-02 NOTE — MR AVS SNAPSHOT
After Visit Summary   10/2/2018    Negin Magallon    MRN: [id number]           Patient Information     Date Of Birth          1940        Visit Information        Provider Department      10/2/2018 11:00 AM Hazel Young MD [facility]        Today's Diagnoses     Hypertension goal BP (blood pressure) < 140/90    -  1    Hypothyroidism due to acquired atrophy of thyroid        Hyperlipidemia LDL goal <130        Other abnormal glucose        Essential hypertension with goal blood pressure less than 140/90        Need for prophylactic vaccination and inoculation against influenza        Cochlear implant in place        Meniere's disease, unspecified laterality        Hypothyroidism, unspecified type          Care Instructions    Return to clinic Spring 2019 for annual exam    Keep checking BP twice weekly and bring to exam.            Follow-ups after your visit        Your next 10 appointments already scheduled     Nov 06, 2018 11:00 AM CST   PHYSICAL with Hazel Young MD   [facility] ([facility])    [address] [place] [id number] [phone]              Who to contact     If you have questions or need follow up information about today's clinic visit or your schedule please contact [facility] directly at [phone].  Normal or non-critical lab and imaging results will be communicated to you by MyChart, letter or phone within 4 business days after the clinic has received the results. If you do not hear from us within 7 days, please contact the clinic through MyChart or phone. If you have a critical or abnormal lab result, we will notify you by phone as soon as possible.  Submit refill requests through [employer] or call your pharmacy and they will forward the refill request to us. Please allow 3 business days for your refill to be completed.           Additional Information About Your Visit        [employer] Information     [employer] gives you secure access to your electronic health record. If you see a primary care provider, you can also send messages to your care team and make appointments. If you have questions, please call your primary care clinic.  If you do not have a primary care provider, please call [phone] and they will assist you.        Care EveryWhere ID     This is your Care EveryWhere ID. This could be used by other organizations to access your [place] medical records  [id number]        Your Vitals Were     Pulse Temperature Pulse Oximetry BMI (Body Mass Index)          65 97  F (36.1  C) (Oral) 99% 28.6 kg/m2         Blood Pressure from Last 3 Encounters:   10/02/18 180/79   04/25/18 148/68   04/13/18 157/71    Weight from Last 3 Encounters:   10/02/18 164 lb (74.4 kg)   04/25/18 165 lb (74.8 kg)   04/13/18 163 lb (73.9 kg)              We Performed the Following     Basic metabolic panel     FLU VACCINE, INCREASED ANTIGEN, PRESV FREE, AGE 65+ [98610]     Hemoglobin A1c     Vaccine Administration, Initial [95248]          Today's Medication Changes          These changes are accurate as of 10/2/18 11:31 AM.  If you have any questions, ask your nurse or doctor.               These medicines have changed or have updated prescriptions.        Dose/Directions    meclizine 25 MG tablet   Commonly known as:  ANTIVERT   This may have changed:    - when to take this  - reasons to take this   Used for:  Meniere's disease, unspecified laterality        Dose:  25 mg   Take 1 tablet (25 mg) by mouth 2 times daily   Quantity:  60 tablet   Refills:  0            Where to get your medicines      These medications were sent to [id number] - [place], [place] - [address] AT [facility] [address], [place] [address]     Phone:  [phone]     levothyroxine 50 MCG tablet    losartan 25 MG tablet     metoprolol tartrate 25 MG tablet                Primary Care Provider Office Phone # Fax #    Ida Bryan Reveles [phone]       [address] [facility] [address]        Equal Access to Services     [facility] HUNTER : Hadhelen medel cirilo Somary, waaxda luqadaha, qaybta kaalmada adesameer, saw gavin fernandoflavio childsevlalita borges. So [facility] [phone].    ATENCIÓN: Si habla español, tiene a john disposición servicios gratuitos de asistencia lingüística. Llame al [phone].    We comply with applicable federal civil rights laws and [place] laws. We do not discriminate on the basis of race, color, national origin, age, disability, sex, sexual orientation, or gender identity.            Thank you!     Thank you for choosing [facility]  for your care. Our goal is always to provide you with excellent care. Hearing back from our patients is one way we can continue to improve our services. Please take a few minutes to complete the written survey that you may receive in the mail after your visit with us. Thank you!             Your Updated Medication List - Protect others around you: Learn how to safely use, store and throw away your medicines at www.disposemymeds.org.          This list is accurate as of 10/2/18 11:31 AM.  Always use your most recent med list.                   Brand Name Dispense Instructions for use Diagnosis    CALCIUM + D PO      petite 400/500 2 daily        levothyroxine 50 MCG tablet    SYNTHROID/LEVOTHROID    90 tablet    Take 1 tablet (50 mcg) by mouth daily    Hypothyroidism, unspecified type       losartan 25 MG tablet    COZAAR    90 tablet    TAKE 1 TABLET(25 MG) BY MOUTH DAILY    Hypertension goal BP (blood pressure) < 140/90       meclizine 25 MG tablet    ANTIVERT    60 tablet    Take 1 tablet (25 mg) by mouth 2 times daily    Meniere's disease, unspecified laterality       metoprolol tartrate 25 MG tablet    LOPRESSOR    180  tablet    Take 1 tablet (25 mg) by mouth 2 times daily    Essential hypertension with goal blood pressure less than 140/90       STATIN NOT PRESCRIBED (INTENTIONAL)      by Other route continuous prn.    Hyperlipidemia LDL goal <130       TYLENOL 325 MG tablet   Generic drug:  acetaminophen      Take 1-2 tablets by mouth every 6 hours as needed. As needed

## 2018-10-02 NOTE — PATIENT INSTRUCTIONS
Return to clinic Spring 2019 for annual exam    Keep checking BP twice weekly and bring to exam.

## 2018-10-02 NOTE — PROGRESS NOTES
SUBJECTIVE:   Negin Magallon is a 78 year old female who presents to clinic today for the following health issues:    77 y/o pt here for a first visit.  H/o hypothyroid, Meniere's diz (prn meclazine) and cochlear implant, HTN, Gout, shoulder djd.      Hypertension Follow-up      Outpatient blood pressures are being checked at home.  Results are 118/60, 124/64, 133/63, 136/64,.  She reports the cuff is correlated and her Office BP are always much higher.      Low Salt Diet: low salt      Amount of exercise or physical activity: None    Problems taking medications regularly: No    Medication side effects: none    Diet: regular (no restrictions) and low salt      Medication Followup of all meds    Taking Medication as prescribed: yes    Side Effects:  None    Medication Helping Symptoms:  yes            Problem list and histories reviewed & adjusted, as indicated.  Additional history: as documented    Patient Active Problem List   Diagnosis     Meniere's disease     Cochlear implant in place     Hyperlipidemia LDL goal <130     Advanced directives, counseling/discussion     Hypertension goal BP (blood pressure) < 140/90     DJD (degenerative joint disease)     Gout     Shoulder impingement     Adhesive capsulitis     Hypothyroidism due to acquired atrophy of thyroid     Past Surgical History:   Procedure Laterality Date     ABDOMEN SURGERY       COLONOSCOPY       ENT SURGERY  9/2010     R cochlear implant     EYE SURGERY      L eye muscle     GI SURGERY       HEMORRHOID SURGERY       HYSTERECTOMY, CERVIX STATUS UNKNOWN       IMPLANT REVISION COCHLEA  5/26/2011    Procedure:IMPLANT REVISION COCHLEA;  Remove and replace nucleus cochlear implant right.; Surgeon:BRITTANEY STAUFFER; Location: OR       Social History   Substance Use Topics     Smoking status: Former Smoker     Packs/day: 1.00     Years: 50.00     Types: Cigarettes     Start date: 3/14/1954     Quit date: 3/14/2004     Smokeless tobacco: Never Used      Alcohol use No      Comment: rarely,socially/1/2 months     Family History   Problem Relation Age of Onset     Diabetes Father          Current Outpatient Prescriptions   Medication Sig Dispense Refill     acetaminophen (TYLENOL) 325 MG tablet Take 1-2 tablets by mouth every 6 hours as needed. As needed       CALCIUM + D OR petite 400/500 2 daily       levothyroxine (SYNTHROID/LEVOTHROID) 50 MCG tablet Take 1 tablet (50 mcg) by mouth daily 90 tablet 3     losartan (COZAAR) 25 MG tablet TAKE 1 TABLET(25 MG) BY MOUTH DAILY 90 tablet 1     meclizine (ANTIVERT) 25 MG tablet Take 1 tablet (25 mg) by mouth 2 times daily (Patient taking differently: Take 25 mg by mouth 2 times daily as needed ) 60 tablet 0     metoprolol tartrate (LOPRESSOR) 25 MG tablet Take 1 tablet (25 mg) by mouth 2 times daily 180 tablet 1     STATIN NOT PRESCRIBED, INTENTIONAL, by Other route continuous prn.  0     Allergies   Allergen Reactions     Amoxicillin Swelling     tongue     Atorvastatin Calcium      Muscle aches.  Has also tried muvacor, provachol, lopid, and crestor with same results     Lisinopril Cough     Prevacid [Aspartame] Diarrhea     Advil [Ibuprofen] Rash     Prednisone Swelling and Rash     Medrol dose pack     Recent Labs   Lab Test  04/25/18   1225  11/01/17   1154  08/16/17   1220  03/08/17   1303   03/18/16   1223  03/16/15   1613   10/19/12   0837   05/14/12   0904   09/27/11   1511   03/10/11   0835   A1C   --    --    --    --    --    --    --    --   5.7   --   6.0   --    --    --   5.7   LDL   --    --    --    --    --    --    --    --   212*   --    --    --   188*   --    --    HDL   --    --    --    --    --    --    --    --   41*   --    --    --   40*   --    --    TRIG   --    --    --    --    --    --    --    --   310*   --    --    --   355*   --   400*   ALT   --    --    --   25   --   15  21   --    --    --    --    --    --    --    --    CR   --   0.86  0.97  0.96   < >  1.08*  0.78   < >   --    <  >  0.97   --   0.90   --   1.15*   GFRESTIMATED   --   64  56*  57*   < >  50*  72   < >   --    < >  57*   --   62   --   47*   GFRESTBLACK   --   77  67  68   < >  60*  88   < >   --    < >  69   --   75   --   57*   POTASSIUM   --   4.1  5.2  4.9   < >  4.2  4.1   < >   --    < >  4.3   --   4.3   < >  4.4   TSH  1.51   --    --   2.32   < >  1.61  1.02   < >   --    --    --    < >   --    --   2.68    < > = values in this interval not displayed.      BP Readings from Last 3 Encounters:   10/02/18 180/79   04/25/18 148/68   04/13/18 157/71    Wt Readings from Last 3 Encounters:   10/02/18 164 lb (74.4 kg)   04/25/18 165 lb (74.8 kg)   04/13/18 163 lb (73.9 kg)            Labs reviewed in EPIC    Reviewed and updated as needed this visit by clinical staff  Tobacco  Allergies  Meds  Med Hx  Surg Hx  Fam Hx  Soc Hx      Reviewed and updated as needed this visit by Provider       ROS:  Constitutional, HEENT, cardiovascular, pulmonary, gi and gu systems are negative, except as otherwise noted.  Using cane for balance, no falls.      OBJECTIVE:     /79 (BP Location: Right arm, Patient Position: Sitting, Cuff Size: Adult Regular)  Pulse 65  Temp 97  F (36.1  C) (Oral)  Wt 164 lb (74.4 kg)  SpO2 99%  BMI 28.6 kg/m2  Body mass index is 28.6 kg/(m^2).  GENERAL: healthy, alert and no distress  EYES: Eyes grossly normal to inspection, PERRL and conjunctivae and sclerae normal  EARS:  coclear implant, right ear.    NECK: no adenopathy, no asymmetry, masses, or scars and thyroid normal to palpation  RESP: lungs clear to auscultation - no rales, rhonchi or wheezes  CV: regular rate and rhythm, normal S1 S2, no S3 or S4, no murmur, click or rub, no peripheral edema and peripheral pulses strong  MS: no gross musculoskeletal defects noted, no edema  SKIN: no suspicious lesions or rashes  NEURO: Normal strength and tone, mentation intact and speech normal  PSYCH: mentation appears normal, affect  normal/bright    Diagnostic Test Results:  Results for orders placed or performed in visit on 04/25/18   TSH WITH FREE T4 REFLEX   Result Value Ref Range    TSH 1.51 0.40 - 4.00 mU/L       ASSESSMENT/PLAN:          ICD-10-CM    1. Hypertension goal BP (blood pressure) < 140/90 I10 losartan (COZAAR) 25 MG tablet     Basic metabolic panel  (Ca, Cl, CO2, Creat, Gluc, K, Na, BUN)     CANCELED: Basic metabolic panel   2. Hypothyroidism due to acquired atrophy of thyroid E03.4    3. Hyperlipidemia LDL goal <130 E78.5    4. Other abnormal glucose R73.09 Hemoglobin A1c     CANCELED: Hemoglobin A1c   5. Essential hypertension with goal blood pressure less than 140/90 I10 metoprolol tartrate (LOPRESSOR) 25 MG tablet    borderline normal, continue same medication.    6. Need for prophylactic vaccination and inoculation against influenza Z23 FLU VACCINE, INCREASED ANTIGEN, PRESV FREE, AGE 65+ [37054]     Vaccine Administration, Initial [72290]   7. Cochlear implant in place Z96.21    8. Meniere's disease, unspecified laterality H81.09    9. Hypothyroidism, unspecified type E03.9 levothyroxine (SYNTHROID/LEVOTHROID) 50 MCG tablet        declines shingrix and FLP   Has whitecoat HTN.       Patient Instructions   Return to clinic Spring 2019 for annual exam    Keep checking BP twice weekly and bring to exam.       Hazel Young MD  [facility]      Injectable Influenza Immunization Documentation    1.  Is the person to be vaccinated sick today?   No    2. Does the person to be vaccinated have an allergy to a component   of the vaccine?   No  Egg Allergy Algorithm Link    3. Has the person to be vaccinated ever had a serious reaction   to influenza vaccine in the past?   No    4. Has the person to be vaccinated ever had Guillain-Barré syndrome?   No    Form completed by Alejandra Arita ma

## 2018-11-07 ENCOUNTER — DOCUMENTATION ONLY (OUTPATIENT)
Dept: OTHER | Facility: CLINIC | Age: 78
End: 2018-11-07

## 2018-11-15 ENCOUNTER — DOCUMENTATION ONLY (OUTPATIENT)
Dept: OTHER | Facility: CLINIC | Age: 78
End: 2018-11-15

## 2019-04-02 ENCOUNTER — OFFICE VISIT (OUTPATIENT)
Dept: FAMILY MEDICINE | Facility: CLINIC | Age: 79
End: 2019-04-02
Payer: MEDICARE

## 2019-04-02 VITALS
HEIGHT: 64 IN | WEIGHT: 164 LBS | OXYGEN SATURATION: 97 % | BODY MASS INDEX: 28 KG/M2 | TEMPERATURE: 97.8 F | HEART RATE: 60 BPM | SYSTOLIC BLOOD PRESSURE: 189 MMHG | DIASTOLIC BLOOD PRESSURE: 80 MMHG

## 2019-04-02 DIAGNOSIS — E03.4 HYPOTHYROIDISM DUE TO ACQUIRED ATROPHY OF THYROID: ICD-10-CM

## 2019-04-02 DIAGNOSIS — I10 ESSENTIAL HYPERTENSION WITH GOAL BLOOD PRESSURE LESS THAN 140/90: ICD-10-CM

## 2019-04-02 DIAGNOSIS — Z00.00 ROUTINE GENERAL MEDICAL EXAMINATION AT A HEALTH CARE FACILITY: Primary | ICD-10-CM

## 2019-04-02 DIAGNOSIS — M81.0 SENILE OSTEOPOROSIS: ICD-10-CM

## 2019-04-02 DIAGNOSIS — Z96.21 COCHLEAR IMPLANT IN PLACE: ICD-10-CM

## 2019-04-02 DIAGNOSIS — M15.0 PRIMARY OSTEOARTHRITIS INVOLVING MULTIPLE JOINTS: ICD-10-CM

## 2019-04-02 DIAGNOSIS — R73.09 ELEVATED GLUCOSE LEVEL: ICD-10-CM

## 2019-04-02 DIAGNOSIS — I10 HYPERTENSION GOAL BP (BLOOD PRESSURE) < 140/90: ICD-10-CM

## 2019-04-02 DIAGNOSIS — E03.9 HYPOTHYROIDISM, UNSPECIFIED TYPE: ICD-10-CM

## 2019-04-02 LAB
ANION GAP SERPL CALCULATED.3IONS-SCNC: 7 MMOL/L (ref 3–14)
BUN SERPL-MCNC: 15 MG/DL (ref 7–30)
CALCIUM SERPL-MCNC: 9.1 MG/DL (ref 8.5–10.1)
CHLORIDE SERPL-SCNC: 102 MMOL/L (ref 94–109)
CO2 SERPL-SCNC: 29 MMOL/L (ref 20–32)
CREAT SERPL-MCNC: 0.93 MG/DL (ref 0.52–1.04)
GFR SERPL CREATININE-BSD FRML MDRD: 59 ML/MIN/{1.73_M2}
GLUCOSE SERPL-MCNC: 117 MG/DL (ref 70–99)
HBA1C MFR BLD: 6 % (ref 0–5.6)
POTASSIUM SERPL-SCNC: 4.4 MMOL/L (ref 3.4–5.3)
SODIUM SERPL-SCNC: 138 MMOL/L (ref 133–144)
TSH SERPL DL<=0.005 MIU/L-ACNC: 2.82 MU/L (ref 0.4–4)

## 2019-04-02 PROCEDURE — 82306 VITAMIN D 25 HYDROXY: CPT | Performed by: INTERNAL MEDICINE

## 2019-04-02 PROCEDURE — 99212 OFFICE O/P EST SF 10 MIN: CPT | Mod: 25 | Performed by: INTERNAL MEDICINE

## 2019-04-02 PROCEDURE — 83036 HEMOGLOBIN GLYCOSYLATED A1C: CPT | Performed by: INTERNAL MEDICINE

## 2019-04-02 PROCEDURE — 36415 COLL VENOUS BLD VENIPUNCTURE: CPT | Performed by: INTERNAL MEDICINE

## 2019-04-02 PROCEDURE — 84443 ASSAY THYROID STIM HORMONE: CPT | Performed by: INTERNAL MEDICINE

## 2019-04-02 PROCEDURE — G0439 PPPS, SUBSEQ VISIT: HCPCS | Performed by: INTERNAL MEDICINE

## 2019-04-02 PROCEDURE — 80048 BASIC METABOLIC PNL TOTAL CA: CPT | Performed by: INTERNAL MEDICINE

## 2019-04-02 RX ORDER — LEVOTHYROXINE SODIUM 50 UG/1
50 TABLET ORAL DAILY
Qty: 90 TABLET | Refills: 3 | Status: SHIPPED | OUTPATIENT
Start: 2019-04-02 | End: 2020-05-26

## 2019-04-02 RX ORDER — LOSARTAN POTASSIUM 25 MG/1
TABLET ORAL
Qty: 90 TABLET | Refills: 3 | Status: SHIPPED | OUTPATIENT
Start: 2019-04-02 | End: 2019-10-17 | Stop reason: ALTCHOICE

## 2019-04-02 RX ORDER — METOPROLOL TARTRATE 25 MG/1
25 TABLET, FILM COATED ORAL 2 TIMES DAILY
Qty: 180 TABLET | Refills: 3 | Status: SHIPPED | OUTPATIENT
Start: 2019-04-02 | End: 2020-05-26

## 2019-04-02 ASSESSMENT — ENCOUNTER SYMPTOMS
JOINT SWELLING: 1
ARTHRALGIAS: 1
HEARTBURN: 1

## 2019-04-02 ASSESSMENT — ACTIVITIES OF DAILY LIVING (ADL)
CURRENT_FUNCTION: TRANSPORTATION REQUIRES ASSISTANCE
CURRENT_FUNCTION: HOUSEWORK REQUIRES ASSISTANCE

## 2019-04-02 ASSESSMENT — MIFFLIN-ST. JEOR: SCORE: 1205.96

## 2019-04-02 NOTE — PATIENT INSTRUCTIONS
See RN (VALERIE) for a quick BP check, bring your cuff to correlate    You can take the metoprolol together with the losartan in the mornings, you can take the second metoprolol at supper      Preventive Health Recommendations    See your health care provider every year to    Review health changes.     Discuss preventive care.      Review your medicines if your doctor has prescribed any.      You no longer need a yearly Pap test unless you've had an abnormal Pap test in the past 10 years. If you have vaginal symptoms, such as bleeding or discharge, be sure to talk with your provider about a Pap test.      Every 1 to 2 years, have a mammogram.  If you are over 69, talk with your health care provider about whether or not you want to continue having screening mammograms.      Every 10 years, have a colonoscopy. Or, have a yearly FIT test (stool test). These exams will check for colon cancer.       Have a cholesterol test every 5 years, or more often if your doctor advises it.       Have a diabetes test (fasting glucose) every three years. If you are at risk for diabetes, you should have this test more often.       At age 65, have a bone density scan (DEXA) to check for osteoporosis (brittle bone disease).    Shots:    Get a flu shot each year.    Get a tetanus shot every 10 years.    Talk to your doctor about your pneumonia vaccines. There are now two you should receive - Pneumovax (PPSV 23) and Prevnar (PCV 13).    Talk to your pharmacist about the shingles vaccine.    Talk to your doctor about the hepatitis B vaccine.    Nutrition:     Eat at least 5 servings of fruits and vegetables each day.      Eat whole-grain bread, whole-wheat pasta and brown rice instead of white grains and rice.      Get adequate Calcium and Vitamin D.     Lifestyle    Exercise at least 150 minutes a week (30 minutes a day, 5 days a week). This will help you control your weight and prevent disease.      Limit alcohol to one drink per  day.      No smoking.       Wear sunscreen to prevent skin cancer.       See your dentist twice a year for an exam and cleaning.      See your eye doctor every 1 to 2 years to screen for conditions such as glaucoma, macular degeneration and cataracts.    Personalized Prevention Plan  You are due for the preventive services outlined below.  Your care team is available to assist you in scheduling these services.  If you have already completed any of these items, please share that information with your care team to update in your medical record.  Health Maintenance Due   Topic Date Due     Diptheria Tetanus Pertussis (DTAP/TDAP/TD) Vaccine (1 - Tdap) 02/15/1967     Zoster (Shingles) Vaccine (1 of 2) 02/15/1992     FALL RISK ASSESSMENT  03/08/2018     Basic Metabolic Lab - yearly  11/01/2018     Annual Wellness Visit  11/01/2018     Depression Assessment 2 - yearly  11/01/2018     Thyroid Function Lab (TSH) - yearly  04/25/2019

## 2019-04-02 NOTE — PROGRESS NOTES
"SUBJECTIVE:   Negin Magallon is a 79 year old female who presents for Preventive Visit.    80 y/o here for AFE.  H/o hypothyroid, Meniere's diz (prn meclazine) and cochlear implant, HTN, Gout, shoulder djd.  Declined TDAP and declines Shingrix?    declines FLP      .      Are you in the first 12 months of your Medicare coverage?  No    Annual Wellness Visit     In general, how would you rate your overall health?  Good    Frequency of exercise:  None    Do you usually eat at least 4 servings of fruit and vegetables a day, include whole grains    & fiber and avoid regularly eating high fat or \"junk\" foods?  Yes    Taking medications regularly:  No    Medication side effects:  Muscle aches and Lightheadedness    Ability to successfully perform activities of daily living:  Transportation requires assistance and housework requires assistance    Home Safety:  No safety concerns identified    Hearing Impairment:  Difficulty following a conversation in a noisy restaurant or crowded room, difficult to understand a speaker at a public meeting or Orthodox service and difficulty understanding speech on the telephone    In the past 6 months, have you been bothered by leaking of urine? Yes    In general, how would you rate your overall mental or emotional health?  Good    PHQ-2 Total Score: 0    Additional concerns today:  No    Do you feel safe in your environment? Yes    Do you have a Health Care Directive? Yes: Advance Directive has been received and scanned.      Fall risk       Cognitive Screening   1) Repeat 3 items (Leader, Season, Table)    2) Clock draw: NORMAL  3) 3 item recall: Recalls 3 objects  Results: 3 items recalled: COGNITIVE IMPAIRMENT LESS LIKELY    Mini-CogTM Copyright NAKUL Rocha. Licensed by the author for use in [facility]; reprinted with permission (agustina@.[facility]). All rights reserved.      Do you have sleep apnea, excessive snoring or daytime drowsiness?: no    Reviewed and updated as " needed this visit by clinical staff         Reviewed and updated as needed this visit by Provider        Social History     Tobacco Use     Smoking status: Former Smoker     Packs/day: 1.00     Years: 50.00     Pack years: 50.00     Types: Cigarettes     Start date: 3/14/1954     Last attempt to quit: 3/14/2004     Years since quitting: 15.0     Smokeless tobacco: Never Used   Substance Use Topics     Alcohol use: No     Comment: rarely,socially/1/2 months       Alcohol Use 4/2/2019   If you drink alcohol do you typically have greater than 3 drinks per day OR greater than 7 drinks per week? No           Discuss BP, Joint stiffness     Current providers sharing in care for this patient include:   Patient Care Team:  Hazel Young MD as Assigned PCP    The following health maintenance items are reviewed in Epic and correct as of today:  Health Maintenance   Topic Date Due     DTAP/TDAP/TD IMMUNIZATION (1 - Tdap) 02/15/1967     ZOSTER IMMUNIZATION (1 of 2) 02/15/1992     FALL RISK ASSESSMENT  03/08/2018     BMP Q1 YR  11/01/2018     MEDICARE ANNUAL WELLNESS VISIT  11/01/2018     PHQ-2 Q1 YR  11/01/2018     TSH Q1 YEAR  04/25/2019     LIPID SCREEN Q5 YR FEMALE (SYSTEM ASSIGNED)  10/02/2023     ADVANCE DIRECTIVE PLANNING Q5 YRS  11/15/2023     DEXA SCAN SCREENING (SYSTEM ASSIGNED)  Completed     INFLUENZA VACCINE  Completed     IPV IMMUNIZATION  Aged Out     MENINGITIS IMMUNIZATION  Aged Out     Labs reviewed in EPIC  BP Readings from Last 3 Encounters:   04/02/19 189/80   10/02/18 180/79   04/25/18 148/68    Wt Readings from Last 3 Encounters:   04/02/19 74.4 kg (164 lb)   10/02/18 74.4 kg (164 lb)   04/25/18 74.8 kg (165 lb)                  Patient Active Problem List   Diagnosis     Meniere's disease     Cochlear implant in place     Hyperlipidemia LDL goal <130     Hypertension goal BP (blood pressure) < 140/90     DJD (degenerative joint disease)     Gout     Shoulder impingement     Adhesive capsulitis      Hypothyroidism due to acquired atrophy of thyroid     Past Surgical History:   Procedure Laterality Date     ABDOMEN SURGERY       COLONOSCOPY       ENT SURGERY  9/2010     R cochlear implant     EYE SURGERY      L eye muscle     GI SURGERY       HEMORRHOID SURGERY       HYSTERECTOMY, CERVIX STATUS UNKNOWN       IMPLANT REVISION COCHLEA  5/26/2011    Procedure:IMPLANT REVISION COCHLEA;  Remove and replace nucleus cochlear implant right.; Surgeon:BRITTANEY STAUFFER; Location: OR       Social History     Tobacco Use     Smoking status: Former Smoker     Packs/day: 1.00     Years: 50.00     Pack years: 50.00     Types: Cigarettes     Start date: 3/14/1954     Last attempt to quit: 3/14/2004     Years since quitting: 15.0     Smokeless tobacco: Never Used   Substance Use Topics     Alcohol use: No     Comment: rarely,socially/1/2 months     Family History   Problem Relation Age of Onset     Diabetes Father          Current Outpatient Medications   Medication Sig Dispense Refill     acetaminophen (TYLENOL) 325 MG tablet Take 1-2 tablets by mouth every 6 hours as needed. As needed       CALCIUM + D OR petite 400/500 2 daily       levothyroxine (SYNTHROID/LEVOTHROID) 50 MCG tablet Take 1 tablet (50 mcg) by mouth daily 90 tablet 3     losartan (COZAAR) 25 MG tablet TAKE 1 TABLET(25 MG) BY MOUTH DAILY 90 tablet 3     meclizine (ANTIVERT) 25 MG tablet Take 1 tablet (25 mg) by mouth 2 times daily (Patient taking differently: Take 25 mg by mouth 2 times daily as needed ) 60 tablet 0     metoprolol tartrate (LOPRESSOR) 25 MG tablet Take 1 tablet (25 mg) by mouth 2 times daily 180 tablet 3     STATIN NOT PRESCRIBED, INTENTIONAL, by Other route continuous prn.  0     Allergies   Allergen Reactions     Amoxicillin Swelling     tongue     Atorvastatin Calcium      Muscle aches.  Has also tried muvacor, provachol, lopid, and crestor with same results     Lisinopril Cough     Prevacid [Aspartame] Diarrhea     Advil [Ibuprofen] Rash  "    Prednisone Swelling and Rash     Medrol dose pack     Recent Labs   Lab Test 04/25/18  1225 11/01/17  1154 08/16/17  1220 03/08/17  1303  03/18/16  1223 03/16/15  1613  10/19/12  0837  05/14/12  0904  09/27/11  1511  03/10/11  0835   A1C  --   --   --   --   --   --   --   --  5.7  --  6.0  --   --   --  5.7   LDL  --   --   --   --   --   --   --   --  212*  --   --   --  188*  --   --    HDL  --   --   --   --   --   --   --   --  41*  --   --   --  40*  --   --    TRIG  --   --   --   --   --   --   --   --  310*  --   --   --  355*  --  400*   ALT  --   --   --  25  --  15 21  --   --   --   --   --   --   --   --    CR  --  0.86 0.97 0.96   < > 1.08* 0.78   < >  --    < > 0.97  --  0.90  --  1.15*   GFRESTIMATED  --  64 56* 57*   < > 50* 72   < >  --    < > 57*  --  62  --  47*   GFRESTBLACK  --  77 67 68   < > 60* 88   < >  --    < > 69  --  75  --  57*   POTASSIUM  --  4.1 5.2 4.9   < > 4.2 4.1   < >  --    < > 4.3  --  4.3   < > 4.4   TSH 1.51  --   --  2.32   < > 1.61 1.02   < >  --   --   --    < >  --   --  2.68    < > = values in this interval not displayed.           Review of Systems   HENT: Positive for hearing loss.    Gastrointestinal: Positive for heartburn.   Musculoskeletal: Positive for arthralgias and joint swelling.     Constitutional, HEENT, cardiovascular, pulmonary, GI, , musculoskeletal, neuro, skin, endocrine and psych systems are negative, except as otherwise noted.  Joint stiffness in am, in different areas  Some GERD, able to manage with diet/avoidance.     OBJECTIVE:   /80 (BP Location: Right arm, Patient Position: Sitting, Cuff Size: Adult Large)   Pulse 60   Temp 97.8  F (36.6  C) (Oral)   Ht 1.613 m (5' 3.5\")   Wt 74.4 kg (164 lb)   SpO2 97%   BMI 28.60 kg/m   Estimated body mass index is 28.6 kg/m  as calculated from the following:    Height as of 4/25/18: 1.613 m (5' 3.5\").    Weight as of 10/2/18: 74.4 kg (164 lb).     Physical Exam  GENERAL APPEARANCE: " healthy, alert and no distress  EYES: Eyes grossly normal to inspection, PERRL and conjunctivae and sclerae normal  HENT: ear canals and TM's normal, nose and mouth without ulcers or lesions, oropharynx clear and oral mucous membranes moist  NECK: no adenopathy, no asymmetry, masses, or scars and thyroid normal to palpation  RESP: lungs clear to auscultation - no rales, rhonchi or wheezes  BREAST: normal without masses, tenderness or nipple discharge and no palpable axillary masses or adenopathy  CV: regular rate and rhythm, normal S1 S2, no S3 or S4, no murmur, click or rub, no peripheral edema and peripheral pulses strong  ABDOMEN: soft, nontender, no hepatosplenomegaly, no masses and bowel sounds normal   (female): normal female external genitalia, normal urethral meatus, vaginal mucosal atrophy noted, normal cervix, adnexae, and uterus without masses or abnormal discharge  Bimanual only  MS: no musculoskeletal defects are noted and gait is age appropriate without ataxia  SKIN: no suspicious lesions or rashes  NEURO: Normal strength and tone, sensory exam grossly normal, mentation intact and speech normal  PSYCH: mentation appears normal and affect normal/bright    Diagnostic Test Results:  Results for orders placed or performed in visit on 04/25/18   TSH WITH FREE T4 REFLEX   Result Value Ref Range    TSH 1.51 0.40 - 4.00 mU/L     See today's order.     ASSESSMENT / PLAN:       ICD-10-CM    1. Routine general medical examination at a health care facility Z00.00    2. Hypertension goal BP (blood pressure) < 140/90 I10 Basic metabolic panel   3. Primary osteoarthritis involving multiple joints M15.0    4. Cochlear implant in place Z96.21    5. Hypothyroidism due to acquired atrophy of thyroid E03.4 TSH with free T4 reflex   6. Elevated glucose level R73.09 Hemoglobin A1c   7. Senile osteoporosis M81.0 Vitamin D Deficiency       BP is elevated.   She is normal at home (per diary 130's /80's) and has had her cuff  "correlated.       she should take her losartan in am instead of supper    End of Life Planning:  Patient currently has an advanced directive: Yes.  Practitioner is supportive of decision.    COUNSELING:  Reviewed preventive health counseling, as reflected in patient instructions       Regular exercise    BP Readings from Last 1 Encounters:   10/02/18 180/79     Estimated body mass index is 28.6 kg/m  as calculated from the following:    Height as of 4/25/18: 1.613 m (5' 3.5\").    Weight as of 10/2/18: 74.4 kg (164 lb).      Weight management plan: work on not gaining     reports that she quit smoking about 15 years ago. Her smoking use included cigarettes. She started smoking about 65 years ago. She has a 50.00 pack-year smoking history. she has never used smokeless tobacco.      Appropriate preventive services were discussed with this patient, including applicable screening as appropriate for cardiovascular disease, diabetes, osteopenia/osteoporosis, and glaucoma.  As appropriate for age/gender, discussed screening for colorectal cancer, prostate cancer, breast cancer, and cervical cancer. Checklist reviewing preventive services available has been given to the patient.    Reviewed patients plan of care and provided an AVS. The Basic Care Plan (routine screening as documented in Health Maintenance) for Negin meets the Care Plan requirement. This Care Plan has been established and reviewed with the Patient.    Counseling Resources:  ATP IV Guidelines  Pooled Cohorts Equation Calculator  Breast Cancer Risk Calculator  FRAX Risk Assessment  ICSI Preventive Guidelines  Dietary Guidelines for Americans, 2010  USDA's MyPlate  ASA Prophylaxis  Lung CA Screening    Hazel Young MD  [facility]  "

## 2019-04-02 NOTE — LETTER
[facility]  [address] [place]  [address]  [phone]        April 4, 2019    Negin Magallon  [address] [id number]        Dear Negin,    Enclosed are your results.  Your labs are normal/stable at this time.  The HgbA1C is slightly elevated, implying pre diabetes.  Keeping weight stable or some mild weight loss will reduce your chances of developing diabetes in the future.    Please call  with any questions.  We can also discuss any questions regarding these labs at your next scheduled visit.    Results for orders placed or performed in visit on 04/02/19   Basic metabolic panel   Result Value Ref Range    Sodium 138 133 - 144 mmol/L    Potassium 4.4 3.4 - 5.3 mmol/L    Chloride 102 94 - 109 mmol/L    Carbon Dioxide 29 20 - 32 mmol/L    Anion Gap 7 3 - 14 mmol/L    Glucose 117 (H) 70 - 99 mg/dL    Urea Nitrogen 15 7 - 30 mg/dL    Creatinine 0.93 0.52 - 1.04 mg/dL    GFR Estimate 59 (L) >60 mL/min/[1.73_m2]    GFR Estimate If Black 69 >60 mL/min/[1.73_m2]    Calcium 9.1 8.5 - 10.1 mg/dL   TSH with free T4 reflex   Result Value Ref Range    TSH 2.82 0.40 - 4.00 mU/L   Hemoglobin A1c   Result Value Ref Range    Hemoglobin A1C 6.0 (H) 0 - 5.6 %   Vitamin D Deficiency   Result Value Ref Range    Vitamin D Deficiency screening 31 20 - 75 ug/L       If you have any questions please call the clinic at [phone].    Sincerely,    Hazel MAHAJAN

## 2019-04-03 LAB — DEPRECATED CALCIDIOL+CALCIFEROL SERPL-MC: 31 UG/L (ref 20–75)

## 2019-04-04 NOTE — RESULT ENCOUNTER NOTE
Negin Magallon    Enclosed are your results.  Your labs are normal/stable at this time.  The HgbA1C is slightly elevated, implying pre diabetes.  Keeping weight stable or some mild weight loss will reduce your chances of developing diabetes in the future.    Please call  with any questions.  We can also discuss any questions regarding these labs at your next scheduled visit.    Sincerely,    Hazel Young M.D.  Send note

## 2019-04-23 ENCOUNTER — ALLIED HEALTH/NURSE VISIT (OUTPATIENT)
Dept: NURSING | Facility: CLINIC | Age: 79
End: 2019-04-23
Payer: COMMERCIAL

## 2019-04-23 VITALS — SYSTOLIC BLOOD PRESSURE: 142 MMHG | HEART RATE: 56 BPM | DIASTOLIC BLOOD PRESSURE: 70 MMHG

## 2019-04-23 DIAGNOSIS — I10 HYPERTENSION GOAL BP (BLOOD PRESSURE) < 140/90: Primary | ICD-10-CM

## 2019-04-23 PROCEDURE — 99207 ZZC NO CHARGE NURSE ONLY: CPT

## 2019-04-23 NOTE — Clinical Note
Dr. Young, see RN BP check visit today, BP much improved here, not sure if home cuff is accurate, appears to be reading high.   Also:  patient also complains of an itchy moist red raised rash under both breasts that started the day after she was seen in the clinic (4/3/19).   She denies pain unless she scratches too much.  Worse on right, she has been trying to keep it dry and using blow dryer after bathing.   Tried hydrocortisone, over the counter vaginal yeast treatment cream, and an old tube of kenalog her  had, nothing helped.   Advised her on other measures to keep area dry.Routed to [facility] to address.  Patient prefers a MyChart response if possible so provider will need to initiate that (patient with cochlear implant so hard to hear on the phone).  [facility] is [facility].Thanks! Sabrina (I don't think I can start a MyChart and route to you, would only go to the patient)Lisa Cisse, [facility]

## 2019-04-23 NOTE — PROGRESS NOTES
Indications for Blood Pressure monitoring: elevated at last office visit, did not change meds but did change schedule, now taking her losartan in the morning along with her first dose of metoprolol    Patient checks her BP at home, usually 2-3 times a day, readings are in her machine:  152/68, 157/68, 158/85, 158/81, 176/75, 171/82, 133/67, 134/66, 150/69, 140/68.   She says if it is high she waits and checks again once or twice and it usually comes down but has been consistently higher lately.   Compared to manual and clinic tower readings today, her home cuff is reading 15-30 points high on the systolic reading, diastolic is pretty close.       Questioned patient about current smoking habits.  Pt. quit smoking some time ago.     Signs and Symptoms:   Headaches: No  Chest pain: No  Shortness of breath: No  Edema: No  Visual problems: No  Parathesia: No  Epitaxis: No  Dizziness: Yes , occasional, has Meneire's, nothing new          BP:   BP Readings from Last 1 Encounters:   04/23/19 142/70     56     Diabetic: No  Heart Disease:  No    Routed to [facility] to address; patient also complains of an itchy moist red raised rash under both breasts that started the day after she was seen in the clinic (4/3/19).   She denies pain unless she scratches too much.    Worse on right, she has been trying to keep it dry and using blow dryer after bathing.   Tried hydrocortisone, over the counter vaginal yeast treatment cream, and an old tube of kenalog her  had, nothing helped.   Advised her on other measures to keep area dry.    Routed to [facility] to address.  Patient prefers a MyChart response if possible so provider will need to initiate that (patient with cochlear implant so hard to hear on the phone).  Pharmacy is [facility].    Lisa Cisse RN  [facility]

## 2019-05-16 ENCOUNTER — OFFICE VISIT (OUTPATIENT)
Dept: FAMILY MEDICINE | Facility: CLINIC | Age: 79
End: 2019-05-16
Payer: MEDICARE

## 2019-05-16 VITALS
TEMPERATURE: 97.2 F | BODY MASS INDEX: 28.6 KG/M2 | WEIGHT: 164 LBS | SYSTOLIC BLOOD PRESSURE: 138 MMHG | OXYGEN SATURATION: 98 % | HEART RATE: 60 BPM | DIASTOLIC BLOOD PRESSURE: 72 MMHG

## 2019-05-16 DIAGNOSIS — I10 HYPERTENSION GOAL BP (BLOOD PRESSURE) < 140/90: ICD-10-CM

## 2019-05-16 DIAGNOSIS — R21 RASH AND NONSPECIFIC SKIN ERUPTION: Primary | ICD-10-CM

## 2019-05-16 DIAGNOSIS — B37.89 CANDIDIASIS OF BREAST: ICD-10-CM

## 2019-05-16 PROCEDURE — 99214 OFFICE O/P EST MOD 30 MIN: CPT | Performed by: NURSE PRACTITIONER

## 2019-05-16 RX ORDER — NYSTATIN 100000 [USP'U]/G
POWDER TOPICAL 2 TIMES DAILY
Qty: 60 G | Refills: 1 | Status: SHIPPED | OUTPATIENT
Start: 2019-05-16 | End: 2021-04-29

## 2019-05-16 RX ORDER — CLOTRIMAZOLE 1 %
CREAM (GRAM) TOPICAL 2 TIMES DAILY
Qty: 60 G | Refills: 1 | Status: SHIPPED | OUTPATIENT
Start: 2019-05-16 | End: 2019-12-20

## 2019-05-16 RX ORDER — TRIAMCINOLONE ACETONIDE 1 MG/G
CREAM TOPICAL
Qty: 80 G | Refills: 0 | Status: SHIPPED | OUTPATIENT
Start: 2019-05-16 | End: 2021-04-29

## 2019-05-16 ASSESSMENT — PAIN SCALES - GENERAL: PAINLEVEL: NO PAIN (0)

## 2019-05-16 NOTE — PROGRESS NOTES
SUBJECTIVE:   Negin Magallon is a 79 year old female who presents to clinic today for the following   health issues:      Rash  Onset: 4/4/19    Description:   Location: chest, back and groin area  Character: blotchy, raised, red  Itching (Pruritis): YES    Progression of Symptoms:  worsening    Accompanying Signs & Symptoms:  Fever: no   Body aches or joint pain: no   Sore throat symptoms: no   Recent cold symptoms: no     History:   Previous similar rash: no     Precipitating factors:   Exposure to similar rash: no   New exposures: None   Recent travel: no     Alleviating factors:  nothing    Therapies Tried and outcome: Benadryl tab helps with the itch and desodine for diaper rash    Hx HTN  BP elevated at recent annual exam  She reports checking BP before coming today 129/66  She is on losartan and metoprolol  Normally follows with Dr Young    She developed rash under bilateral breasts 1 month ago  Applied Desitin which helps  Has not been using recently  Rash spread to chest and back  Very itchy  Taking Benadryl at nighttime which is helpful        Additional history: as documented    Reviewed  and updated as needed this visit by clinical staff  Tobacco  Allergies  Meds  Med Hx  Surg Hx  Fam Hx  Soc Hx        Reviewed and updated as needed this visit by Provider         Patient Active Problem List   Diagnosis     Meniere's disease     Cochlear implant in place     Hyperlipidemia LDL goal <130     Hypertension goal BP (blood pressure) < 140/90     DJD (degenerative joint disease)     Gout     Shoulder impingement     Adhesive capsulitis     Hypothyroidism due to acquired atrophy of thyroid     Past Surgical History:   Procedure Laterality Date     ABDOMEN SURGERY       COLONOSCOPY       ENT SURGERY  9/2010     R cochlear implant     EYE SURGERY      L eye muscle     GI SURGERY       HEMORRHOID SURGERY       HYSTERECTOMY, CERVIX STATUS UNKNOWN       IMPLANT REVISION COCHLEA  5/26/2011     Procedure:IMPLANT REVISION COCHLEA;  Remove and replace nucleus cochlear implant right.; Surgeon:BRITTANEY STAUFFER; [facility]       Social History     Tobacco Use     Smoking status: Former Smoker     Packs/day: 1.00     Years: 50.00     Pack years: 50.00     Types: Cigarettes     Start date: 3/14/1954     Last attempt to quit: 3/14/2004     Years since quitting: 15.1     Smokeless tobacco: Never Used   Substance Use Topics     Alcohol use: No     Comment: rarely,socially/1/2 months     Family History   Problem Relation Age of Onset     Diabetes Father          BP Readings from Last 3 Encounters:   05/16/19 138/72   04/23/19 142/70   04/02/19 189/80    Wt Readings from Last 3 Encounters:   05/16/19 74.4 kg (164 lb)   04/02/19 74.4 kg (164 lb)   10/02/18 74.4 kg (164 lb)                    ROS:  Constitutional, HEENT, cardiovascular, pulmonary, gi and gu systems are negative, except as otherwise noted.    OBJECTIVE:     /72   Pulse 60   Temp 97.2  F (36.2  C) (Oral)   Wt 74.4 kg (164 lb)   SpO2 98%   Breastfeeding? No   BMI 28.60 kg/m    Body mass index is 28.6 kg/m .  GENERAL: healthy, alert and no distress  RESP: lungs clear to auscultation - no rales, rhonchi or wheezes  CV: regular rate and rhythm, normal S1 S2, no S3 or S4, no murmur, click or rub, no peripheral edema and peripheral pulses strong  SKIN: Erythematous macular rash under bilateral breasts with erythema under left abdominal fold  Excoriated erythematous papular rash left side chest wall  Coalescing erythematous maculopapular rash bilateral upper back    Diagnostic Test Results:  none     ASSESSMENT/PLAN:       ICD-10-CM    1. Rash and nonspecific skin eruption R21 clotrimazole (LOTRIMIN) 1 % external cream     triamcinolone (KENALOG) 0.1 % external cream   2. Candidiasis of breast B37.89 nystatin (MYCOSTATIN) 576160 UNIT/GM external powder   3. Hypertension goal BP (blood pressure) < 140/90 I10      Consider if rash on left chest  "wall is shingles as it is unilateral but she describes as \"very itchy\" and denies any pain. If it were shingles, lesions have crusted over and she would no longer be contagious. Rash under skin folds likely candidiasis and will treat with Nystatin. Unsure of exact etiology of chest and back rash. Will treat with topical antifungal and steroid.   Can continue with Benadryl as needed.   BP improved upon recheck. She reports readings within goal range at home and has correlated home cuff at clinic. No change to medications today.     Patient Instructions   Keep skin clean and dry  Apply Nystatin powder and rub under breasts twice daily  If rash returns in abdominal fold, you can apply there too    Mix Lotrimin cream and Kenalog and apply to chest and back twice daily          [facility]      "

## 2019-05-16 NOTE — PATIENT INSTRUCTIONS
Keep skin clean and dry  Apply Nystatin powder and rub under breasts twice daily  If rash returns in abdominal fold, you can apply there too    Mix Lotrimin cream and Kenalog and apply to chest and back twice daily

## 2019-07-08 ENCOUNTER — TRANSFERRED RECORDS (OUTPATIENT)
Dept: HEALTH INFORMATION MANAGEMENT | Facility: CLINIC | Age: 79
End: 2019-07-08

## 2019-07-22 ENCOUNTER — TRANSFERRED RECORDS (OUTPATIENT)
Dept: HEALTH INFORMATION MANAGEMENT | Facility: CLINIC | Age: 79
End: 2019-07-22

## 2019-10-11 ENCOUNTER — IMMUNIZATION (OUTPATIENT)
Dept: NURSING | Facility: CLINIC | Age: 79
End: 2019-10-11
Payer: MEDICARE

## 2019-10-11 DIAGNOSIS — Z23 NEED FOR PROPHYLACTIC VACCINATION AND INOCULATION AGAINST INFLUENZA: Primary | ICD-10-CM

## 2019-10-11 PROCEDURE — 90662 IIV NO PRSV INCREASED AG IM: CPT

## 2019-10-11 PROCEDURE — 99207 ZZC NO CHARGE NURSE ONLY: CPT

## 2019-10-11 PROCEDURE — G0008 ADMIN INFLUENZA VIRUS VAC: HCPCS

## 2019-10-17 ENCOUNTER — OFFICE VISIT (OUTPATIENT)
Dept: FAMILY MEDICINE | Facility: CLINIC | Age: 79
End: 2019-10-17
Payer: MEDICARE

## 2019-10-17 VITALS
WEIGHT: 161 LBS | OXYGEN SATURATION: 97 % | TEMPERATURE: 97.9 F | DIASTOLIC BLOOD PRESSURE: 75 MMHG | HEART RATE: 74 BPM | SYSTOLIC BLOOD PRESSURE: 159 MMHG | BODY MASS INDEX: 28.07 KG/M2

## 2019-10-17 DIAGNOSIS — I10 HYPERTENSION GOAL BP (BLOOD PRESSURE) < 140/90: ICD-10-CM

## 2019-10-17 DIAGNOSIS — E78.5 HYPERLIPIDEMIA LDL GOAL <130: ICD-10-CM

## 2019-10-17 DIAGNOSIS — E03.4 HYPOTHYROIDISM DUE TO ACQUIRED ATROPHY OF THYROID: ICD-10-CM

## 2019-10-17 DIAGNOSIS — R73.09 ELEVATED GLUCOSE: ICD-10-CM

## 2019-10-17 DIAGNOSIS — R21 RASH AND NONSPECIFIC SKIN ERUPTION: Primary | ICD-10-CM

## 2019-10-17 PROCEDURE — 99214 OFFICE O/P EST MOD 30 MIN: CPT | Performed by: INTERNAL MEDICINE

## 2019-10-17 RX ORDER — CETIRIZINE HYDROCHLORIDE 10 MG/1
10 TABLET ORAL DAILY
COMMUNITY
End: 2019-12-20

## 2019-10-17 RX ORDER — TRIAMTERENE/HYDROCHLOROTHIAZID 37.5-25 MG
1 TABLET ORAL DAILY
Qty: 30 TABLET | Refills: 3 | Status: SHIPPED | OUTPATIENT
Start: 2019-10-17 | End: 2019-10-17

## 2019-10-17 RX ORDER — TRIAMTERENE/HYDROCHLOROTHIAZID 37.5-25 MG
1 TABLET ORAL DAILY
Qty: 90 TABLET | Refills: 0 | Status: SHIPPED | OUTPATIENT
Start: 2019-10-17 | End: 2019-12-20

## 2019-10-17 NOTE — PATIENT INSTRUCTIONS
*due for  Annual physical 4/2020 with fasting labs prior     Use the clobetasol on your back    Your hives are improving (antihistamines)    No more flu shots.        For BP :  Stop Losartan (cough/reaction???)  Take Triam/HCTZ (Maxide) one tab daily    Return to clinic 3 - 4 weeks BP recheck, lab draw.

## 2019-10-17 NOTE — TELEPHONE ENCOUNTER
"Requested Prescriptions   Pending Prescriptions Disp Refills     triamterene-HCTZ (MAXZIDE-25) 37.5-25 MG tablet [Pharmacy Med Name: TRIAMTERENE 37.5MG/ HCTZ 25MG TABS] 90 tablet      Sig: TAKE 1 TABLET BY MOUTH DAILY   Last Written Prescription Date:  10/17/19  Last Fill Quantity: 30,  # refills: 3   Last office visit: No previous visit found with prescribing provider:     Future Office Visit:   Next 5 appointments (look out 90 days)    Nov 22, 2019 10:20 AM CST  SHORT with Hazel Young MD  [facility] ([facility] [address] [place] [address] [id number] [phone]             Diuretics (Including Combos) Protocol Failed - 10/17/2019  4:01 PM        Failed - Blood pressure under 140/90 in past 12 months     BP Readings from Last 3 Encounters:   10/17/19 (!) 159/75   05/16/19 138/72   04/23/19 142/70                 Passed - Recent (12 mo) or future (30 days) visit within the authorizing provider's specialty     Patient has had an office visit with the authorizing provider or a provider within the authorizing providers department within the previous 12 mos or has a future within next 30 days. See \"Patient Info\" tab in inbasket, or \"Choose Columns\" in Meds & Orders section of the refill encounter.              Passed - Medication is active on med list        Passed - Patient is age 18 or older        Passed - No active pregancy on record        Passed - Normal serum creatinine on file in past 12 months     Recent Labs   Lab Test 04/02/19  1223   CR 0.93              Passed - Normal serum potassium on file in past 12 months     Recent Labs   Lab Test 04/02/19  1223   POTASSIUM 4.4                    Passed - Normal serum sodium on file in past 12 months     Recent Labs   Lab Test 04/02/19  1223                 Passed - No positive pregnancy test in past 12 months          "

## 2019-10-17 NOTE — PROGRESS NOTES
"Subjective     Negin Magallon is a 79 year old female who presents to clinic today for the following health issues:  78 y/o F here for rash.  H/o cochlear implant (Meniere's dx), hypothyroid, djd, HTN.  No new medications, but she had a flu shot 3 days prior.      Had   rash 5/2019, but this was intertrigo (screening for DM negative)...  treated w/clotrimazole, nystatin and triamcinolone, seems to have responded.         BP has been elevated lately \"I have been a basket case      HPI   Rash all over, had her Flu shot on Friday and started itching on Sunday and her throat felt like it was closing. Went to the dermatologist on 10/16/19. And was told to take zyrtec and use Cerave cream.           Patient Active Problem List   Diagnosis     Meniere's disease     Cochlear implant in place     Hyperlipidemia LDL goal <130     Hypertension goal BP (blood pressure) < 140/90     DJD (degenerative joint disease)     Gout     Shoulder impingement     Adhesive capsulitis     Hypothyroidism due to acquired atrophy of thyroid     Past Surgical History:   Procedure Laterality Date     ABDOMEN SURGERY       COLONOSCOPY       ENT SURGERY  9/2010     R cochlear implant     EYE SURGERY      L eye muscle     GI SURGERY       HEMORRHOID SURGERY       HYSTERECTOMY, CERVIX STATUS UNKNOWN       IMPLANT REVISION COCHLEA  5/26/2011    Procedure:IMPLANT REVISION COCHLEA;  Remove and replace nucleus cochlear implant right.; Surgeon:BRITTANEY STAUFFER; Location: OR       Social History     Tobacco Use     Smoking status: Former Smoker     Packs/day: 1.00     Years: 50.00     Pack years: 50.00     Types: Cigarettes     Start date: 3/14/1954     Last attempt to quit: 3/14/2004     Years since quitting: 15.6     Smokeless tobacco: Never Used   Substance Use Topics     Alcohol use: No     Comment: rarely,socially/1/2 months     Family History   Problem Relation Age of Onset     Diabetes Father          Current Outpatient Medications "   Medication Sig Dispense Refill     acetaminophen (TYLENOL) 325 MG tablet Take 1-2 tablets by mouth every 6 hours as needed. As needed       CALCIUM + D OR petite 400/500 2 daily       cetirizine (ZYRTEC) 10 MG tablet Take 10 mg by mouth daily       levothyroxine (SYNTHROID/LEVOTHROID) 50 MCG tablet Take 1 tablet (50 mcg) by mouth daily 90 tablet 3     meclizine (ANTIVERT) 25 MG tablet Take 1 tablet (25 mg) by mouth 2 times daily 60 tablet 0     metoprolol tartrate (LOPRESSOR) 25 MG tablet Take 1 tablet (25 mg) by mouth 2 times daily 180 tablet 3     nystatin (MYCOSTATIN) 180401 UNIT/GM external powder Apply topically 2 times daily (Patient taking differently: Apply topically 2 times daily as needed ) 60 g 1     STATIN NOT PRESCRIBED, INTENTIONAL, by Other route continuous prn.  0     triamcinolone (KENALOG) 0.1 % external cream Mix with Lotrimin and apply to chest and back twice daily for 2 weeks 80 g 0     triamterene-HCTZ (MAXZIDE-25) 37.5-25 MG tablet Take 1 tablet by mouth daily 30 tablet 3     clotrimazole (LOTRIMIN) 1 % external cream Apply topically 2 times daily (Patient not taking: Reported on 10/17/2019) 60 g 1     Allergies   Allergen Reactions     Alendronic Acid      Bone pain     Amoxicillin Swelling     tongue     Atorvastatin Calcium      Muscle aches.  Has also tried muvacor, provachol, lopid, and crestor with same results     Lisinopril Cough     Prevacid [Aspartame] Diarrhea     Advil [Ibuprofen] Rash     Prednisone Swelling and Rash     Medrol dose pack     Recent Labs   Lab Test 04/02/19  1223 04/25/18  1225 11/01/17  1154  03/08/17  1303  03/18/16  1223 03/16/15  1613  10/19/12  0837  05/14/12  0904  09/27/11  1511   A1C 6.0*  --   --   --   --   --   --   --   --  5.7  --  6.0  --   --    LDL  --   --   --   --   --   --   --   --   --  212*  --   --   --  188*   HDL  --   --   --   --   --   --   --   --   --  41*  --   --   --  40*   TRIG  --   --   --   --   --   --   --   --   --  310*   --   --   --  355*   ALT  --   --   --   --  25  --  15 21  --   --   --   --   --   --    CR 0.93  --  0.86   < > 0.96   < > 1.08* 0.78   < >  --    < > 0.97  --  0.90   GFRESTIMATED 59*  --  64   < > 57*   < > 50* 72   < >  --    < > 57*  --  62   GFRESTBLACK 69  --  77   < > 68   < > 60* 88   < >  --    < > 69  --  75   POTASSIUM 4.4  --  4.1   < > 4.9   < > 4.2 4.1   < >  --    < > 4.3  --  4.3   TSH 2.82 1.51  --   --  2.32   < > 1.61 1.02   < >  --   --   --    < >  --     < > = values in this interval not displayed.      BP Readings from Last 3 Encounters:   10/17/19 (!) 159/75   05/16/19 138/72   04/23/19 142/70    Wt Readings from Last 3 Encounters:   10/17/19 73 kg (161 lb)   05/16/19 74.4 kg (164 lb)   04/02/19 74.4 kg (164 lb)                       Reviewed and updated as needed this visit by Provider         Review of Systems   ROS COMP: Constitutional, HEENT, cardiovascular, pulmonary, gi and gu systems are negative, except as otherwise noted.      Objective    BP (!) 159/75 (BP Location: Right arm, Patient Position: Sitting, Cuff Size: Adult Regular)   Pulse 74   Temp 97.9  F (36.6  C) (Oral)   Wt 73 kg (161 lb)   SpO2 97%   BMI 28.07 kg/m    Body mass index is 28.07 kg/m .  Physical Exam   GENERAL: healthy, alert and no distress  EYES: Eyes grossly normal to inspection, PERRL and conjunctivae and sclerae normal  HENT: normal cephalic/atraumatic and [place]  MS: no gross musculoskeletal defects noted, no edema  SKIN: confluent hives on extremities and trunk.  In addition she has eczema patches on her back with excoriations.  No current intertrigo.    PSYCH: mentation appears normal, affect normal/bright    Diagnostic Test Results:  Labs reviewed in Epic  Results for orders placed or performed in visit on 04/02/19   Basic metabolic panel   Result Value Ref Range    Sodium 138 133 - 144 mmol/L    Potassium 4.4 3.4 - 5.3 mmol/L    Chloride 102 94 - 109 mmol/L    Carbon Dioxide 29 20 - 32 mmol/L    Anion  "Gap 7 3 - 14 mmol/L    Glucose 117 (H) 70 - 99 mg/dL    Urea Nitrogen 15 7 - 30 mg/dL    Creatinine 0.93 0.52 - 1.04 mg/dL    GFR Estimate 59 (L) >60 mL/min/[1.73_m2]    GFR Estimate If Black 69 >60 mL/min/[1.73_m2]    Calcium 9.1 8.5 - 10.1 mg/dL   TSH with free T4 reflex   Result Value Ref Range    TSH 2.82 0.40 - 4.00 mU/L   Hemoglobin A1c   Result Value Ref Range    Hemoglobin A1C 6.0 (H) 0 - 5.6 %   Vitamin D Deficiency   Result Value Ref Range    Vitamin D Deficiency screening 31 20 - 75 ug/L           Assessment & Plan       ICD-10-CM    1. Rash and nonspecific skin eruption R21    2. Hypertension goal BP (blood pressure) < 140/90 I10 Basic metabolic panel     triamterene-HCTZ (MAXZIDE-25) 37.5-25 MG tablet   3. Hypothyroidism due to acquired atrophy of thyroid E03.4 TSH with free T4 reflex   4. Elevated glucose R73.09 Hemoglobin A1c   5. Hyperlipidemia LDL goal <130 E78.5 Lipid panel reflex to direct LDL Fasting        She will f/u with derm in a couple weeks  I feel her hives are improving  She does have atopic dermatitis on back also, confounding her understanding.   Advised no more flu shots.    She declines short course of prednisone.        Patient Instructions   *due for  Annual physical 4/2020 with fasting labs prior     Use the clobetasol on your back    Your hives are improving (antihistamines)  Declines prednisone, no more to offer her.     No more flu shots.  Her hives/throat swelling seem temporally related to her      For BP :  Stop Losartan (cough/reaction???)  Remote possibility as she is allergic to lisinopril and decribes \"lip and throat swelling\" during initial symptoms   Take Triam/HCTZ (Maxide) one tab daily  BP not well controlled, hopefully diuretic will help, hopefully she will tolerate.     Return to clinic 3 - 4 weeks BP recheck, lab draw.           No follow-ups on file.    Hazel Young MD  [facility]       "

## 2019-10-20 ENCOUNTER — MYC MEDICAL ADVICE (OUTPATIENT)
Dept: FAMILY MEDICINE | Facility: CLINIC | Age: 79
End: 2019-10-20

## 2019-10-21 NOTE — TELEPHONE ENCOUNTER
Patient returned call, see separate triage note for details.     Per protocol, patient should be seen within 3 days.    Patient's BP was 178/86 at noon today.  Pulse 80.    Patient denies head ache, dizziness, blurred vision, or weakness.      Patient reports bloating and fatigue.       Routed to [facility] to review and advise.

## 2019-10-21 NOTE — TELEPHONE ENCOUNTER
Patient's Orderlordhart message: My blood pressure and pulse are very high. The last two readings were (pulse 101, with .76) and (pulse 99 with .82). These two readings were the best of two tries for each reading. It has been pretty high on Friday and getting higher Saturday, and Sunday were the readings listed above.  WHAT SHOULD I DO. I can't let this go on like this.    RN attempted to call patient to triage/determine symptoms.     Attempt #1 to call patient.     Patient did not answer, RN left voicemail at home number. RN requested patient return call to Nurse Triage line at [phone].     Jennifer Phan, RN, BSN, PHN  [facility]: [place]

## 2019-10-22 NOTE — TELEPHONE ENCOUNTER
Team 1: please call her in a couple days (Friday) for update.      RN postponed encounter for Friday to follow up.     Jennifer Phan RN, BSN, PHN  [facility]: [place]

## 2019-10-22 NOTE — TELEPHONE ENCOUNTER
MD called.  SHe also held the metoprolol....    Told her to resume the metoprolol: this will improve BP and pulse  Continue maxide    Keep holding losartan, rash is better.  I suspect she had allergy    Team 1: please call her in a couple days (Friday) for update.

## 2019-10-25 NOTE — TELEPHONE ENCOUNTER
Continue current management current Rx for BP (Maxzide and metoprolol)  Next week, check BMP (due to new med, need to ensure the electrolytes are stable).     I will add losartan to her allergy list

## 2019-10-25 NOTE — TELEPHONE ENCOUNTER
RN called patient for update. Rash and itching are completely gone.     Patient started metoprolol in AM, meclizine at 2pm, and second dose of metoprolol in the PM.     Patient says she is feeling fine. Was nauseous with the meclizine medications, but found taking with with food helps.     BP has been 136/60 -143/74. Pulse 60-65.     Routed to [facility] to review and advise.     Jennifer Phan, RN, BSN, PHN  [facility]: [place]

## 2019-10-25 NOTE — TELEPHONE ENCOUNTER
RN called patient, scheduled lab appointment for next Thursday.     Jennifer Phan RN, BSN, PHN  [facility]: [place]

## 2019-10-31 DIAGNOSIS — E78.5 HYPERLIPIDEMIA LDL GOAL <130: ICD-10-CM

## 2019-10-31 DIAGNOSIS — R73.09 ELEVATED GLUCOSE: ICD-10-CM

## 2019-10-31 DIAGNOSIS — E03.4 HYPOTHYROIDISM DUE TO ACQUIRED ATROPHY OF THYROID: ICD-10-CM

## 2019-10-31 DIAGNOSIS — I10 HYPERTENSION GOAL BP (BLOOD PRESSURE) < 140/90: ICD-10-CM

## 2019-10-31 LAB
ANION GAP SERPL CALCULATED.3IONS-SCNC: 5 MMOL/L (ref 3–14)
BUN SERPL-MCNC: 17 MG/DL (ref 7–30)
CALCIUM SERPL-MCNC: 9.3 MG/DL (ref 8.5–10.1)
CHLORIDE SERPL-SCNC: 96 MMOL/L (ref 94–109)
CO2 SERPL-SCNC: 31 MMOL/L (ref 20–32)
CREAT SERPL-MCNC: 0.91 MG/DL (ref 0.52–1.04)
GFR SERPL CREATININE-BSD FRML MDRD: 61 ML/MIN/{1.73_M2}
GLUCOSE SERPL-MCNC: 152 MG/DL (ref 70–99)
POTASSIUM SERPL-SCNC: 4.2 MMOL/L (ref 3.4–5.3)
SODIUM SERPL-SCNC: 132 MMOL/L (ref 133–144)

## 2019-10-31 PROCEDURE — 80048 BASIC METABOLIC PNL TOTAL CA: CPT | Performed by: INTERNAL MEDICINE

## 2019-10-31 PROCEDURE — 36415 COLL VENOUS BLD VENIPUNCTURE: CPT | Performed by: INTERNAL MEDICINE

## 2019-11-12 NOTE — RESULT ENCOUNTER NOTE
Negin Magallon    Enclosed are your results.  Your labs are normal/stable at this time.   Will likely recheck the sodium at your upcoming appointment, I look forward to seeing you then.     Please call  with any questions.  We can also discuss any questions regarding these labs at your next scheduled visit.    Sincerely,    Hazel Young M.D.    Send note

## 2019-11-22 ENCOUNTER — OFFICE VISIT (OUTPATIENT)
Dept: FAMILY MEDICINE | Facility: CLINIC | Age: 79
End: 2019-11-22
Payer: MEDICARE

## 2019-11-22 VITALS
BODY MASS INDEX: 27.9 KG/M2 | TEMPERATURE: 97.5 F | SYSTOLIC BLOOD PRESSURE: 190 MMHG | HEART RATE: 61 BPM | WEIGHT: 160 LBS | OXYGEN SATURATION: 99 % | DIASTOLIC BLOOD PRESSURE: 80 MMHG

## 2019-11-22 DIAGNOSIS — E03.4 HYPOTHYROIDISM DUE TO ACQUIRED ATROPHY OF THYROID: ICD-10-CM

## 2019-11-22 DIAGNOSIS — R73.09 ELEVATED GLUCOSE: ICD-10-CM

## 2019-11-22 DIAGNOSIS — E78.5 HYPERLIPIDEMIA LDL GOAL <130: ICD-10-CM

## 2019-11-22 DIAGNOSIS — I10 HYPERTENSION GOAL BP (BLOOD PRESSURE) < 140/90: Primary | ICD-10-CM

## 2019-11-22 PROCEDURE — 99214 OFFICE O/P EST MOD 30 MIN: CPT | Performed by: INTERNAL MEDICINE

## 2019-11-22 RX ORDER — AMLODIPINE BESYLATE 10 MG/1
10 TABLET ORAL DAILY
Qty: 90 TABLET | Refills: 3 | Status: SHIPPED | OUTPATIENT
Start: 2019-11-22 | End: 2019-12-20

## 2019-11-22 NOTE — PATIENT INSTRUCTIONS
Add amlodipine 10 mg daily... for the first few days, take 1/2 tab, then take the full dose daily    Continue all other meds    Reduce sodium intake:  Limit to less than 1800 mg sodium per day    Return to clinic one month (late December 2019) for blood pressure follow up BP

## 2019-11-22 NOTE — PROGRESS NOTES
Subjective     Negin Magallon is a 79 year old female who presents to clinic today for the following health issues:    80 y/o F here for BP recheck. recently added HCTZ to her metoprolol metoprolol  regimen.  She had headache/rash with losartan, so unable to add this ..    BP has been 150s at home.                   HPI   Hypertension Follow-up      Do you check your blood pressure regularly outside of the clinic? Yes     Are you following a low salt diet? No    Are your blood pressures ever more than 140 on the top number (systolic) OR more   than 90 on the bottom number (diastolic), for example 140/90? Yes      How many servings of fruits and vegetables do you eat daily?  2-3    On average, how many sweetened beverages do you drink each day (soda, juice, sweet tea, etc)?   0    How many days per week do you miss taking your medication? 0         Patient Active Problem List   Diagnosis     Meniere's disease     Cochlear implant in place     Hyperlipidemia LDL goal <130     Hypertension goal BP (blood pressure) < 140/90     DJD (degenerative joint disease)     Gout     Shoulder impingement     Adhesive capsulitis     Hypothyroidism due to acquired atrophy of thyroid     Past Surgical History:   Procedure Laterality Date     ABDOMEN SURGERY       COLONOSCOPY       ENT SURGERY  9/2010     R cochlear implant     EYE SURGERY      L eye muscle     GI SURGERY       HEMORRHOID SURGERY       HYSTERECTOMY, CERVIX STATUS UNKNOWN       IMPLANT REVISION COCHLEA  5/26/2011    Procedure:IMPLANT REVISION COCHLEA;  Remove and replace nucleus cochlear implant right.; Surgeon:BRITTANEY STAUFFER; Location: OR       Social History     Tobacco Use     Smoking status: Former Smoker     Packs/day: 1.00     Years: 50.00     Pack years: 50.00     Types: Cigarettes     Start date: 3/14/1954     Last attempt to quit: 3/14/2004     Years since quitting: 15.7     Smokeless tobacco: Never Used   Substance Use Topics     Alcohol use: No      Comment: rarely,socially/1/2 months     Family History   Problem Relation Age of Onset     Diabetes Father          Current Outpatient Medications   Medication Sig Dispense Refill     acetaminophen (TYLENOL) 325 MG tablet Take 1-2 tablets by mouth every 6 hours as needed. As needed       amLODIPine (NORVASC) 10 MG tablet Take 1 tablet (10 mg) by mouth daily 90 tablet 3     CALCIUM + D OR petite 400/500 2 daily       levothyroxine (SYNTHROID/LEVOTHROID) 50 MCG tablet Take 1 tablet (50 mcg) by mouth daily 90 tablet 3     meclizine (ANTIVERT) 25 MG tablet Take 1 tablet (25 mg) by mouth 2 times daily 60 tablet 0     metoprolol tartrate (LOPRESSOR) 25 MG tablet Take 1 tablet (25 mg) by mouth 2 times daily 180 tablet 3     STATIN NOT PRESCRIBED, INTENTIONAL, by Other route continuous prn.  0     triamterene-HCTZ (MAXZIDE-25) 37.5-25 MG tablet TAKE 1 TABLET BY MOUTH DAILY 90 tablet 0     cetirizine (ZYRTEC) 10 MG tablet Take 10 mg by mouth daily       clotrimazole (LOTRIMIN) 1 % external cream Apply topically 2 times daily (Patient not taking: Reported on 10/17/2019) 60 g 1     nystatin (MYCOSTATIN) 003492 UNIT/GM external powder Apply topically 2 times daily (Patient not taking: Reported on 11/22/2019) 60 g 1     triamcinolone (KENALOG) 0.1 % external cream Mix with Lotrimin and apply to chest and back twice daily for 2 weeks (Patient not taking: Reported on 11/22/2019) 80 g 0     Allergies   Allergen Reactions     Alendronic Acid      Bone pain     Amoxicillin Swelling     tongue     Atorvastatin Calcium      Muscle aches.  Has also tried muvacor, provachol, lopid, and crestor with same results     Lisinopril Cough     Prevacid [Aspartame] Diarrhea     Advil [Ibuprofen] Rash     Face swelling     Losartan Rash     Prednisone Swelling and Rash     Medrol dose pack     Recent Labs   Lab Test 10/31/19  1000 04/02/19  1223 04/25/18  1225  03/08/17  1303  03/18/16  1223 03/16/15  1613  10/19/12  0837  05/14/12  0904   09/27/11  1511   A1C  --  6.0*  --   --   --   --   --   --   --  5.7  --  6.0  --   --    LDL  --   --   --   --   --   --   --   --   --  212*  --   --   --  188*   HDL  --   --   --   --   --   --   --   --   --  41*  --   --   --  40*   TRIG  --   --   --   --   --   --   --   --   --  310*  --   --   --  355*   ALT  --   --   --   --  25  --  15 21  --   --   --   --   --   --    CR 0.91 0.93  --    < > 0.96   < > 1.08* 0.78   < >  --    < > 0.97  --  0.90   GFRESTIMATED 61 59*  --    < > 57*   < > 50* 72   < >  --    < > 57*  --  62   GFRESTBLACK 71 69  --    < > 68   < > 60* 88   < >  --    < > 69  --  75   POTASSIUM 4.2 4.4  --    < > 4.9   < > 4.2 4.1   < >  --    < > 4.3  --  4.3   TSH  --  2.82 1.51  --  2.32   < > 1.61 1.02   < >  --   --   --    < >  --     < > = values in this interval not displayed.      BP Readings from Last 3 Encounters:   11/22/19 (!) 197/72   10/17/19 (!) 159/75   05/16/19 138/72    Wt Readings from Last 3 Encounters:   11/22/19 72.6 kg (160 lb)   10/17/19 73 kg (161 lb)   05/16/19 74.4 kg (164 lb)                       Reviewed and updated as needed this visit by Provider         Review of Systems   ROS COMP: Constitutional, HEENT, cardiovascular, pulmonary, gi and gu systems are negative, except as otherwise noted.      Objective    BP (!) 190/80   Pulse 61   Temp 97.5  F (36.4  C) (Oral)   Wt 72.6 kg (160 lb)   SpO2 99%   BMI 27.90 kg/m    Body mass index is 27.9 kg/m .     Physical Exam     GENERAL: healthy, alert and no distress  EYES: Eyes grossly normal to inspection, PERRL and conjunctivae and sclerae normal  HENT:  [place], has cochlear implant  MS: no gross musculoskeletal defects noted, no edema  SKIN: no suspicious lesions or rashes  NEURO: Normal strength and tone, mentation intact and speech normal    Diagnostic Test Results:  Labs reviewed in Epic  No results found for any visits on 11/22/19.  reviewed        Assessment & Plan       ICD-10-CM    1. Hypertension  goal BP (blood pressure) < 140/90 I10 Basic metabolic panel     amLODIPine (NORVASC) 10 MG tablet   2. Hypothyroidism due to acquired atrophy of thyroid E03.4 TSH with free T4 reflex   3. Hyperlipidemia LDL goal <130 E78.5 Lipid panel reflex to direct LDL Fasting   4. Elevated glucose R73.09 Hemoglobin A1c      challenging due to multiple drug intolerances.     Return in about 4 weeks (around 12/20/2019) for BP Recheck.    Hazel Young MD  [facility]

## 2019-12-20 ENCOUNTER — OFFICE VISIT (OUTPATIENT)
Dept: FAMILY MEDICINE | Facility: CLINIC | Age: 79
End: 2019-12-20
Payer: MEDICARE

## 2019-12-20 VITALS
WEIGHT: 157 LBS | HEART RATE: 67 BPM | OXYGEN SATURATION: 99 % | BODY MASS INDEX: 27.38 KG/M2 | DIASTOLIC BLOOD PRESSURE: 64 MMHG | SYSTOLIC BLOOD PRESSURE: 116 MMHG | TEMPERATURE: 97.6 F

## 2019-12-20 DIAGNOSIS — I10 HYPERTENSION GOAL BP (BLOOD PRESSURE) < 140/90: Primary | ICD-10-CM

## 2019-12-20 DIAGNOSIS — R60.0 PEDAL EDEMA: ICD-10-CM

## 2019-12-20 PROCEDURE — 99213 OFFICE O/P EST LOW 20 MIN: CPT | Performed by: INTERNAL MEDICINE

## 2019-12-20 RX ORDER — TRIAMTERENE/HYDROCHLOROTHIAZID 37.5-25 MG
1 TABLET ORAL DAILY
Qty: 90 TABLET | Refills: 3 | Status: SHIPPED | OUTPATIENT
Start: 2019-12-20 | End: 2020-11-11

## 2019-12-20 NOTE — PATIENT INSTRUCTIONS
Stop amlodipine:  This could be causing your foot swelling    Call if BP is elevated or if the swelling does not resolve    Continue all other meds  Continue your low sodium diet: This is helping a lot.

## 2019-12-20 NOTE — PROGRESS NOTES
Subjective     Negin Magallon is a 79 year old female who presents to clinic today for the following health issues:  78 y/o F here for f/u HTN.  H/o cochlear implant (Meniere's dx), hypothyroid, djd, HTN (Metoprolol, hctz, norvasc).  BP have been 100-110 systolic  tolerating meds well     ?HPI   Hypertension Follow-up      Do you check your blood pressure regularly outside of the clinic? Yes     Are you following a low salt diet? Yes-tries     Are your blood pressures ever more than 140 on the top number (systolic) OR more   than 90 on the bottom number (diastolic), for example 140/90? Yes      How many servings of fruits and vegetables do you eat daily?  2-3    On average, how many sweetened beverages do you drink each day (Examples: soda, juice, sweet tea, etc.  Do NOT count diet or artificially sweetened beverages)?   0    How many days per week do you miss taking your medication? 0    Feet and ankle swelling x 5 days   Started amlodipine 2 weeks ago.   BP are 108-116   She is following low sodium diet    Patient Active Problem List   Diagnosis     Meniere's disease     Cochlear implant in place     Hyperlipidemia LDL goal <130     Hypertension goal BP (blood pressure) < 140/90     DJD (degenerative joint disease)     Gout     Shoulder impingement     Adhesive capsulitis     Hypothyroidism due to acquired atrophy of thyroid     Past Surgical History:   Procedure Laterality Date     ABDOMEN SURGERY       COLONOSCOPY       ENT SURGERY  9/2010     R cochlear implant     EYE SURGERY      L eye muscle     GI SURGERY       HEMORRHOID SURGERY       HYSTERECTOMY, CERVIX STATUS UNKNOWN       IMPLANT REVISION COCHLEA  5/26/2011    Procedure:IMPLANT REVISION COCHLEA;  Remove and replace nucleus cochlear implant right.; Surgeon:BRITTANEY STAUFFER; Location: OR       Social History     Tobacco Use     Smoking status: Former Smoker     Packs/day: 1.00     Years: 50.00     Pack years: 50.00     Types: Cigarettes     Start  date: 3/14/1954     Last attempt to quit: 3/14/2004     Years since quitting: 15.7     Smokeless tobacco: Never Used   Substance Use Topics     Alcohol use: No     Comment: rarely,socially/1/2 months     Family History   Problem Relation Age of Onset     Diabetes Father          Current Outpatient Medications   Medication Sig Dispense Refill     acetaminophen (TYLENOL) 325 MG tablet Take 1-2 tablets by mouth every 6 hours as needed. As needed       CALCIUM + D OR petite 400/500 2 daily       levothyroxine (SYNTHROID/LEVOTHROID) 50 MCG tablet Take 1 tablet (50 mcg) by mouth daily 90 tablet 3     meclizine (ANTIVERT) 25 MG tablet Take 1 tablet (25 mg) by mouth 2 times daily (Patient taking differently: Take 25 mg by mouth 2 times daily as needed ) 60 tablet 0     metoprolol tartrate (LOPRESSOR) 25 MG tablet Take 1 tablet (25 mg) by mouth 2 times daily 180 tablet 3     nystatin (MYCOSTATIN) 053512 UNIT/GM external powder Apply topically 2 times daily (Patient taking differently: Apply topically 2 times daily as needed ) 60 g 1     STATIN NOT PRESCRIBED, INTENTIONAL, by Other route continuous prn.  0     triamcinolone (KENALOG) 0.1 % external cream Mix with Lotrimin and apply to chest and back twice daily for 2 weeks 80 g 0     triamterene-HCTZ (MAXZIDE-25) 37.5-25 MG tablet Take 1 tablet by mouth daily 90 tablet 3     Allergies   Allergen Reactions     Alendronic Acid      Bone pain     Amoxicillin Swelling     tongue     Atorvastatin Calcium      Muscle aches.  Has also tried muvacor, provachol, lopid, and crestor with same results     Lisinopril Cough     Prevacid [Aspartame] Diarrhea     Advil [Ibuprofen] Rash     Face swelling     Losartan Rash     Prednisone Swelling and Rash     Medrol dose pack     BP Readings from Last 3 Encounters:   12/20/19 116/64   11/22/19 (!) 190/80   10/17/19 (!) 159/75    Wt Readings from Last 3 Encounters:   12/20/19 71.2 kg (157 lb)   11/22/19 72.6 kg (160 lb)   10/17/19 73 kg (161  "lb)                    Reviewed and updated as needed this visit by Provider         Review of Systems   ROS COMP: Constitutional, HEENT, cardiovascular, pulmonary, gi and gu systems are negative, except as otherwise noted.      Objective    /64 (BP Location: Right arm, Patient Position: Sitting, Cuff Size: Adult Regular)   Pulse 67   Temp 97.6  F (36.4  C) (Oral)   Wt 71.2 kg (157 lb)   SpO2 99%   BMI 27.38 kg/m    Body mass index is 27.38 kg/m .  Physical Exam   GENERAL: healthy, alert and no distress  EYES: Eyes grossly normal to inspection, PERRL and conjunctivae and sclerae normal  HENT: [place], cochlear implant hx   RESP: lungs clear to auscultation - no rales, rhonchi or wheezes  CV: regular rate and rhythm, normal S1 S2, no S3 or S4, no murmur, click or rub, no peripheral edema and peripheral pulses strong  MS: no gross musculoskeletal defects noted, mild non pitting edema both ankles.     Diagnostic Test Results:  Labs reviewed in Epic  No results found for any visits on 12/20/19.        Assessment & Plan       ICD-10-CM    1. Hypertension goal BP (blood pressure) < 140/90 I10 triamterene-HCTZ (MAXZIDE-25) 37.5-25 MG tablet   2. Pedal edema R60.0         BMI:   Estimated body mass index is 27.38 kg/m  as calculated from the following:    Height as of 4/2/19: 1.613 m (5' 3.5\").    Weight as of this encounter: 71.2 kg (157 lb).       Patient Instructions   Stop amlodipine:  This could be causing your foot swelling    Call if BP is elevated or if the swelling does not resolve  Consider change hydrochlorothiazide to lasix?     Continue all other meds  Continue your low sodium diet: This is helping a lot.         She does not want LIPIDs checked \"I won't treat it anyway b/c the drugs give me side effects\".   Future order canceled.       Return in about 4 months (around 4/20/2020), or call if BP elevated or if swelling does not improved, for Physical Exam, Lab Work.    Hazel Young MD  [place] [facility]

## 2020-02-18 ENCOUNTER — TELEPHONE (OUTPATIENT)
Dept: FAMILY MEDICINE | Facility: CLINIC | Age: 80
End: 2020-02-18

## 2020-02-18 NOTE — TELEPHONE ENCOUNTER
RN sees future labs in [facility] already: CBC, TSH, HA1c    Attempt #1 to call patient.     Busy signal, will try later.     Jennifer Phan, RN, BSN, PHN  [facility]: [place]

## 2020-02-18 NOTE — TELEPHONE ENCOUNTER
RN called patient and relayed provider's message. Patient verbalized understanding.     Jennifer Phan RN, BSN, PHN  [facility]: [place]

## 2020-02-18 NOTE — TELEPHONE ENCOUNTER
Reason for Call: Request for an order or referral:    Order or referral being requested: Physical labs    Date needed: as soon as possible    Has the patient been seen by the PCP for this problem? NO    Additional comments: Pt would like to get her labs done for her physical ahead of time    Phone number Patient can be reached at:  Home number on file [phone] (home)    Best Time:  any    Can we leave a detailed message on this number?  YES    Call taken on 2/18/2020 at 11:39 AM by Opal Dumas

## 2020-05-25 DIAGNOSIS — I10 ESSENTIAL HYPERTENSION WITH GOAL BLOOD PRESSURE LESS THAN 140/90: ICD-10-CM

## 2020-05-25 DIAGNOSIS — E03.9 HYPOTHYROIDISM, UNSPECIFIED TYPE: ICD-10-CM

## 2020-05-26 RX ORDER — LEVOTHYROXINE SODIUM 50 UG/1
TABLET ORAL
Qty: 90 TABLET | Refills: 3 | Status: SHIPPED | OUTPATIENT
Start: 2020-05-26 | End: 2021-04-29

## 2020-05-26 RX ORDER — METOPROLOL TARTRATE 25 MG/1
TABLET, FILM COATED ORAL
Qty: 180 TABLET | Refills: 3 | Status: SHIPPED | OUTPATIENT
Start: 2020-05-26 | End: 2021-04-29

## 2020-06-11 DIAGNOSIS — R73.09 ELEVATED GLUCOSE: ICD-10-CM

## 2020-06-11 DIAGNOSIS — E03.4 HYPOTHYROIDISM DUE TO ACQUIRED ATROPHY OF THYROID: ICD-10-CM

## 2020-06-11 DIAGNOSIS — I10 HYPERTENSION GOAL BP (BLOOD PRESSURE) < 140/90: ICD-10-CM

## 2020-06-11 LAB
ANION GAP SERPL CALCULATED.3IONS-SCNC: 8 MMOL/L (ref 3–14)
BUN SERPL-MCNC: 31 MG/DL (ref 7–30)
CALCIUM SERPL-MCNC: 9.8 MG/DL (ref 8.5–10.1)
CHLORIDE SERPL-SCNC: 97 MMOL/L (ref 94–109)
CO2 SERPL-SCNC: 29 MMOL/L (ref 20–32)
CREAT SERPL-MCNC: 1.24 MG/DL (ref 0.52–1.04)
GFR SERPL CREATININE-BSD FRML MDRD: 41 ML/MIN/{1.73_M2}
GLUCOSE SERPL-MCNC: 115 MG/DL (ref 70–99)
HBA1C MFR BLD: 6 % (ref 0–5.6)
POTASSIUM SERPL-SCNC: 3.9 MMOL/L (ref 3.4–5.3)
SODIUM SERPL-SCNC: 134 MMOL/L (ref 133–144)
TSH SERPL DL<=0.005 MIU/L-ACNC: 2.5 MU/L (ref 0.4–4)

## 2020-06-11 PROCEDURE — 36415 COLL VENOUS BLD VENIPUNCTURE: CPT | Performed by: INTERNAL MEDICINE

## 2020-06-11 PROCEDURE — 83036 HEMOGLOBIN GLYCOSYLATED A1C: CPT | Performed by: INTERNAL MEDICINE

## 2020-06-11 PROCEDURE — 84443 ASSAY THYROID STIM HORMONE: CPT | Performed by: INTERNAL MEDICINE

## 2020-06-11 PROCEDURE — 80048 BASIC METABOLIC PNL TOTAL CA: CPT | Performed by: INTERNAL MEDICINE

## 2020-06-22 ENCOUNTER — OFFICE VISIT (OUTPATIENT)
Dept: FAMILY MEDICINE | Facility: CLINIC | Age: 80
End: 2020-06-22
Payer: MEDICARE

## 2020-06-22 VITALS
HEIGHT: 64 IN | WEIGHT: 157 LBS | HEART RATE: 68 BPM | TEMPERATURE: 98.4 F | DIASTOLIC BLOOD PRESSURE: 70 MMHG | BODY MASS INDEX: 26.8 KG/M2 | OXYGEN SATURATION: 98 % | SYSTOLIC BLOOD PRESSURE: 150 MMHG

## 2020-06-22 DIAGNOSIS — I10 HYPERTENSION GOAL BP (BLOOD PRESSURE) < 140/90: Primary | ICD-10-CM

## 2020-06-22 DIAGNOSIS — Z96.21 COCHLEAR IMPLANT IN PLACE: ICD-10-CM

## 2020-06-22 DIAGNOSIS — G62.9 NEUROPATHY: ICD-10-CM

## 2020-06-22 DIAGNOSIS — E03.4 HYPOTHYROIDISM DUE TO ACQUIRED ATROPHY OF THYROID: ICD-10-CM

## 2020-06-22 PROCEDURE — 99214 OFFICE O/P EST MOD 30 MIN: CPT | Performed by: INTERNAL MEDICINE

## 2020-06-22 ASSESSMENT — PAIN SCALES - GENERAL: PAINLEVEL: NO PAIN (0)

## 2020-06-22 ASSESSMENT — MIFFLIN-ST. JEOR: SCORE: 1169.21

## 2020-06-22 NOTE — PATIENT INSTRUCTIONS
"For itchy feet:  Try some creams at night.  For example, \"neuropathy cream\" ...   \"aspercreme\"...  Aloe cream....      RETURN TO CLINIC after Debbie for your annual physical (if it feels safe)  "

## 2020-06-22 NOTE — PROGRESS NOTES
Subjective     Negin Magallon is a 80 year old female who presents to clinic today for the following health issues:    81 y/o F here for f/u HTN.      H/o cochlear implant (Meniere's dx), hypothyroid, djd, HTN (Metoprolol, hctz, low sodium diet).  BP have been *110-140 systolic,  tolerating meds well ...   Has lost 5#.        Stressor =  may have dementia.  [place] Pandemic.     Complains of itching at bottom of feet.  No ankle edema (had been due amlodipine)    Recent TSH normal, A1C screening (slightly elevated glucoses) is 6.0,.  Her Bun/Cr is 31/1.24, a little elevated    Itchy feet:  Soles itch when she is in bed, her great toes feel numb as well.  No daytime issues.      HPI   Hypertension Follow-up      Do you check your blood pressure regularly outside of the clinic? Yes     Are you following a low salt diet? Yes    Are your blood pressures ever more than 140 on the top number (systolic) OR more   than 90 on the bottom number (diastolic), for example 140/90? Yes      How many servings of fruits and vegetables do you eat daily?  2-3    On average, how many sweetened beverages do you drink each day (Examples: soda, juice, sweet tea, etc.  Do NOT count diet or artificially sweetened beverages)?   0    How many days per week do you exercise enough to make your heart beat faster?     How many minutes a day do you exercise enough to make your heart beat faster?     How many days per week do you miss taking your medication? 0         Patient Active Problem List   Diagnosis     Meniere's disease     Cochlear implant in place     Hyperlipidemia LDL goal <130     Hypertension goal BP (blood pressure) < 140/90     DJD (degenerative joint disease)     Gout     Shoulder impingement     Adhesive capsulitis     Hypothyroidism due to acquired atrophy of thyroid     Past Surgical History:   Procedure Laterality Date     ABDOMEN SURGERY       COLONOSCOPY       ENT SURGERY  9/2010     R cochlear implant     EYE  SURGERY      L eye muscle     GI SURGERY       HEMORRHOID SURGERY       HYSTERECTOMY, CERVIX STATUS UNKNOWN       IMPLANT REVISION COCHLEA  2011    Procedure:IMPLANT REVISION COCHLEA;  Remove and replace nucleus cochlear implant right.; Surgeon:BRITTANEY STAUFFER; Location: OR       Social History     Tobacco Use     Smoking status: Former Smoker     Packs/day: 1.00     Years: 50.00     Pack years: 50.00     Types: Cigarettes     Start date: 3/14/1954     Last attempt to quit: 3/14/2004     Years since quittin.2     Smokeless tobacco: Never Used   Substance Use Topics     Alcohol use: No     Comment: rarely,socially/1/2 months     Family History   Problem Relation Age of Onset     Diabetes Father          Current Outpatient Medications   Medication Sig Dispense Refill     CALCIUM + D OR petite 400/500 2 daily       levothyroxine (SYNTHROID/LEVOTHROID) 50 MCG tablet TAKE 1 TABLET BY MOUTH EVERY DAY 90 tablet 3     metoprolol tartrate (LOPRESSOR) 25 MG tablet TAKE 1 TABLET BY MOUTH TWICE DAILY 180 tablet 3     STATIN NOT PRESCRIBED, INTENTIONAL, by Other route continuous prn.  0     triamterene-HCTZ (MAXZIDE-25) 37.5-25 MG tablet Take 1 tablet by mouth daily 90 tablet 3     acetaminophen (TYLENOL) 325 MG tablet Take 1-2 tablets by mouth every 6 hours as needed. As needed       meclizine (ANTIVERT) 25 MG tablet Take 1 tablet (25 mg) by mouth 2 times daily (Patient not taking: Reported on 2020) 60 tablet 0     nystatin (MYCOSTATIN) 408459 UNIT/GM external powder Apply topically 2 times daily (Patient not taking: Reported on 2020) 60 g 1     triamcinolone (KENALOG) 0.1 % external cream Mix with Lotrimin and apply to chest and back twice daily for 2 weeks (Patient not taking: Reported on 2020) 80 g 0     Allergies   Allergen Reactions     Alendronic Acid      Bone pain     Amlodipine Swelling     See late 2019 notes.      Amoxicillin Swelling     tongue     Atorvastatin Calcium      Muscle  "aches.  Has also tried muvacor, provachol, lopid, and crestor with same results     Lisinopril Cough     Prevacid [Aspartame] Diarrhea     Advil [Ibuprofen] Rash     Face swelling     Losartan Rash     Prednisone Swelling and Rash     Medrol dose pack     Recent Labs   Lab Test 06/11/20  0814 10/31/19  1000 04/02/19  1223  03/08/17  1303  03/18/16  1223 03/16/15  1613  10/19/12  0837   A1C 6.0*  --  6.0*  --   --   --   --   --   --  5.7   LDL  --   --   --   --   --   --   --   --   --  212*   HDL  --   --   --   --   --   --   --   --   --  41*   TRIG  --   --   --   --   --   --   --   --   --  310*   ALT  --   --   --   --  25  --  15 21  --   --    CR 1.24* 0.91 0.93   < > 0.96   < > 1.08* 0.78   < >  --    GFRESTIMATED 41* 61 59*   < > 57*   < > 50* 72   < >  --    GFRESTBLACK 48* 71 69   < > 68   < > 60* 88   < >  --    POTASSIUM 3.9 4.2 4.4   < > 4.9   < > 4.2 4.1   < >  --    TSH 2.50  --  2.82   < > 2.32   < > 1.61 1.02   < >  --     < > = values in this interval not displayed.      BP Readings from Last 3 Encounters:   06/22/20 (!) 150/70   12/20/19 116/64   11/22/19 (!) 190/80    Wt Readings from Last 3 Encounters:   06/22/20 71.2 kg (157 lb)   12/20/19 71.2 kg (157 lb)   11/22/19 72.6 kg (160 lb)                       Reviewed and updated as needed this visit by Provider         Review of Systems   Constitutional, HEENT, cardiovascular, pulmonary, gi and gu systems are negative, except as otherwise noted.      Objective    BP (!) 150/70 (BP Location: Left arm, Patient Position: Chair, Cuff Size: Adult Regular)   Pulse 68   Temp 98.4  F (36.9  C) (Oral)   Ht 1.613 m (5' 3.5\")   Wt 71.2 kg (157 lb)   SpO2 98%   Breastfeeding No   BMI 27.38 kg/m    Body mass index is 27.38 kg/m .     Physical Exam   GENERAL: healthy, alert and no distress  EYES: Eyes grossly normal to inspection, PERRL and conjunctivae and sclerae normal  Ears:  Cochlear implant and very [place].  Provider wore shield only (no mask) so " "she could read lips.    MS: no gross musculoskeletal defects noted, no edema  SKIN: no suspicious lesions or rashes.  Bottom of feet look normal.    NEURO: Normal strength and tone, mentation intact and speech normal  PSYCH: mentation appears normal, affect normal/bright    Diagnostic Test Results:  Labs reviewed in Epic and discussed with patient.   No results found for any visits on 06/22/20.        Assessment & Plan       ICD-10-CM    1. Hypertension goal BP (blood pressure) < 140/90  I10    2. Hypothyroidism due to acquired atrophy of thyroid  E03.4    3. Cochlear implant in place  Z96.21    4. Neuropathy  G62.9       her itchy feet likely neuropathy.   Her BP is well controlled at home.   Today was elevated, she feels it was b/c she was very nervous to come here.     BMI:   Estimated body mass index is 27.38 kg/m  as calculated from the following:    Height as of this encounter: 1.613 m (5' 3.5\").    Weight as of this encounter: 71.2 kg (157 lb).       Continue current management     Return in about 6 months (around 12/22/2020) for Physical Exam.    Hazel Young MD  [facility]      "

## 2020-10-09 ENCOUNTER — IMMUNIZATION (OUTPATIENT)
Dept: NURSING | Facility: CLINIC | Age: 80
End: 2020-10-09
Payer: MEDICARE

## 2020-10-09 DIAGNOSIS — Z23 NEED FOR PROPHYLACTIC VACCINATION AND INOCULATION AGAINST INFLUENZA: Primary | ICD-10-CM

## 2020-10-09 PROCEDURE — 90662 IIV NO PRSV INCREASED AG IM: CPT

## 2020-10-09 PROCEDURE — G0008 ADMIN INFLUENZA VIRUS VAC: HCPCS

## 2020-10-09 NOTE — NURSING NOTE
Patient instructed to remain in clinic for 15 minutes afterwards, and to report any adverse reaction to me immediately.    Prior to injection verified patient identity using patient's name and date of birth.  Vaccine information supplied for influenza.  Arlene See MIKAYLA Allen

## 2020-11-11 ENCOUNTER — MYC REFILL (OUTPATIENT)
Dept: FAMILY MEDICINE | Facility: CLINIC | Age: 80
End: 2020-11-11

## 2020-11-11 DIAGNOSIS — I10 HYPERTENSION GOAL BP (BLOOD PRESSURE) < 140/90: ICD-10-CM

## 2020-11-12 RX ORDER — TRIAMTERENE/HYDROCHLOROTHIAZID 37.5-25 MG
1 TABLET ORAL DAILY
Qty: 90 TABLET | Refills: 3 | Status: SHIPPED | OUTPATIENT
Start: 2020-11-12 | End: 2021-04-29 | Stop reason: ALTCHOICE

## 2020-11-24 NOTE — PROGRESS NOTES
Abeba for the following orders.     Ida Reveles MD.   Family Physician.  [facility].        64

## 2020-12-13 ENCOUNTER — HEALTH MAINTENANCE LETTER (OUTPATIENT)
Age: 80
End: 2020-12-13

## 2021-02-15 ENCOUNTER — IMMUNIZATION (OUTPATIENT)
Dept: NURSING | Facility: CLINIC | Age: 81
End: 2021-02-15
Payer: MEDICARE

## 2021-02-15 PROCEDURE — 91300 PR COVID VAC PFIZER DIL RECON 30 MCG/0.3 ML IM: CPT

## 2021-02-15 PROCEDURE — 0001A PR COVID VAC PFIZER DIL RECON 30 MCG/0.3 ML IM: CPT

## 2021-03-08 ENCOUNTER — IMMUNIZATION (OUTPATIENT)
Dept: NURSING | Facility: CLINIC | Age: 81
End: 2021-03-08
Attending: FAMILY MEDICINE
Payer: MEDICARE

## 2021-03-08 PROCEDURE — 0002A PR COVID VAC PFIZER DIL RECON 30 MCG/0.3 ML IM: CPT

## 2021-03-08 PROCEDURE — 91300 PR COVID VAC PFIZER DIL RECON 30 MCG/0.3 ML IM: CPT

## 2021-03-15 ENCOUNTER — MYC MEDICAL ADVICE (OUTPATIENT)
Dept: INTERNAL MEDICINE | Facility: CLINIC | Age: 81
End: 2021-03-15

## 2021-03-15 DIAGNOSIS — I10 HYPERTENSION GOAL BP (BLOOD PRESSURE) < 140/90: ICD-10-CM

## 2021-03-15 DIAGNOSIS — E03.4 HYPOTHYROIDISM DUE TO ACQUIRED ATROPHY OF THYROID: ICD-10-CM

## 2021-03-15 DIAGNOSIS — E78.5 HYPERLIPIDEMIA LDL GOAL <130: Primary | ICD-10-CM

## 2021-03-16 ENCOUNTER — MYC MEDICAL ADVICE (OUTPATIENT)
Dept: INTERNAL MEDICINE | Facility: CLINIC | Age: 81
End: 2021-03-16

## 2021-03-16 DIAGNOSIS — R73.09 ELEVATED GLUCOSE: Primary | ICD-10-CM

## 2021-04-26 DIAGNOSIS — R73.09 ELEVATED GLUCOSE: ICD-10-CM

## 2021-04-26 DIAGNOSIS — E78.5 HYPERLIPIDEMIA LDL GOAL <130: ICD-10-CM

## 2021-04-26 DIAGNOSIS — I10 HYPERTENSION GOAL BP (BLOOD PRESSURE) < 140/90: ICD-10-CM

## 2021-04-26 DIAGNOSIS — E03.4 HYPOTHYROIDISM DUE TO ACQUIRED ATROPHY OF THYROID: ICD-10-CM

## 2021-04-26 LAB
ANION GAP SERPL CALCULATED.3IONS-SCNC: 7 MMOL/L (ref 3–14)
BUN SERPL-MCNC: 29 MG/DL (ref 7–30)
CALCIUM SERPL-MCNC: 9.7 MG/DL (ref 8.5–10.1)
CHLORIDE SERPL-SCNC: 102 MMOL/L (ref 94–109)
CHOLEST SERPL-MCNC: 304 MG/DL
CO2 SERPL-SCNC: 30 MMOL/L (ref 20–32)
CREAT SERPL-MCNC: 1.35 MG/DL (ref 0.52–1.04)
GFR SERPL CREATININE-BSD FRML MDRD: 37 ML/MIN/{1.73_M2}
GLUCOSE SERPL-MCNC: 123 MG/DL (ref 70–99)
HBA1C MFR BLD: 5.8 % (ref 0–5.6)
HDLC SERPL-MCNC: 45 MG/DL
LDLC SERPL CALC-MCNC: 201 MG/DL
NONHDLC SERPL-MCNC: 259 MG/DL
POTASSIUM SERPL-SCNC: 4.5 MMOL/L (ref 3.4–5.3)
SODIUM SERPL-SCNC: 139 MMOL/L (ref 133–144)
TRIGL SERPL-MCNC: 292 MG/DL
TSH SERPL DL<=0.005 MIU/L-ACNC: 1.36 MU/L (ref 0.4–4)

## 2021-04-26 PROCEDURE — 84443 ASSAY THYROID STIM HORMONE: CPT | Performed by: INTERNAL MEDICINE

## 2021-04-26 PROCEDURE — 80048 BASIC METABOLIC PNL TOTAL CA: CPT | Performed by: INTERNAL MEDICINE

## 2021-04-26 PROCEDURE — 80061 LIPID PANEL: CPT | Performed by: INTERNAL MEDICINE

## 2021-04-26 PROCEDURE — 83036 HEMOGLOBIN GLYCOSYLATED A1C: CPT | Performed by: INTERNAL MEDICINE

## 2021-04-26 PROCEDURE — 36415 COLL VENOUS BLD VENIPUNCTURE: CPT | Performed by: INTERNAL MEDICINE

## 2021-04-29 ENCOUNTER — OFFICE VISIT (OUTPATIENT)
Dept: INTERNAL MEDICINE | Facility: CLINIC | Age: 81
End: 2021-04-29
Payer: MEDICARE

## 2021-04-29 VITALS
SYSTOLIC BLOOD PRESSURE: 154 MMHG | DIASTOLIC BLOOD PRESSURE: 76 MMHG | WEIGHT: 154 LBS | TEMPERATURE: 98 F | BODY MASS INDEX: 26.29 KG/M2 | HEART RATE: 72 BPM | HEIGHT: 64 IN

## 2021-04-29 DIAGNOSIS — H81.09 MENIERE'S DISEASE, UNSPECIFIED LATERALITY: ICD-10-CM

## 2021-04-29 DIAGNOSIS — Z00.01 ENCOUNTER FOR GENERAL ADULT MEDICAL EXAMINATION WITH ABNORMAL FINDINGS: Primary | ICD-10-CM

## 2021-04-29 DIAGNOSIS — I10 ESSENTIAL HYPERTENSION WITH GOAL BLOOD PRESSURE LESS THAN 140/90: ICD-10-CM

## 2021-04-29 DIAGNOSIS — Z11.59 NEED FOR HEPATITIS C SCREENING TEST: ICD-10-CM

## 2021-04-29 DIAGNOSIS — I10 HYPERTENSION GOAL BP (BLOOD PRESSURE) < 140/90: ICD-10-CM

## 2021-04-29 DIAGNOSIS — N18.32 CHRONIC KIDNEY DISEASE, STAGE 3B (H): ICD-10-CM

## 2021-04-29 DIAGNOSIS — E03.4 HYPOTHYROIDISM DUE TO ACQUIRED ATROPHY OF THYROID: ICD-10-CM

## 2021-04-29 DIAGNOSIS — M10.072 IDIOPATHIC GOUT OF LEFT FOOT, UNSPECIFIED CHRONICITY: ICD-10-CM

## 2021-04-29 DIAGNOSIS — Z00.00 ENCOUNTER FOR MEDICARE ANNUAL WELLNESS EXAM: ICD-10-CM

## 2021-04-29 DIAGNOSIS — Z96.21 COCHLEAR IMPLANT IN PLACE: ICD-10-CM

## 2021-04-29 DIAGNOSIS — E03.9 HYPOTHYROIDISM, UNSPECIFIED TYPE: ICD-10-CM

## 2021-04-29 PROCEDURE — 99214 OFFICE O/P EST MOD 30 MIN: CPT | Mod: 25 | Performed by: INTERNAL MEDICINE

## 2021-04-29 PROCEDURE — 99397 PER PM REEVAL EST PAT 65+ YR: CPT | Performed by: INTERNAL MEDICINE

## 2021-04-29 RX ORDER — INDOMETHACIN 25 MG/1
25 CAPSULE ORAL 2 TIMES DAILY WITH MEALS
Qty: 30 CAPSULE | Refills: 0 | Status: SHIPPED | OUTPATIENT
Start: 2021-04-29 | End: 2023-06-01

## 2021-04-29 RX ORDER — METOPROLOL TARTRATE 25 MG/1
25 TABLET, FILM COATED ORAL 2 TIMES DAILY
Qty: 180 TABLET | Refills: 3 | Status: SHIPPED | OUTPATIENT
Start: 2021-04-29 | End: 2022-05-23

## 2021-04-29 RX ORDER — MECLIZINE HYDROCHLORIDE 25 MG/1
25 TABLET ORAL
Qty: 60 TABLET | Refills: 0 | Status: SHIPPED | OUTPATIENT
Start: 2021-04-29 | End: 2022-05-23

## 2021-04-29 RX ORDER — FUROSEMIDE 20 MG
20 TABLET ORAL DAILY
Qty: 90 TABLET | Refills: 1 | Status: SHIPPED | OUTPATIENT
Start: 2021-04-29 | End: 2021-10-13

## 2021-04-29 RX ORDER — LEVOTHYROXINE SODIUM 50 UG/1
50 TABLET ORAL DAILY
Qty: 90 TABLET | Refills: 3 | Status: SHIPPED | OUTPATIENT
Start: 2021-04-29 | End: 2022-05-23

## 2021-04-29 ASSESSMENT — MIFFLIN-ST. JEOR: SCORE: 1158.54

## 2021-04-29 NOTE — PROGRESS NOTES
"SUBJECTIVE:   Negin Magallon is a 81 year old female who presents for Preventive Visit.    {Split Bill scripting  The purpose of this visit is to discuss your medical history and prevent health problems before you are sick. You may be responsible for a co-pay, coinsurance, or deductible if your visit today includes services such as checking on a sore throat, having an x-ray or lab test, or treating and evaluating a new or existing condition :278870}  Patient has been advised of split billing requirements and indicates understanding: {Yes and No:772270}   Are you in the first 12 months of your Medicare coverage?  { :533658::\"No\"}    HPI  Do you feel safe in your environment? { :126946}    Have you ever done Advance Care Planning? (For example, a Health Directive, POLST, or a discussion with a medical provider or your loved ones about your wishes): { :227832}    {Hearing Test Done (Optional):140646}   Fall risk  { :808299}  {If any of the above assessments are answered yes, consider ordering appropriate referrals (Optional):522998::\"click delete button to remove this line now\"}  Cognitive Screening { :129980}    {Do you have sleep apnea, excessive snoring or daytime drowsiness? (Optional):002013}    Reviewed and updated as needed this visit by clinical staff                 Reviewed and updated as needed this visit by Provider                Social History     Tobacco Use     Smoking status: Former Smoker     Packs/day: 1.00     Years: 50.00     Pack years: 50.00     Types: Cigarettes     Start date: 3/14/1954     Quit date: 3/14/2004     Years since quittin.1     Smokeless tobacco: Never Used   Substance Use Topics     Alcohol use: No     Comment: rarely,socially/1/2 months     {Rooming Staff- Complete this question if Prescreen response is not shown below for today's visit. If you drink alcohol do you typically have >3 drinks per day or >7 drinks per week? (Optional):100710}    Alcohol Use 2019 " "  Prescreen: >3 drinks/day or >7 drinks/week? No   Prescreen: >3 drinks/day or >7 drinks/week? -   {add AUDIT responses (Optional) (A score of 7 for adult men is an indication of hazardous drinking; a score of 8 or more is an indication of an alcohol use disorder.  A score of 7 or more for adult women is an indication of hazardous drinking or an alchohol use disorder):395284}    {Outside tests to abstract? :449898}    {additional problems to add (Optional):921678}    Current providers sharing in care for this patient include: {Rooming staff:  Please update Care Team in Rooming Activity, refresh this note and then delete this statement}  Patient Care Team:  Hazel Young MD as PCP - General (Internal Medicine)  Hazel Young MD as Assigned PCP    The following health maintenance items are reviewed in Epic and correct as of today:  Health Maintenance Due   Topic Date Due     ANNUAL REVIEW OF HM ORDERS  Never done     HEPATITIS C SCREENING  Never done     DTAP/TDAP/TD IMMUNIZATION (1 - Tdap) Never done     ZOSTER IMMUNIZATION (1 of 2) Never done     MEDICARE ANNUAL WELLNESS VISIT  04/02/2020     FALL RISK ASSESSMENT  04/02/2020     PHQ-2  01/01/2021     {Chronicprobdata (optional):576642}  {Decision Support (Optional):862544}    {Mammo DS 75+ :274233}  Pertinent mammograms are reviewed under the imaging tab.    Review of Systems  {ROS COMP (Optional):301140}    OBJECTIVE:   There were no vitals taken for this visit. Estimated body mass index is 27.38 kg/m  as calculated from the following:    Height as of 6/22/20: 1.613 m (5' 3.5\").    Weight as of 6/22/20: 71.2 kg (157 lb).  Physical Exam  {Exam (Optional) :981707}    {Diagnostic Test Results (Optional):936233::\"Diagnostic Test Results:\",\"Labs reviewed in Epic\"}    ASSESSMENT / PLAN:   {Diag Picklist:634478}    Patient has been advised of split billing requirements and indicates understanding: {YES / NO:956386::\"Yes\"}  COUNSELING:  {Medicare " "Counselin}    Estimated body mass index is 27.38 kg/m  as calculated from the following:    Height as of 20: 1.613 m (5' 3.5\").    Weight as of 20: 71.2 kg (157 lb).    {Weight Management Plan (ACO) Complete if BMI is abnormal-  Ages 18-64  BMI >24.9.  Age 65+ with BMI <23 or >30 (Optional):982628}    She reports that she quit smoking about 17 years ago. Her smoking use included cigarettes. She started smoking about 67 years ago. She has a 50.00 pack-year smoking history. She has never used smokeless tobacco.      Appropriate preventive services were discussed with this patient, including applicable screening as appropriate for cardiovascular disease, diabetes, osteopenia/osteoporosis, and glaucoma.  As appropriate for age/gender, discussed screening for colorectal cancer, prostate cancer, breast cancer, and cervical cancer. Checklist reviewing preventive services available has been given to the patient.    Reviewed patients plan of care and provided an AVS. The {CarePlan:913425} for Negin meets the Care Plan requirement. This Care Plan has been established and reviewed with the {PATIENT, FAMILY MEMBER, CAREGIVER:644219}.    Counseling Resources:  ATP IV Guidelines  Pooled Cohorts Equation Calculator  Breast Cancer Risk Calculator  Breast Cancer: Medication to Reduce Risk  FRAX Risk Assessment  ICSI Preventive Guidelines  Dietary Guidelines for Americans, 2010  USDA's MyPlate  ASA Prophylaxis  Lung CA Screening    Hazel Young MD  [facility]    Identified Health Risks:  "

## 2021-04-29 NOTE — PROGRESS NOTES
"  SUBJECTIVE:   Negin Magallon is a 81 year old female who presents for Preventive Visit.  80 y/o F here for AFE.   H/o cochlear implant (Meniere's dx), hypothyroid, djd, HTN (Metoprolol, hctz, low sodium diet).  BP have been 110-140 systolic,  tolerating meds well ...   Has lost 3#.      However, creatinine slowly rising.  GFR was 60 in 2019, 37 now.       Having some loose stools     Patient has been advised of split billing requirements and indicates understanding: Yes  Are you in the first 12 months of your Medicare Part B coverage?  No    Physical Health:    In general, how would you rate your overall physical health? good    Outside of work, how many days during the week do you exercise? 2-3 days/week    Outside of work, approximately how many minutes a day do you exercise?less than 15 minutes    If you drink alcohol do you typically have >3 drinks per day or >7 drinks per week? No    Do you usually eat at least 4 servings of fruit and vegetables a day, include whole grains & fiber and avoid regularly eating high fat or \"junk\" foods? Yes    Do you have any problems taking medications regularly?  No    Do you have any side effects from medications? none    Needs assistance for the following daily activities: telephone use and housework    Which of the following safety concerns are present in your home?  none identified     Hearing impairment: Yes     In the past 6 months, have you been bothered by leaking of urine? no    Mental Health:    In general, how would you rate your overall mental or emotional health? good  PHQ-2 Score: 0    Do you feel safe in your environment? Yes    Have you ever done Advance Care Planning? (For example, a Health Directive, POLST, or a discussion with a medical provider or your loved ones about your wishes): Yes, advance care planning is on file.    Additional concerns to address? No     Fall risk:  Fall Risk Assessment not completed.    Cognitive Screening:not done,hearing " problem     Do you have sleep apnea, excessive snoring or daytime drowsiness?: no      Reviewed and updated as needed this visit by clinical staff  Tobacco  Allergies    Med Hx  Surg Hx  Fam Hx  Soc Hx        Reviewed and updated as needed this visit by Provider                Social History     Tobacco Use     Smoking status: Former Smoker     Packs/day: 1.00     Years: 50.00     Pack years: 50.00     Types: Cigarettes     Start date: 3/14/1954     Quit date: 3/14/2004     Years since quittin.1     Smokeless tobacco: Never Used   Substance Use Topics     Alcohol use: No     Comment: rarely,socially/1/2 months                           Current providers sharing in care for this patient include:   Patient Care Team:  Hazel Young MD as PCP - General (Internal Medicine)  Hazel Young MD as Assigned PCP    The following health maintenance items are reviewed in Epic and correct as of today:  Health Maintenance   Topic Date Due     ANNUAL REVIEW OF  ORDERS  Never done     HEPATITIS C SCREENING  Never done     DTAP/TDAP/TD IMMUNIZATION (1 - Tdap) Never done     ZOSTER IMMUNIZATION (1 of 2) Never done     MEDICARE ANNUAL WELLNESS VISIT  2020     FALL RISK ASSESSMENT  2020     BMP  2022     TSH W/FREE T4 REFLEX  2022     ADVANCE CARE PLANNING  11/15/2023     LIPID  2026     DEXA  2032     PHQ-2  Completed     INFLUENZA VACCINE  Completed     Pneumococcal Vaccine: Pediatrics (0 to 5 Years) and At-Risk Patients (6 to 64 Years)  Completed     Pneumococcal Vaccine: 65+ Years  Completed     COVID-19 Vaccine  Completed     IPV IMMUNIZATION  Aged Out     MENINGITIS IMMUNIZATION  Aged Out     HEPATITIS B IMMUNIZATION  Aged Out     Labs reviewed in EPIC  BP Readings from Last 3 Encounters:   21 (!) 154/76   20 (!) 150/70   19 116/64    Wt Readings from Last 3 Encounters:   21 69.9 kg (154 lb)   20 71.2 kg (157 lb)   19 71.2 kg (157  lb)                  Patient Active Problem List   Diagnosis     Meniere's disease     Cochlear implant in place     Hyperlipidemia LDL goal <130     Hypertension goal BP (blood pressure) < 140/90     DJD (degenerative joint disease)     Gout     Shoulder impingement     Adhesive capsulitis     Hypothyroidism due to acquired atrophy of thyroid     Past Surgical History:   Procedure Laterality Date     ABDOMEN SURGERY       COLONOSCOPY       ENT SURGERY  2010     R cochlear implant     EYE SURGERY      L eye muscle     GI SURGERY       HEMORRHOID SURGERY       HYSTERECTOMY, CERVIX STATUS UNKNOWN       IMPLANT REVISION COCHLEA  2011    Procedure:IMPLANT REVISION COCHLEA;  Remove and replace nucleus cochlear implant right.; Surgeon:BRITTANEY STAUFFER; Location: OR       Social History     Tobacco Use     Smoking status: Former Smoker     Packs/day: 1.00     Years: 50.00     Pack years: 50.00     Types: Cigarettes     Start date: [birth year]/14/1954     Quit date: 3/14/2004     Years since quittin.1     Smokeless tobacco: Never Used   Substance Use Topics     Alcohol use: No     Comment: rarely,socially/1/2 months     Family History   Problem Relation Age of Onset     Diabetes Father          Current Outpatient Medications   Medication Sig Dispense Refill     acetaminophen (TYLENOL) 325 MG tablet Take 1-2 tablets by mouth every 6 hours as needed. As needed       CALCIUM + D OR petite 400/500 2 daily       furosemide (LASIX) 20 MG tablet Take 1 tablet (20 mg) by mouth daily 90 tablet 1     indomethacin (INDOCIN) 25 MG capsule Take 1 capsule (25 mg) by mouth 2 times daily (with meals) 30 capsule 0     levothyroxine (SYNTHROID/LEVOTHROID) 50 MCG tablet Take 1 tablet (50 mcg) by mouth daily 90 tablet 3     meclizine (ANTIVERT) 25 MG tablet Take 1 tablet (25 mg) by mouth 2 times daily 60 tablet 0     metoprolol tartrate (LOPRESSOR) 25 MG tablet Take 1 tablet (25 mg) by mouth 2 times daily 180 tablet 3     STATIN NOT  "PRESCRIBED, INTENTIONAL, by Other route continuous prn.  0     Allergies   Allergen Reactions     Alendronic Acid      Bone pain     Amlodipine Swelling     See late 2019 notes.      Amoxicillin Swelling     tongue     Atorvastatin Calcium      Muscle aches.  Has also tried muvacor, provachol, lopid, and crestor with same results     Lisinopril Cough     Prevacid [Aspartame] Diarrhea     Advil [Ibuprofen] Rash     Face swelling     Losartan Rash     Prednisone Swelling and Rash     Medrol dose pack     Recent Labs   Lab Test 04/26/21  0834 06/11/20  0814 04/02/19  1223 04/02/19  1223 03/08/17  1303 03/08/17  1303 03/18/16  1223 03/18/16  1223 03/16/15  1613   A1C 5.8* 6.0*  --  6.0*  --   --   --   --   --    *  --   --   --   --   --   --   --   --    HDL 45*  --   --   --   --   --   --   --   --    TRIG 292*  --   --   --   --   --   --   --   --    ALT  --   --   --   --   --  25  --  15 21   CR 1.35* 1.24*   < > 0.93   < > 0.96   < > 1.08* 0.78   GFRESTIMATED 37* 41*   < > 59*   < > 57*   < > 50* 72   GFRESTBLACK 43* 48*   < > 69   < > 68   < > 60* 88   POTASSIUM 4.5 3.9   < > 4.4   < > 4.9   < > 4.2 4.1   TSH 1.36 2.50  --  2.82   < > 2.32   < > 1.61 1.02    < > = values in this interval not displayed.           ROS:  Constitutional, HEENT, cardiovascular, pulmonary, gi and gu systems are negative, except as otherwise noted.   Due to advanced hearing loss, she is very isolated.     OBJECTIVE:   BP (!) 154/76   Pulse 72   Temp 98  F (36.7  C) (Tympanic)   Ht 1.626 m (5' 4\")   Wt 69.9 kg (154 lb)   BMI 26.43 kg/m   Estimated body mass index is 27.38 kg/m  as calculated from the following:    Height as of 6/22/20: 1.613 m (5' 3.5\").    Weight as of 6/22/20: 71.2 kg (157 lb).  EXAM:   GENERAL: healthy, alert and no distress  EYES: Eyes grossly normal to inspection, PERRL and conjunctivae and sclerae normal  HENT: cochlear implant, still very [place] and needs to read lips during speech, nose and mouth " without ulcers or lesions  NECK: no adenopathy, no asymmetry, masses, or scars and thyroid normal to palpation  RESP: lungs clear to auscultation - no rales, rhonchi or wheezes  BREAST: normal without masses, tenderness or nipple discharge and no palpable axillary masses or adenopathy  CV: regular rate and rhythm, normal S1 S2, no S3 or S4, soft, late 2-/6 systolic murmur, click or rub, no peripheral edema and peripheral pulses strong  ABDOMEN: soft, nontender, no hepatosplenomegaly, no masses and bowel sounds normal   (female): declined exam  MS: no gross musculoskeletal defects noted, no edema  SKIN: no suspicious lesions or rashes  NEURO: Normal strength and tone, mentation intact and speech normal  PSYCH: mentation appears normal, affect normal/bright    Diagnostic Test Results:  Labs reviewed in Epic    ASSESSMENT / PLAN:       ICD-10-CM    1. Encounter for general adult medical examination with abnormal findings  Z00.01    2. Chronic kidney disease, stage 3b  N18.32 Basic metabolic panel   3. Hypertension goal BP (blood pressure) < 140/90  I10 furosemide (LASIX) 20 MG tablet     Basic metabolic panel   4. Hypothyroidism due to acquired atrophy of thyroid  E03.4    5. Cochlear implant in place  Z96.21    6. Encounter for Medicare annual wellness exam  Z00.00    7. Need for hepatitis C screening test  Z11.59    8. Idiopathic gout of left foot, unspecified chronicity  M10.072 indomethacin (INDOCIN) 25 MG capsule   9. Hypothyroidism, unspecified type  E03.9 levothyroxine (SYNTHROID/LEVOTHROID) 50 MCG tablet   10. Meniere's disease, unspecified laterality  H81.09 meclizine (ANTIVERT) 25 MG tablet   11. Essential hypertension with goal blood pressure less than 140/90  I10 metoprolol tartrate (LOPRESSOR) 25 MG tablet    borderline normal, continue same medication.        Patient Instructions       Stop triamterene/hctz  Creatinine declining... consider nephrology consult if this does not help  Start Furosemide 20  "mg daily    Make Lab appointment in Late May:  Recheck electrolytes/kidney function (Creatinine)    Drink 1-2 L (a pitcher) of fluid per day, minimum.          FOR GOUT:  Use Indocin as needed for GOUT.    This will be very intermittent, has helped a lot in past.     For LOOSE STOOLS:  Try daily metamucil or other fiber supplement daily     Patient has been advised of split billing requirements and indicates understanding: Yes    COUNSELING:  Reviewed preventive health counseling, as reflected in patient instructions       Regular exercise    Estimated body mass index is 27.38 kg/m  as calculated from the following:    Height as of 6/22/20: 1.613 m (5' 3.5\").    Weight as of 6/22/20: 71.2 kg (157 lb).        She reports that she quit smoking about 17 years ago. Her smoking use included cigarettes. She started smoking about 67 years ago. She has a 50.00 pack-year smoking history. She has never used smokeless tobacco.    Appropriate preventive services were discussed with this patient, including applicable screening as appropriate for cardiovascular disease, diabetes, osteopenia/osteoporosis, and glaucoma.  As appropriate for age/gender, discussed screening for colorectal cancer, prostate cancer, breast cancer, and cervical cancer. Checklist reviewing preventive services available has been given to the patient.    Reviewed patients plan of care and provided an AVS. The Basic Care Plan (routine screening as documented in Health Maintenance) for Negin meets the Care Plan requirement. This Care Plan has been established and reviewed with the Patient.    Counseling Resources:  ATP IV Guidelines  Pooled Cohorts Equation Calculator  Breast Cancer Risk Calculator  BRCA-Related Cancer Risk Assessment: FHS-7 Tool  FRAX Risk Assessment  ICSI Preventive Guidelines  Dietary Guidelines for Americans, 2010  USDA's MyPlate  ASA Prophylaxis  Lung CA Screening    Hazel Young MD  [facility]  "

## 2021-04-29 NOTE — PATIENT INSTRUCTIONS
Patient Education   Personalized Prevention Plan  You are due for the preventive services outlined below.  Your care team is available to assist you in scheduling these services.  If you have already completed any of these items, please share that information with your care team to update in your medical record.  Health Maintenance Due   Topic Date Due     ANNUAL REVIEW OF HM ORDERS  Never done     Hepatitis C Screening  Never done     Diptheria Tetanus Pertussis (DTAP/TDAP/TD) Vaccine (1 - Tdap) Never done     Zoster (Shingles) Vaccine (1 of 2) Never done     Annual Wellness Visit  04/02/2020     FALL RISK ASSESSMENT  04/02/2020     PHQ-2  01/01/2021        Patient Education   Personalized Prevention Plan  You are due for the preventive services outlined below.  Your care team is available to assist you in scheduling these services.  If you have already completed any of these items, please share that information with your care team to update in your medical record.  Health Maintenance Due   Topic Date Due     ANNUAL REVIEW OF HM ORDERS  Never done     Hepatitis C Screening  Never done     Diptheria Tetanus Pertussis (DTAP/TDAP/TD) Vaccine (1 - Tdap) Never done     Zoster (Shingles) Vaccine (1 of 2) Never done     FALL RISK ASSESSMENT  04/02/2020           Stop triamterene/hctz  Start Furosemide 20 mg daily    Make Lab appointment in Late May:  Recheck electrolytes/kidney function (Creatinine)    Drink 1-2 L (a pitcher) of fluid per day, minimum.          FOR GOUT:  Use Indocin as needed for GOUT.      For LOOSE STOOLS:  Try daily metamucil or other fiber supplement daily

## 2021-04-30 NOTE — RESULT ENCOUNTER NOTE
Negin Magallon    As dicussed, the cholesterol is elevated.   We have been unable to treat with statin medications.     The kidney function is diminished (Creatinine a bit elevated).  As discussed, we will change the triamterene/hctz to lasix for BP control, will recheck the electrolytes/kidney function in a couple weeks.     Sincerely,       VIOLETTA BROWN M.D.

## 2021-05-20 DIAGNOSIS — I10 HYPERTENSION GOAL BP (BLOOD PRESSURE) < 140/90: ICD-10-CM

## 2021-05-20 DIAGNOSIS — N18.32 CHRONIC KIDNEY DISEASE, STAGE 3B (H): ICD-10-CM

## 2021-05-20 LAB
ANION GAP SERPL CALCULATED.3IONS-SCNC: 1 MMOL/L (ref 3–14)
BUN SERPL-MCNC: 28 MG/DL (ref 7–30)
CALCIUM SERPL-MCNC: 9.6 MG/DL (ref 8.5–10.1)
CHLORIDE SERPL-SCNC: 102 MMOL/L (ref 94–109)
CO2 SERPL-SCNC: 32 MMOL/L (ref 20–32)
CREAT SERPL-MCNC: 1.35 MG/DL (ref 0.52–1.04)
GFR SERPL CREATININE-BSD FRML MDRD: 37 ML/MIN/{1.73_M2}
GLUCOSE SERPL-MCNC: 115 MG/DL (ref 70–99)
POTASSIUM SERPL-SCNC: 4.1 MMOL/L (ref 3.4–5.3)
SODIUM SERPL-SCNC: 135 MMOL/L (ref 133–144)

## 2021-05-20 PROCEDURE — 36415 COLL VENOUS BLD VENIPUNCTURE: CPT | Performed by: INTERNAL MEDICINE

## 2021-05-20 PROCEDURE — 80048 BASIC METABOLIC PNL TOTAL CA: CPT | Performed by: INTERNAL MEDICINE

## 2021-05-21 DIAGNOSIS — N18.32 CHRONIC KIDNEY DISEASE, STAGE 3B (H): Primary | ICD-10-CM

## 2021-05-21 NOTE — RESULT ENCOUNTER NOTE
Negin Magallon    The kidney function remains stable.  No change either way.  I would like you to get a kidney ultrasound (Images of kidneys) prior to our appointment this summer.  This is not urgent, just sometime prior to your 7/29/21 appointment    Call to schedule imaging   - this can be done at our [place] or [place] locations     Sincerely,       VIOLETTA BROWN M.D.

## 2021-06-21 ENCOUNTER — ANCILLARY PROCEDURE (OUTPATIENT)
Dept: ULTRASOUND IMAGING | Facility: CLINIC | Age: 81
End: 2021-06-21
Attending: INTERNAL MEDICINE
Payer: MEDICARE

## 2021-06-21 DIAGNOSIS — N18.32 CHRONIC KIDNEY DISEASE, STAGE 3B (H): ICD-10-CM

## 2021-06-21 PROCEDURE — 76770 US EXAM ABDO BACK WALL COMP: CPT | Performed by: RADIOLOGY

## 2021-06-21 NOTE — RESULT ENCOUNTER NOTE
Negin Magallon    There is an abnormality on the L kidney which is hard to characterize with this imaging.  We could consider additional imaging.  I would prefer to discuss with you at our July visit.      Sincerely,       VIOLETTA BROWN M.D.

## 2021-07-29 ENCOUNTER — OFFICE VISIT (OUTPATIENT)
Dept: INTERNAL MEDICINE | Facility: CLINIC | Age: 81
End: 2021-07-29
Payer: MEDICARE

## 2021-07-29 VITALS
OXYGEN SATURATION: 97 % | TEMPERATURE: 97.5 F | DIASTOLIC BLOOD PRESSURE: 80 MMHG | BODY MASS INDEX: 26.26 KG/M2 | HEART RATE: 62 BPM | WEIGHT: 153 LBS | SYSTOLIC BLOOD PRESSURE: 150 MMHG

## 2021-07-29 DIAGNOSIS — N18.32 HYPERTENSIVE KIDNEY DISEASE WITH STAGE 3B CHRONIC KIDNEY DISEASE (H): Primary | ICD-10-CM

## 2021-07-29 DIAGNOSIS — Z96.21 COCHLEAR IMPLANT IN PLACE: ICD-10-CM

## 2021-07-29 DIAGNOSIS — I12.9 HYPERTENSIVE KIDNEY DISEASE WITH STAGE 3B CHRONIC KIDNEY DISEASE (H): Primary | ICD-10-CM

## 2021-07-29 LAB
ANION GAP SERPL CALCULATED.3IONS-SCNC: 3 MMOL/L (ref 3–14)
BUN SERPL-MCNC: 22 MG/DL (ref 7–30)
CALCIUM SERPL-MCNC: 9.5 MG/DL (ref 8.5–10.1)
CHLORIDE BLD-SCNC: 101 MMOL/L (ref 94–109)
CO2 SERPL-SCNC: 32 MMOL/L (ref 20–32)
CREAT SERPL-MCNC: 1.15 MG/DL (ref 0.52–1.04)
GFR SERPL CREATININE-BSD FRML MDRD: 45 ML/MIN/1.73M2
GLUCOSE BLD-MCNC: 106 MG/DL (ref 70–99)
POTASSIUM BLD-SCNC: 4.4 MMOL/L (ref 3.4–5.3)
SODIUM SERPL-SCNC: 136 MMOL/L (ref 133–144)

## 2021-07-29 PROCEDURE — 99214 OFFICE O/P EST MOD 30 MIN: CPT | Performed by: INTERNAL MEDICINE

## 2021-07-29 PROCEDURE — 36415 COLL VENOUS BLD VENIPUNCTURE: CPT | Performed by: INTERNAL MEDICINE

## 2021-07-29 PROCEDURE — 80048 BASIC METABOLIC PNL TOTAL CA: CPT | Performed by: INTERNAL MEDICINE

## 2021-07-29 NOTE — PATIENT INSTRUCTIONS
"Resume the one calcium daily - maybe stool pattern will improve a bit.     See our Ancillary [facility] for a quick Blood Pressure recheck (no cost) -- bring your cuff and ask to correlate.  \"I would like to make an ancillary visit for a blood pressure check and correlate my home blood pressure cuff\"      Return to clinic Fall for follow up blood pressure.    "

## 2021-07-29 NOTE — PROGRESS NOTES
"    Assessment & Plan     Hypertensive kidney disease with stage 3b chronic kidney disease  - Basic metabolic panel; Future  - Basic metabolic panel  Cochlear implant in place      Her creatinine has risen    Will recheck   She should recheck BP with ancillary and correlate BP cuff here.   There was some abnormality on Renal imaging, but unable to do MRI due to cochlear implant.   If her Creatinine is rising/not stable will have low threshhold for nephrology referral         I spent a total of 30 minutes on the day of the visit.   Time spent doing chart review, history and exam, documentation and further activities per the note             Return in about 4 months (around 11/29/2021) for Routine Visit, BP Recheck.    Hazel Young MD  [facility] [place]    =====================================================================  ====================================================================    Subjective   Negin is a 81 year old who presents for the following health issues      H/o cochlear implant (Meniere's dx), hypothyroid, djd, HTN (Metoprolol, furosemide, low sodium diet).  BP have been 110-140 systolic,  tolerating meds well ...   Has lost 3#.      However, creatinine slowly rising.  GFR was 60 in 2019, 37 now.      we d/c triam/hctz and changed to furosemide at last visit due to increasing creat.  6/21/21:  REnal US =small kidneys and dromnedary hump \"consider MRI\" -but she has cochlear implant...    Uses indomethacin only rarely for gout flare.     No ANJELICA since off of amlodipine.     Stressor =  may have dementia    Feet itch when she removes her shoes/socks at night.  Takes time to relax.      Stools still soft, metamucil did not help.   She has sudden attacks of loose stool, large amounts.   She had stopped her calcium supplements.      HPI     Hypertension Follow-up      Do you check your blood pressure regularly outside of the clinic? Yes     Are you following a low salt " diet? No    Are your blood pressures ever more than 140 on the top number (systolic) OR more   than 90 on the bottom number (diastolic), for example 140/90? Yes      How many servings of fruits and vegetables do you eat daily?  4 or more    On average, how many sweetened beverages do you drink each day (Examples: soda, juice, sweet tea, etc.  Do NOT count diet or artificially sweetened beverages)?   0    How many days per week do you exercise enough to make your heart beat faster? 3 or less    How many minutes a day do you exercise enough to make your heart beat faster? 9 or less    How many days per week do you miss taking your medication? 0    Home BP are 120-130 systolic  She is compliant with med.        Review of Systems         Objective    BP (!) 150/80 (BP Location: Right arm, Patient Position: Sitting, Cuff Size: Adult Regular)   Pulse 62   Temp 97.5  F (36.4  C) (Oral)   Wt 69.4 kg (153 lb)   SpO2 97%   BMI 26.26 kg/m    There is no height or weight on file to calculate BMI.     Physical Exam

## 2021-07-30 NOTE — RESULT ENCOUNTER NOTE
Negin Magallon    Electrolytes and kidney function have improved A LOT!   Great job with keeping up your hydration, your kidneys really like that.      See you in a few months, enjoy the rest of summer!    Sincerely,       VIOLETTA BROWN M.D.

## 2021-08-04 ENCOUNTER — NURSE TRIAGE (OUTPATIENT)
Dept: FAMILY MEDICINE | Facility: CLINIC | Age: 81
End: 2021-08-04

## 2021-08-04 ENCOUNTER — ALLIED HEALTH/NURSE VISIT (OUTPATIENT)
Dept: NURSING | Facility: CLINIC | Age: 81
End: 2021-08-04
Payer: MEDICARE

## 2021-08-04 ENCOUNTER — TELEPHONE (OUTPATIENT)
Dept: INTERNAL MEDICINE | Facility: CLINIC | Age: 81
End: 2021-08-04

## 2021-08-04 VITALS — DIASTOLIC BLOOD PRESSURE: 88 MMHG | SYSTOLIC BLOOD PRESSURE: 194 MMHG

## 2021-08-04 DIAGNOSIS — I10 HYPERTENSION GOAL BP (BLOOD PRESSURE) < 140/90: ICD-10-CM

## 2021-08-04 DIAGNOSIS — Z01.30 BP CHECK: Primary | ICD-10-CM

## 2021-08-04 RX ORDER — SPIRONOLACTONE 25 MG/1
12.5 TABLET ORAL DAILY
Qty: 15 TABLET | Refills: 3 | Status: SHIPPED | OUTPATIENT
Start: 2021-08-04 | End: 2021-08-05

## 2021-08-04 NOTE — TELEPHONE ENCOUNTER
Hazel Young MD  [facility] Rn Triage Pool  Please call patient: concern is uncontrolled HTN.   Would like to add spironolactone 12.5 mg daily and have her f/u with RN in 2 weeks for BP recheck AND labs

## 2021-08-04 NOTE — TELEPHONE ENCOUNTER
"Patient seen today for bp check on ancillary schedule. Clinic reading was 194/88 and patient triaged by RN. Patient had brought home blood pressure machine with. Reading was 173/80 with pulse 59 on home monitor. Rn took manual BP which was 180/72. Patient had taken readings at 1:15 this afternoon prior to appointment and they were 145/72 and 135/64. Patient wonders if salt from ham for lunch could be causing increased blood pressure. Patient denies chest pain, difficulty breathing, blurred vision. Gait is unsteady but this is baseline and she uses a cane.     Patient brought in log of readings going back to May, ranging from 118//70. Highest reading, 157/70, was yesterday 8/37/21.    RN huddled with pink team provider. OK  to send message to PCP to advise. Routing to Dr Young.     Patient would like [employer] Record[employer]art response as phone conversations are difficult for her to hear.         Reason for Disposition    Systolic BP >= 180 OR Diastolic >= 110    Answer Assessment - Initial Assessment Questions  1. BLOOD PRESSURE: \"What is the blood pressure?\" \"Did you take at least two measurements 5 minutes apart?\"      Yes: Clinic BP-194/88, home monitor 173/80, RN manual 180/72  2. ONSET: \"When did you take your blood pressure?\"      today  3. HOW: \"How did you obtain the blood pressure?\" (e.g., visiting nurse, automatic home BP monitor)      Clinic bp, home bp, manual  4. HISTORY: \"Do you have a history of high blood pressure?\"      yes  5. MEDICATIONS: \"Are you taking any medications for blood pressure?\" \"Have you missed any doses recently?\"      no  6. OTHER SYMPTOMS: \"Do you have any symptoms?\" (e.g., headache, chest pain, blurred vision, difficulty breathing, weakness)      none  7. PREGNANCY: \"Is there any chance you are pregnant?\" \"When was your last menstrual period?\"      no    Protocols used: HIGH BLOOD PRESSURE-A-OH      "

## 2021-08-04 NOTE — PROGRESS NOTES
Negin Magallon is a 81 year old patient who comes in today for a Blood Pressure check.  Initial BP:  BP (!) 194/88      Disposition: BP elevated.  Triage RN notified, patient asked to wait  Kristyn HOGUE CMA ([facility])

## 2021-08-04 NOTE — Clinical Note
Please call patient: concern is uncontrolled HTN.   Would like to add spironolactone 12.5 mg daily and have her f/u with RN in 2 weeks for BP recheck AND labs

## 2021-08-04 NOTE — TELEPHONE ENCOUNTER
Langtice message sent to pt for follow up and to inform of new medication prescription. Also scheduled pt for wed 8/18 labs and RN HTN f/u visit. Requested pt let us know if she is able to come to visits or if she needs to reschedule.    Please watch to make sure pt reviews message in Langtice.    Sienna COOMBS RN, BSN  [facility]

## 2021-08-05 NOTE — TELEPHONE ENCOUNTER
Pt sent R2G message to confirm scheduled appointments. No follow up needed.    Sienna COOMBS RN, BSN  [facility]

## 2021-08-18 ENCOUNTER — ALLIED HEALTH/NURSE VISIT (OUTPATIENT)
Dept: NURSING | Facility: CLINIC | Age: 81
End: 2021-08-18
Payer: MEDICARE

## 2021-08-18 ENCOUNTER — LAB (OUTPATIENT)
Dept: LAB | Facility: CLINIC | Age: 81
End: 2021-08-18

## 2021-08-18 VITALS — SYSTOLIC BLOOD PRESSURE: 172 MMHG | DIASTOLIC BLOOD PRESSURE: 77 MMHG

## 2021-08-18 DIAGNOSIS — Z01.30 BP CHECK: Primary | ICD-10-CM

## 2021-08-18 DIAGNOSIS — I10 HYPERTENSION GOAL BP (BLOOD PRESSURE) < 140/90: ICD-10-CM

## 2021-08-18 LAB
ANION GAP SERPL CALCULATED.3IONS-SCNC: 7 MMOL/L (ref 3–14)
BUN SERPL-MCNC: 25 MG/DL (ref 7–30)
CALCIUM SERPL-MCNC: 9.6 MG/DL (ref 8.5–10.1)
CHLORIDE BLD-SCNC: 98 MMOL/L (ref 94–109)
CO2 SERPL-SCNC: 27 MMOL/L (ref 20–32)
CREAT SERPL-MCNC: 1.3 MG/DL (ref 0.52–1.04)
GFR SERPL CREATININE-BSD FRML MDRD: 39 ML/MIN/1.73M2
GLUCOSE BLD-MCNC: 118 MG/DL (ref 70–99)
POTASSIUM BLD-SCNC: 4.7 MMOL/L (ref 3.4–5.3)
SODIUM SERPL-SCNC: 132 MMOL/L (ref 133–144)

## 2021-08-18 PROCEDURE — 36415 COLL VENOUS BLD VENIPUNCTURE: CPT

## 2021-08-18 PROCEDURE — 80048 BASIC METABOLIC PNL TOTAL CA: CPT

## 2021-08-18 NOTE — PROGRESS NOTES
"  RN, please respond with provider's recommendations by Roberta    Patient here for BP check. Initial readings with automatic machine were 182/77 and 172/77, five minutes apart. Manual reading 10 minutes later was 170/72. No headache, dizziness, change in vision, chest pain.     Patient started spironolactone 12.5 mg 8/5/21 No side effects noted other than a few pound weight loss. Has been monitoring bps at home. 8/6-8/9 bps range 163-157/69-76. 8/10 to 8/19 bps range from 129-144/63-74.     Patient notes added stress at home with both her and  being hard of hearing and  has onset of dementia.     She is also experiencing a gout flare currently, started one week ago.  Top of right foot and great toe is red and swollen. She is wearing her slippers because it is painful to wear shoes. She took her indomethacin Tues, Wed and Thurs but her chest started \"feeling funny\" and it didn't seem to be working. No difficulty breathing. She has been icing and soaking in lukewarm water and symptoms have improved some.     No RN hypertension referral    Please advise on BP and if anything else is recommended for gout.     Fany Montoya RN    "

## 2021-08-19 DIAGNOSIS — I10 HYPERTENSION GOAL BP (BLOOD PRESSURE) < 140/90: Primary | ICD-10-CM

## 2021-08-19 DIAGNOSIS — M10.072 IDIOPATHIC GOUT OF LEFT FOOT, UNSPECIFIED CHRONICITY: ICD-10-CM

## 2021-08-19 NOTE — TELEPHONE ENCOUNTER
Have her increase spironolactone to 12.5 mg bid   For gout, offer colchicine 0.5 o r 0.6 (dose per what is in epic) one twice daily as needed for gout, watch for diarrhea side effect,can be common     BMP and Urice acid in 2 weeks   RN BP follow up in 3 weeks     Please cue up orders thankyou    Orders pended, routing to provider to review    aFny Montoya RN

## 2021-08-20 RX ORDER — COLCHICINE 0.6 MG/1
0.6 TABLET ORAL DAILY
Qty: 30 TABLET | Refills: 1 | Status: SHIPPED | OUTPATIENT
Start: 2021-08-20 | End: 2022-05-23

## 2021-08-25 ENCOUNTER — MYC MEDICAL ADVICE (OUTPATIENT)
Dept: INTERNAL MEDICINE | Facility: CLINIC | Age: 81
End: 2021-08-25

## 2021-08-25 DIAGNOSIS — I10 HYPERTENSION GOAL BP (BLOOD PRESSURE) < 140/90: ICD-10-CM

## 2021-08-26 RX ORDER — SPIRONOLACTONE 25 MG/1
TABLET ORAL
Qty: 90 TABLET | Refills: 1 | Status: SHIPPED | OUTPATIENT
Start: 2021-08-26 | End: 2022-02-03

## 2021-09-01 ENCOUNTER — TELEPHONE (OUTPATIENT)
Dept: INTERNAL MEDICINE | Facility: CLINIC | Age: 81
End: 2021-09-01

## 2021-09-01 NOTE — TELEPHONE ENCOUNTER
"Dr. Young received note from patient with listing of her heart rate and blood pressures.  Per Dr. Young - \"please convert to telephone encounter\".    8-4, 6:30 pm heart rate 56, /68  8-5, 10:45 am pills nothing else heart rate 54, /79, after BP taken started new pill 2pm  8-6, 11:15 am - pills - breakfast (new pill first BP taken) /69, heart rate 53  8-7, 11:10 am heart rate 56, /72  8-8, 11:15 am heart rate 59, /72, 2nd heart rate 55, /73  8-9, 1 pm heart rate 56, /76, 2nd heart rate 56, /71  8-10, 11:55 am heart rate 56, /74  8-11, 12:45 pm heart rate 56, /65  8-12, 6:25 pm heart rate 52, /63  8-13, 12 pm heart rate 52, /66  8-14, 1:45 pm heart rate 54, /66  8-17, 12:50 pm heart rate 54, /68  8-18, 11:20 am heart rate 56, /68    Routing to Dr. Young to review and advise. Sigrid Rodriguez,           "

## 2021-09-02 ENCOUNTER — MYC MEDICAL ADVICE (OUTPATIENT)
Dept: FAMILY MEDICINE | Facility: CLINIC | Age: 81
End: 2021-09-02

## 2021-09-08 ENCOUNTER — LAB (OUTPATIENT)
Dept: LAB | Facility: CLINIC | Age: 81
End: 2021-09-08
Payer: MEDICARE

## 2021-09-08 DIAGNOSIS — M10.072 IDIOPATHIC GOUT OF LEFT FOOT, UNSPECIFIED CHRONICITY: ICD-10-CM

## 2021-09-08 DIAGNOSIS — I10 HYPERTENSION GOAL BP (BLOOD PRESSURE) < 140/90: ICD-10-CM

## 2021-09-08 LAB
ANION GAP SERPL CALCULATED.3IONS-SCNC: 7 MMOL/L (ref 3–14)
BUN SERPL-MCNC: 23 MG/DL (ref 7–30)
CALCIUM SERPL-MCNC: 9.5 MG/DL (ref 8.5–10.1)
CHLORIDE BLD-SCNC: 99 MMOL/L (ref 94–109)
CO2 SERPL-SCNC: 27 MMOL/L (ref 20–32)
CREAT SERPL-MCNC: 1.21 MG/DL (ref 0.52–1.04)
GFR SERPL CREATININE-BSD FRML MDRD: 43 ML/MIN/1.73M2
GLUCOSE BLD-MCNC: 150 MG/DL (ref 70–99)
POTASSIUM BLD-SCNC: 4.6 MMOL/L (ref 3.4–5.3)
SODIUM SERPL-SCNC: 133 MMOL/L (ref 133–144)
URATE SERPL-MCNC: 9 MG/DL (ref 2.6–6)

## 2021-09-08 PROCEDURE — 84550 ASSAY OF BLOOD/URIC ACID: CPT

## 2021-09-08 PROCEDURE — 80048 BASIC METABOLIC PNL TOTAL CA: CPT

## 2021-09-08 PROCEDURE — 36415 COLL VENOUS BLD VENIPUNCTURE: CPT

## 2021-09-08 NOTE — LETTER
September 9, 2021    Negin Magallon  [address] [id number]      Dear Negin:    We are writing to inform you of your test results.    Your electrolytes look great and the creatinine (kidney function) has improved.      Resulted Orders   Basic metabolic panel  (Ca, Cl, CO2, Creat, Gluc, K, Na, BUN)   Result Value Ref Range    Sodium 133 133 - 144 mmol/L    Potassium 4.6 3.4 - 5.3 mmol/L    Chloride 99 94 - 109 mmol/L    Carbon Dioxide (CO2) 27 20 - 32 mmol/L    Anion Gap 7 3 - 14 mmol/L    Urea Nitrogen 23 7 - 30 mg/dL    Creatinine 1.21 (H) 0.52 - 1.04 mg/dL    Calcium 9.5 8.5 - 10.1 mg/dL    Glucose 150 (H) 70 - 99 mg/dL    GFR Estimate 43 (L) >60 mL/min/1.73m2      Comment:      As of July 11, 2021, eGFR is calculated by the CKD-EPI creatinine equation, without race adjustment. eGFR can be influenced by muscle mass, exercise, and diet. The reported eGFR is an estimation only and is only applicable if the renal function is stable.   Uric acid   Result Value Ref Range    Uric Acid 9.0 (H) 2.6 - 6.0 mg/dL     If you have any questions or concerns, please call the clinic at the number listed above.     Sincerely,      Hazel Young MD/micah

## 2021-09-09 PROBLEM — R73.09 ELEVATED GLUCOSE: Status: ACTIVE | Noted: 2021-09-09

## 2021-09-09 NOTE — RESULT ENCOUNTER NOTE
Negin Magallon    Your electrolytes look great and the creatinine (kidney function) has improved.      The Uric Acid is elevated, we can discuss this at our 11/2021 visit.  This is a marker for gout risk.  You have colchicine to treat episodes.  There is also a medication one can take daily to prevent gout (it lowers the uric acid level) called Allopurinol.     Sincerely,       VIOLETTA BROWN M.D.

## 2021-09-13 ENCOUNTER — ALLIED HEALTH/NURSE VISIT (OUTPATIENT)
Dept: FAMILY MEDICINE | Facility: CLINIC | Age: 81
End: 2021-09-13
Payer: MEDICARE

## 2021-09-13 VITALS — SYSTOLIC BLOOD PRESSURE: 142 MMHG | DIASTOLIC BLOOD PRESSURE: 68 MMHG

## 2021-09-13 DIAGNOSIS — I10 HYPERTENSION GOAL BP (BLOOD PRESSURE) < 140/90: Primary | ICD-10-CM

## 2021-09-13 PROCEDURE — 99207 PR NO CHARGE NURSE ONLY: CPT

## 2021-09-13 NOTE — PROGRESS NOTES
Negin Magallon is a 81 year old patient who comes in today for a Blood Pressure check.  Initial BP:  BP (!) 142/68 (BP Location: Right arm, Patient Position: Chair, Cuff Size: Adult Regular)      Data Unavailable  Disposition: results routed to provider, schedule pt to come back in 3 weeks for another BP check and pt is already scheduled to see Dr. Young in November.    Luda Mercedes MA

## 2021-09-26 ENCOUNTER — HEALTH MAINTENANCE LETTER (OUTPATIENT)
Age: 81
End: 2021-09-26

## 2021-09-30 ENCOUNTER — IMMUNIZATION (OUTPATIENT)
Dept: FAMILY MEDICINE | Facility: CLINIC | Age: 81
End: 2021-09-30
Payer: MEDICARE

## 2021-09-30 DIAGNOSIS — Z23 NEED FOR PROPHYLACTIC VACCINATION AND INOCULATION AGAINST INFLUENZA: Primary | ICD-10-CM

## 2021-09-30 PROCEDURE — 99207 PR NO CHARGE NURSE ONLY: CPT

## 2021-09-30 PROCEDURE — 90662 IIV NO PRSV INCREASED AG IM: CPT

## 2021-09-30 PROCEDURE — G0008 ADMIN INFLUENZA VIRUS VAC: HCPCS

## 2021-10-26 DIAGNOSIS — Z96.21 COCHLEAR IMPLANT IN PLACE: Primary | ICD-10-CM

## 2021-11-03 NOTE — PROGRESS NOTES
AUDIOLOGY REPORT    BACKGROUND INFORMATION: Dr. Luis Dillon implanted Negin Magallon with a right  Nucleus  cochlear implant 08/2/2010 but had device failure, was explanted and reimplanted on 06/05/2011 with the same  device, as the recall was in September 2011.  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  She is being seen 11/8/2021 in Audiology at the [facility] for follow up programming with 910 processor.  She was accompanied by her  Alvarado.       The patient was previously followed by Annette Lubin in Audiology, who is now retired.  This is my first meeting with the patient.      PATIENT REPORT: Patient would like programming adjustments in her cochlear implant.  She has been wearing map #49 for the last few years but wants to go back to the program she had before that (map #47).      Last week, she started the process to upgrade her processor so she would like to set up a fitting (in 8 weeks to allow time for insurance authorization).  Speech perception testing will be completed after adjustment to the new processor.       FITTING SESSION: Dr. Jade Chopra ordered today's appointment. The patient came to the clinic for adjustment to the programs in the external speech processor and for assessment of the external components of the cochlear implant system. These components provide power and data to the internal device. Sound is only heard once the external portion is activated. Postoperative treatment, including device fitting and adjustment, audiologic assessments, and training are required at regular intervals.  Programming included psychophysical measures of comfort, threshold, and loudness balance,  "and adjustments of frequency tables and electrodes.    Processor type: 910 and 810 - Did not bring 810 today   Headpiece type: Standard   Magnet strength: 2 - no irritation or discomfort    TEST RESULTS:    Electrode Impedances: Stable and normal   Neural Response Testing: DNT  Facial Stimulation: Absent  Tinnitus: Present  Balance Problems: Occasional from the Menieres disease, and she fell because of it.  She is in physical therapy  Pain/Discomfort: None  Strategies Tried:     Programs 910 Processor:   1. 47 HOME Adro, ASC \"Less 60 to be less aggressive\" No noise  2. 47  HOME Adro, ASC \"Less 60 to be less aggressive\" with noise reduction  3. 47 GROUP: Fixed directional, Autosensitivity; Adro, Noise  4. 47 CAFE: Adaptive directional, Autosensitivity; Adro; Noise  Number of Channels per Program: 2-22 enabled.    Programs: 810 processor (volume 5; sensitivity 10) (Not changed today)   1. 48 new map, with standard FAT 21 (188-7938); Everyday (Adro ) 60 ASC  2. 48 new map, with standard FAT 21 ([id number]); Everyday (Adro with Autosensitivity) 60 ASC  3. 48 Noise  Zoom, Autosensitivity; Adro; 57 ASC  4. 48 Focus Beam, Autosensitivity, Adro; 57 ASC    COMMENTS:  Patient listened to map 47 and preferred it over 49.  She generally uses slots 1 and 3.  She would like program 2 to be changed to a copy of slot 1 but with noise reduction added.      SUMMARY AND RECOMMENDATIONS: Ms Magallon was seen for follow-up programming with her 910 processors.  Since she returned to an old map in all programs and noise reduction was added in program 2.  She will return in about 8 weeks for fitting of her upgrade and about 1 month after the fitting for follow up and speech perception testing.      The patient expressed understanding and agreement with this plan.    Betzy Hawkins, CCC-A, [facility]  Licensed Audiologist  [place] #[id number]      "

## 2021-11-08 ENCOUNTER — TELEPHONE (OUTPATIENT)
Dept: AUDIOLOGY | Facility: CLINIC | Age: 81
End: 2021-11-08

## 2021-11-08 ENCOUNTER — OFFICE VISIT (OUTPATIENT)
Dept: AUDIOLOGY | Facility: CLINIC | Age: 81
End: 2021-11-08
Payer: MEDICARE

## 2021-11-08 DIAGNOSIS — H90.3 SENSORY HEARING LOSS, BILATERAL: Primary | ICD-10-CM

## 2021-11-08 DIAGNOSIS — Z96.21 COCHLEAR IMPLANT IN PLACE: ICD-10-CM

## 2021-11-08 NOTE — TELEPHONE ENCOUNTER
Letter of medical necessity for cochlear implant sound processor upgrade sent to Dr. Chopra for signature via [employer].  Once signed, it will electronically route to [employer], who will assist with insurance authorization.      Betzy Hawkins, CCC-A, [facility]  Licensed Audiologist  [place] #[id number]

## 2021-11-08 NOTE — TELEPHONE ENCOUNTER
LVM for patient regarding updating MSPQ Questions in Medicare with possible snf dates for patient and spouse. Provided direct number regarding questions.

## 2021-11-09 ENCOUNTER — TELEPHONE (OUTPATIENT)
Dept: AUDIOLOGY | Facility: CLINIC | Age: 81
End: 2021-11-09
Payer: MEDICARE

## 2021-11-09 NOTE — TELEPHONE ENCOUNTER
Patient's spouse returned VM regarding updating MSPQ Questions in Medicare with possible prison dates for patient and spouse. Provided all missing information needed.-Per Patient's spouse

## 2021-11-14 NOTE — PROGRESS NOTES
AUDIOLOGY REPORT    BACKGROUND INFORMATION: Dr. Luis Dillon implanted Negin Magallon with a right  Nucleus  cochlear implant 08/2/2010 but had device failure, was explanted and reimplanted on 06/05/2011 with the same  device, as the recall was in September 2011.  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  She is being seen 11/16/2021 in Audiology at the [facility] for cochlear implant equipment upgrade and follow up programming.  She was accompanied by her  Alvarado.       PATIENT REPORT: Patient was last seen 11/8/21 for programming.  She reports the changes made have helped her hear better when water is running.  Since that visit she has received her N7 upgrade kit.  We were originally planning on fitting this in December but she received it earlier than expected and so the fitting was moved up to today.  She reports she would like to keep the December visit for follow up and testing.  She would also like to keep the original follow up planned for January in case she needs it.  If not needed, it will be canceled after the December appointment.        FITTING SESSION: Dr. Jade Chopra ordered today's appointment. The patient came to the clinic for adjustment to the programs in the external speech processor and for assessment of the external components of the cochlear implant system. These components provide power and data to the internal device. Sound is only heard once the external portion is activated. Postoperative treatment, including device fitting and adjustment, audiologic assessments, and training are required at regular intervals.  Programming included psychophysical measures of comfort, threshold, and  "loudness balance, and adjustments of frequency tables and electrodes.    Processor type: N7 fit today; N6 and N5 backups.     Headpiece type: Standard   Magnet strength: 1 in N7 (came with 2 which was too strong; gave patient 1 from clinic stock and submitted RMA); 2 in N6 and N5 - no irritation or discomfort    TEST RESULTS:    Electrode Impedances: Stable and normal   Neural Response Testing: DNT  Facial Stimulation: Absent  Tinnitus: Present  Balance Problems: Occasional from the Menieres disease, and she fell because of it.  She is in physical therapy  Pain/Discomfort: None  Strategies Tried:     Programs in N7 processor:  1. 50 HOME Adro, ASC \"Less 60 to be less aggressive\" No noise  2. 50 HOME Adro, ASC \"Less 60 to be less aggressive\" with noise reduction  3. 50 CAFE: Fixed directional, Autosensitivity; Adro, Noise  4. 50 GROUPS: Adaptive directional, Autosensitivity; Adro; Noise    Programs in N6 processor:   1. 47 Adro, ASC \"Less 60 to be less aggressive\" No noise  2. 47 Adro, ASC \"Less 60 to be less aggressive\" with noise reduction  3. 47 Fixed directional, Autosensitivity; Adro, Noise - Dr. Colbert labeled this Groups while it is Cafe in N7.  Left label to avoid confusing patient.    4. 47 Adaptive directional, Autosensitivity; Adro; Noise - Dr. Colbert labeled this Cafe while it is Groups in N7.  Left label to avoid confusing patient.     Programs in N5 processor (volume 5; sensitivity 10) (Not changed today)   1. 48 new map, with standard FAT 21 ([id number]); Everyday (Adro ) 60 ASC  2. 48 new map, with standard FAT 21 ([id number]); Everyday (Adro with Autosensitivity) 60 ASC  3. 48 Noise  Zoom, Autosensitivity; Adro; 57 ASC  4. 48 Focus Beam, Autosensitivity, Adro; 57 ASC    Number of Channels per Program: 2-22 enabled.    COMMENTS:  Preferred program 47 from the N6 was converted to the N7.  Patient reported was comfortable.  This was saved as map 50.    All of the equipment in the processor kit " was reviewed including the remote control.  The patient does not carry a cell phone.  The patient was given time to practice use of the device and placement/removal of processor.  The patient's questions were answered.  Warranties were reviewed.    SUMMARY AND RECOMMENDATIONS: Negin Magallon was seen for follow-up programming and equipment upgrade to the N7 sound processor.  She will return in 6 weeks (late December) for follow up and speech perception testing.  She has elected to keep the January 2022 appointment for now, and will cancel it closer to that time if it isn't needed.     The patient expressed understanding and agreement with this plan.    Betzy Hawkins, CCC-A, [facility]  Licensed Audiologist  [place] #[id number]

## 2021-11-16 ENCOUNTER — OFFICE VISIT (OUTPATIENT)
Dept: AUDIOLOGY | Facility: CLINIC | Age: 81
End: 2021-11-16
Payer: MEDICARE

## 2021-11-16 DIAGNOSIS — H90.3 SENSORY HEARING LOSS, BILATERAL: Primary | ICD-10-CM

## 2021-11-29 ENCOUNTER — OFFICE VISIT (OUTPATIENT)
Dept: INTERNAL MEDICINE | Facility: CLINIC | Age: 81
End: 2021-11-29
Payer: MEDICARE

## 2021-11-29 VITALS
OXYGEN SATURATION: 96 % | DIASTOLIC BLOOD PRESSURE: 81 MMHG | HEART RATE: 61 BPM | WEIGHT: 153 LBS | SYSTOLIC BLOOD PRESSURE: 166 MMHG | BODY MASS INDEX: 26.26 KG/M2 | TEMPERATURE: 97.6 F

## 2021-11-29 DIAGNOSIS — E03.9 HYPOTHYROIDISM, UNSPECIFIED TYPE: ICD-10-CM

## 2021-11-29 DIAGNOSIS — E79.0 ELEVATED BLOOD URIC ACID LEVEL: ICD-10-CM

## 2021-11-29 DIAGNOSIS — Z11.59 NEED FOR HEPATITIS C SCREENING TEST: ICD-10-CM

## 2021-11-29 DIAGNOSIS — E78.5 HYPERLIPIDEMIA LDL GOAL <130: ICD-10-CM

## 2021-11-29 DIAGNOSIS — I12.9 HYPERTENSIVE KIDNEY DISEASE WITH STAGE 3B CHRONIC KIDNEY DISEASE (H): ICD-10-CM

## 2021-11-29 DIAGNOSIS — R73.9 BLOOD GLUCOSE ELEVATED: ICD-10-CM

## 2021-11-29 DIAGNOSIS — I10 HYPERTENSION GOAL BP (BLOOD PRESSURE) < 140/90: Primary | ICD-10-CM

## 2021-11-29 DIAGNOSIS — N18.32 HYPERTENSIVE KIDNEY DISEASE WITH STAGE 3B CHRONIC KIDNEY DISEASE (H): ICD-10-CM

## 2021-11-29 DIAGNOSIS — N18.31 STAGE 3A CHRONIC KIDNEY DISEASE (H): ICD-10-CM

## 2021-11-29 DIAGNOSIS — Z96.21 COCHLEAR IMPLANT IN PLACE: ICD-10-CM

## 2021-11-29 PROCEDURE — 99213 OFFICE O/P EST LOW 20 MIN: CPT | Performed by: INTERNAL MEDICINE

## 2021-11-29 NOTE — PROGRESS NOTES
"  Assessment & Plan     Hypertension goal BP (blood pressure) < 140/90  Controlled.  Continue current management   - Basic metabolic panel; Future    Hypertensive kidney disease with stage 3b chronic kidney disease (H)   stable     Cochlear implant in place  New implant two weeks ago  She is VERY pleased.      Elevated blood uric acid level   discussed  Declines allopurinol     Stage 3a chronic kidney disease (H)   future labs  - Albumin Random Urine Quantitative with Creat Ratio; Future  - CBC with platelets; Future    Blood glucose elevated   future labs  - TSH with free T4 reflex; Future  - Hemoglobin A1c; Future    Hyperlipidemia LDL goal <130  futrure labs   - Lipid panel reflex to direct LDL Fasting; Future    Hypothyroidism, unspecified type   future labs   - TSH with free T4 reflex; Future    Need for hepatitis C screening test  Future labs.  - Hepatitis C Screen Reflex to HCV RNA Quant and Genotype; Future      I spent a total of 20 minutes on the day of the visit.   Time spent doing chart review, history and exam, documentation and further activities per the note        BMI:   Estimated body mass index is 26.26 kg/m  as calculated from the following:    Height as of 4/29/21: 1.626 m (5' 4\").    Weight as of this encounter: 69.4 kg (153 lb).            Return in about 5 months (around 4/29/2022).    Hazel Young MD  [facility] JOHN Kam is a 81 year old who presents for the following health issues     HPI     80 y/o F here for BP f/u.  H/o cochlear implant (Meniere's dx), hypothyroid, djd, HTN (Metoprolol, furosemide, low sodium diet).      HOme BP are 125-140 systolic  9/9/21: elevated uric acid, start allopurinol??  Stressor =  may have dementia.      Hypertension Follow-up      Do you check your blood pressure regularly outside of the clinic? No     Are you following a low salt diet? No    Are your blood pressures ever more than 140 on the top number " (systolic) OR more   than 90 on the bottom number (diastolic), for example 140/90? Yes      How many servings of fruits and vegetables do you eat daily?  2-3    On average, how many sweetened beverages do you drink each day (Examples: soda, juice, sweet tea, etc.  Do NOT count diet or artificially sweetened beverages)?   0    How many days per week do you exercise enough to make your heart beat faster? 3 or less    How many minutes a day do you exercise enough to make your heart beat faster? 9 or less    How many days per week do you miss taking your medication? 0        Review of Systems   Constitutional, HEENT, cardiovascular, pulmonary, gi and gu systems are negative, except as otherwise noted.      Objective    BP (!) 166/81 (BP Location: Right arm, Patient Position: Sitting, Cuff Size: Adult Large)   Pulse 61   Temp 97.6  F (36.4  C) (Oral)   Wt 69.4 kg (153 lb)   SpO2 96%   BMI 26.26 kg/m    Body mass index is 26.26 kg/m .     Physical Exam        GENERAL: healthy, alert and no distress  HENT: new cochlear implant.   NECK: no adenopathy, no asymmetry, masses, or scars and thyroid normal to palpation  MS: no gross musculoskeletal defects noted, no edema  PSYCH: mentation appears normal, affect normal/bright    Lab on 09/08/2021   Component Date Value Ref Range Status     Sodium 09/08/2021 133  133 - 144 mmol/L Final     Potassium 09/08/2021 4.6  3.4 - 5.3 mmol/L Final     Chloride 09/08/2021 99  94 - 109 mmol/L Final     Carbon Dioxide (CO2) 09/08/2021 27  20 - 32 mmol/L Final     Anion Gap 09/08/2021 7  3 - 14 mmol/L Final     Urea Nitrogen 09/08/2021 23  7 - 30 mg/dL Final     Creatinine 09/08/2021 1.21* 0.52 - 1.04 mg/dL Final     Calcium 09/08/2021 9.5  8.5 - 10.1 mg/dL Final     Glucose 09/08/2021 150* 70 - 99 mg/dL Final     GFR Estimate 09/08/2021 43* >60 mL/min/1.73m2 Final    As of July 11, 2021, eGFR is calculated by the CKD-EPI creatinine equation, without race adjustment. eGFR can be influenced  by muscle mass, exercise, and diet. The reported eGFR is an estimation only and is only applicable if the renal function is stable.     Uric Acid 09/08/2021 9.0* 2.6 - 6.0 mg/dL Final

## 2021-12-16 NOTE — PROGRESS NOTES
AUDIOLOGY REPORT    BACKGROUND INFORMATION: Dr. Luis Dillon implanted Negin Magallon with a right  Nucleus  cochlear implant 08/2/2010 but had device failure, was explanted and reimplanted on 06/05/2011 with the same  device, as the recall was in September 2011.  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  She is being seen 12/29/2021 in Audiology at the [facility] for cochlear implant follow up and testing.  She was accompanied by her  Alvarado.  Today's visit was ordered by Jade Chopra MD.      PATIENT REPORT: Patient was last seen 11/16/21 for upgrade to the N7 processor.  She reports she finds the sound of the new processor and the fit of the new processor very comfortable.        TEST RESULTS:   Otoscopy: Right cerumen impaction, Left canal clear of cerumen    Tympanograms: Shallow bilaterally (consistent with past recordings) without otologic symptoms; Right recording may be affected by cerumen.  Unaided: Did not test due to cerumen in right ear.  Pre-operative testing showed profound sensorineural hearing loss in both ears.      35 minutes were spent assessing the patient s auditory rehabilitation status.      Device(s) used for Testing: N7 - #1 magnet without irritation or discomfort; Will have Cochlear send patient #1/2 to try since #1 is very secure.     Soundfield Thresholds: Detection in normal to mild hearing loss range - stable     CNC Words Test:  The patient repeats 25 single syllable words, auditory only. The words are presented at 60 dB A (conversational level) delivered from a CD player.    Preoperative Performance:  Right ear aided: 0%    3 months Post-Activation of CI: Right: 62 % (52 % with the  previous device)    6 months Post-Activation:  68 %    12 Months Post-Activation: 64 %    5 Years Post-Activation:  60% new; and 40% previous map    ~6 years Post-Activation: 60%    10.5 years Post-Activation (Today): 72% - slightly improved      The Hearing in Noise Test (HINT):  The patient repeats 20 sentences, auditory only.   The sentences are presented in each condition at 60 dB A(conversational level)delivered from a CD player.    Preoperative Performance:  Preoperative Performance:  Right ear aided: 0%    3 months Post-Activation of CI: Right: 89 % quiet  67 % in S/N 10 (41 % with the previous device)    6 months Post-Activation:  DNT    12 Months Post-Activation: 88 %    5 Years:  DNT    ~6 years Post-Activation: DNT     10.5 years Post-Activation (Today): 86% - stable    [employer] Sentences Test:  The patient repeats 20 sentences, auditory only.  The sentences are presented at 60 dB A (conversational level) delivered from a CD player.    Preoperative Performance:  Right ear aided: DNT  3 months Post-Activation of CI: Right: 71 %    6 months Post-Activation:  82 %    12 Months Post-Activation:78 % quiet; 57 % in noise (signal/noise 10)    5 Years Post-Activation:  77% new and 60% previous    ~6 years Post-Activation: 59%  This examiner is not sure why the decrease but it may be due to the change today.    10.5 years Post-Activation (Today): 58%; Stable compared to last testing.  Likely affected by processing speed since patient performs better on single words.         FITTING SESSION: Dr. Jade Chopra ordered today's appointment. The patient came to the clinic for adjustment to the programs in the external speech processor and for assessment of the external components of the cochlear implant system. These components provide power and data to the internal device. Sound is only heard once the external portion is activated. Postoperative treatment, including device fitting and adjustment, audiologic assessments, and  "training are required at regular intervals.  Programming included psychophysical measures of comfort, threshold, and loudness balance, and adjustments of frequency tables and electrodes.    Processor type: N7; N6 and N5 backups.     Headpiece type: Standard   Magnet strength: 1 in N7 (Cochlear will send patient #1/2 to try); 2 in N6 and N5 - no irritation or discomfort    TEST RESULTS:    Electrode Impedances: Stable and normal   Neural Response Testing: DNT  Facial Stimulation: Absent  Tinnitus: Present  Balance Problems: Occasional from the Menieres disease, and she fell because of it.  She is in physical therapy  Pain/Discomfort: None  Strategies Tried:     Programs in N7 processor: Not changed today   1. 50 HOME ADRO, ASC \"Less 60 to be less aggressive\" No noise  2. 50 HOME ADRO, ASC \"Less 60 to be less aggressive\" with noise reduction  3. 50 CAFE: Fixed directional, Autosensitivity; ADRO, Noise  4. 50 GROUPS: Adaptive directional, Autosensitivity; ADRO; Noise    Programs in N6 processor:   1. 51 ADRO, ASC \"Less 60 to be less aggressive\" No noise  2. 51 ADRO, ASC \"Less 60 to be less aggressive\" with noise reduction  3. 51 Fixed directional, Autosensitivity; ADRO, Noise - Dr. Colbert labeled this Groups while it is Cafe in N7.  Left label to avoid confusing patient.    4. 51 Adaptive directional, Autosensitivity; ADRO; Noise - Dr. Colbert labeled this Cafe while it is Groups in N7.  Left label to avoid confusing patient.     Programs in N5 processor (volume 5; sensitivity 10): Not changed today   1. 48 new map, with standard FAT 21 ([id number]); Everyday (Adro ) 60 ASC  2. 48 new map, with standard FAT 21 ([id number]); Everyday (Adro with Autosensitivity) 60 ASC  3. 48 Noise  Zoom, Autosensitivity; Adro; 57 ASC  4. 48 Focus Beam, Autosensitivity, Adro; 57 ASC    Number of Channels per Program: 2-22 enabled.    COMMENTS:  Impedances were stable and normal.  Patient asked that N6 processor be reprogrammed to " match N7 since sensitivity in N6 defaulted to 12 and in N7 it is 10.  Map 50 from N7 was converted to N6 (map 51).  Sensitivity was set to 10 in all programs and programs were downloaded to N6.  Since a full reprogramming session was not completed, programming charges were modified.       SUMMARY AND RECOMMENDATIONS: Ms Magallon has been using her N7 processor for 6 weeks.  N6 processor was reset to match N7 settings.  Audibility with N7 is good and stable compared to last testing.  Speech perception testing scores are stable.  Testing should be repeated in 1-2 years or sooner if changes are noted.  Patient should return sooner as needed for programming.  Because patient's appointments were moved up to November and December (she received equipment early) and she is doing well, she elected to cancel the January 2022 visit.      Tympanograms were shallow bilaterally (consistent with past recordings) without otologic symptoms.  Patient has cerumen impaction in the right ear.  She reports she will have an ear cleaning at [facility] in [place].  Middle ear status can be assessed in ENT at that time.      The patient expressed understanding and agreement with this plan.    Betzy Hawkins, CCC-A, [facility]  Licensed Audiologist  [place] #[id number]    Enclosure: audiogram

## 2021-12-29 ENCOUNTER — OFFICE VISIT (OUTPATIENT)
Dept: AUDIOLOGY | Facility: CLINIC | Age: 81
End: 2021-12-29
Payer: MEDICARE

## 2021-12-29 DIAGNOSIS — H90.3 SENSORY HEARING LOSS, BILATERAL: Primary | ICD-10-CM

## 2021-12-29 PROCEDURE — 92626 EVAL AUD FUNCJ 1ST HOUR: CPT | Mod: 59 | Performed by: AUDIOLOGIST-HEARING AID FITTER

## 2021-12-29 PROCEDURE — 92604 REPROGRAM COCHLEAR IMPLT 7/>: CPT | Mod: 52 | Performed by: AUDIOLOGIST-HEARING AID FITTER

## 2021-12-29 PROCEDURE — 92567 TYMPANOMETRY: CPT | Mod: 59 | Performed by: AUDIOLOGIST-HEARING AID FITTER

## 2022-01-18 NOTE — PROGRESS NOTES
History of Present Illness - Negin Magallon is a very pleasant 81 year old female here to see me for the first time for ear check.  But she has a long history of ear disease and was a long time patient of the [facility] group.      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  She had a cochlear implant placed in August 2010 and replaced in 2011, and the processor was upgraded in November of 2021.      At that point her LEFT canal appeared clear, and the RIGHT canal was severely impacted with cerumen. She was sent to me for cerumen removal.      Past Medical History -   Patient Active Problem List   Diagnosis     Meniere's disease     Cochlear implant in place     Hyperlipidemia LDL goal <130     Hypertension goal BP (blood pressure) < 140/90     DJD (degenerative joint disease)     Gout     Shoulder impingement     Adhesive capsulitis     Hypothyroidism due to acquired atrophy of thyroid     Chronic kidney disease, stage 3 (H)     Elevated glucose       Current Medications -   Current Outpatient Medications:      acetaminophen (TYLENOL) 325 MG tablet, Take 1-2 tablets by mouth every 6 hours as needed. As needed, Disp: , Rfl:      CALCIUM + D OR, 1 DAILY, Disp: , Rfl:      colchicine (COLCYRS) 0.6 MG tablet, Take 1 tablet (0.6 mg) by mouth daily For 2 weeks, then daily as needed for gout (Patient not taking: Reported on 11/29/2021), Disp: 30 tablet, Rfl: 1     furosemide (LASIX) 20 MG tablet, TAKE 1 TABLET(20 MG) BY MOUTH DAILY, Disp: 90 tablet, Rfl: 1     indomethacin (INDOCIN) 25 MG capsule, Take 1 capsule (25 mg) by mouth 2 times daily (with meals) (Patient not taking: Reported on 11/29/2021), Disp: 30 capsule, Rfl: 0     levothyroxine (SYNTHROID/LEVOTHROID) 50 MCG tablet, Take 1 tablet (50 mcg) by mouth daily, Disp: 90 tablet, Rfl: 3     meclizine (ANTIVERT) 25 MG tablet, Take 1 tablet (25 mg) by mouth 2 times daily, Disp: 60 tablet, Rfl: 0      metoprolol tartrate (LOPRESSOR) 25 MG tablet, Take 1 tablet (25 mg) by mouth 2 times daily, Disp: 180 tablet, Rfl: 3     spironolactone (ALDACTONE) 25 MG tablet, TAKE 1 TABLET (25 MG) BY MOUTH DAILY, Disp: 90 tablet, Rfl: 1     STATIN NOT PRESCRIBED, INTENTIONAL,, by Other route continuous prn., Disp: , Rfl: 0    Allergies -   Allergies   Allergen Reactions     Alendronic Acid      Bone pain     Amlodipine Swelling     See late 2019 notes.      Amoxicillin Swelling     tongue     Lipitor [Atorvastatin Calcium]      Muscle aches.  Has also tried muvacor, provachol, lopid, and crestor with same results     Lisinopril Cough     Prevacid [Aspartame] Diarrhea     Advil [Ibuprofen] Rash     Face swelling     Losartan Rash     Prednisone Swelling and Rash     Medrol dose pack       Social History -   Social History     Socioeconomic History     Marital status:      Spouse name: Not on file     Number of children: Not on file     Years of education: Not on file     Highest education level: Not on file   Occupational History     Not on file   Tobacco Use     Smoking status: Former Smoker     Packs/day: 1.00     Years: 50.00     Pack years: 50.00     Types: Cigarettes     Start date: 3/14/1954     Quit date: 3/14/2004     Years since quittin.8     Smokeless tobacco: Never Used   Substance and Sexual Activity     Alcohol use: No     Comment: rarely,socially/1/2 months     Drug use: No     Sexual activity: Not Currently     Partners: Male   Other Topics Concern     Parent/sibling w/ CABG, MI or angioplasty before 65F 55M? No   Social History Narrative    Lives in home in one story house with  (he has CAD).   No children     Social Determinants of Health     Financial Resource Strain: Not on file   Food Insecurity: Not on file   Transportation Needs: Not on file   Physical Activity: Not on file   Stress: Not on file   Social Connections: Not on file   Intimate Partner Violence: Not on file   Housing  Stability: Not on file       Family History -   Family History   Problem Relation Age of Onset     Diabetes Father        Review of Systems - As per HPI and PMHx, otherwise 10+ system review of the head and neck, and general constitution is negative.    Physical Exam  BP (!) 155/79 (BP Location: Right arm, Patient Position: Sitting, Cuff Size: Adult Regular)   Pulse 65   Wt 69.9 kg (154 lb)   SpO2 100%   BMI 26.43 kg/m      General - The patient is well nourished and well developed, and appears to have good nutritional status.  Alert and oriented to person and place, answers questions and cooperates with examination appropriately.   Head and Face - Normocephalic and atraumatic, with no gross asymmetry noted of the contour of the facial features.  The facial nerve is intact, with strong symmetric movements.  Voice and Breathing - The patient was breathing comfortably without the use of accessory muscles. There was no wheezing, stridor, or stertor.  The patients voice was clear and strong, and had appropriate pitch and quality.  Eyes - Extraocular movements intact, and the pupils were reactive to light.  Sclera were not icteric or injected, conjunctiva were pink and moist.      Cerumen Removal    Physical Exam and Procedure  Ears - On examination of the ears, I found that the RIGHT  side had cerumen impaction.  Therefore, I positioned them in the examination chair in a semi-supine position, beginning with the right side.  I used the binocular surgical microscope to perform cerumen removal.  I began by using a cerumen loop to gently lift the edges of the cerumen mass away from the walls of the external canal.  Once I did this, I was able to suction away fragments of wax and debris using suction.  Once the mass was loose enough, the entire plug was pulled from the canal.  The tympanic membrane was intact, no sign of perforation or middle ear effusion.    The LEFT canal was clear and healthy, and the LEFT tympanic  membrane normal.      The RIGHT CI site is clean, dry, and healthy appearing.      A/P - Negin Magallon is a 81 year old female  (Z96.21) Cochlear implant in place  (primary encounter diagnosis)  (H61.21) Impacted cerumen of right ear    The patient has had their cerumen procedurally removed today.  I have discussed ear care at home, including avoiding qtips or any other object placed in the canal.  I have also discussed that over the counter cerumen kits like Debrox or Cerumenex can be useful.    If no other issues, follow up with me in one year for an ear check.

## 2022-01-24 ENCOUNTER — OFFICE VISIT (OUTPATIENT)
Dept: OTOLARYNGOLOGY | Facility: CLINIC | Age: 82
End: 2022-01-24
Payer: MEDICARE

## 2022-01-24 VITALS
OXYGEN SATURATION: 100 % | DIASTOLIC BLOOD PRESSURE: 79 MMHG | BODY MASS INDEX: 26.43 KG/M2 | WEIGHT: 154 LBS | HEART RATE: 65 BPM | SYSTOLIC BLOOD PRESSURE: 155 MMHG

## 2022-01-24 DIAGNOSIS — H61.21 IMPACTED CERUMEN OF RIGHT EAR: ICD-10-CM

## 2022-01-24 DIAGNOSIS — Z96.21 COCHLEAR IMPLANT IN PLACE: Primary | ICD-10-CM

## 2022-01-24 PROCEDURE — 99202 OFFICE O/P NEW SF 15 MIN: CPT | Mod: 25 | Performed by: OTOLARYNGOLOGY

## 2022-01-24 PROCEDURE — 69210 REMOVE IMPACTED EAR WAX UNI: CPT | Performed by: OTOLARYNGOLOGY

## 2022-01-24 NOTE — LETTER
1/24/2022         RE: Negin Magallon  [address] [id number]        Dear Colleague,    Thank you for referring your patient, Negin Magallon, to the [facility]. Please see a copy of my visit note below.    History of Present Illness - Negin Magallon is a very pleasant 81 year old female here to see me for the first time for ear check.  But she has a long history of ear disease and was a long time patient of the [facility] group.      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  She had a cochlear implant placed in August 2010 and replaced in 2011, and the processor was upgraded in November of 2021.      At that point her LEFT canal appeared clear, and the RIGHT canal was severely impacted with cerumen. She was sent to me for cerumen removal.      Past Medical History -   Patient Active Problem List   Diagnosis     Meniere's disease     Cochlear implant in place     Hyperlipidemia LDL goal <130     Hypertension goal BP (blood pressure) < 140/90     DJD (degenerative joint disease)     Gout     Shoulder impingement     Adhesive capsulitis     Hypothyroidism due to acquired atrophy of thyroid     Chronic kidney disease, stage 3 (H)     Elevated glucose       Current Medications -   Current Outpatient Medications:      acetaminophen (TYLENOL) 325 MG tablet, Take 1-2 tablets by mouth every 6 hours as needed. As needed, Disp: , Rfl:      CALCIUM + D OR, 1 DAILY, Disp: , Rfl:      colchicine (COLCYRS) 0.6 MG tablet, Take 1 tablet (0.6 mg) by mouth daily For 2 weeks, then daily as needed for gout (Patient not taking: Reported on 11/29/2021), Disp: 30 tablet, Rfl: 1     furosemide (LASIX) 20 MG tablet, TAKE 1 TABLET(20 MG) BY MOUTH DAILY, Disp: 90 tablet, Rfl: 1     indomethacin (INDOCIN) 25 MG capsule, Take 1 capsule (25 mg) by mouth 2 times daily (with meals) (Patient not taking: Reported  on 2021), Disp: 30 capsule, Rfl: 0     levothyroxine (SYNTHROID/LEVOTHROID) 50 MCG tablet, Take 1 tablet (50 mcg) by mouth daily, Disp: 90 tablet, Rfl: 3     meclizine (ANTIVERT) 25 MG tablet, Take 1 tablet (25 mg) by mouth 2 times daily, Disp: 60 tablet, Rfl: 0     metoprolol tartrate (LOPRESSOR) 25 MG tablet, Take 1 tablet (25 mg) by mouth 2 times daily, Disp: 180 tablet, Rfl: 3     spironolactone (ALDACTONE) 25 MG tablet, TAKE 1 TABLET (25 MG) BY MOUTH DAILY, Disp: 90 tablet, Rfl: 1     STATIN NOT PRESCRIBED, INTENTIONAL,, by Other route continuous prn., Disp: , Rfl: 0    Allergies -   Allergies   Allergen Reactions     Alendronic Acid      Bone pain     Amlodipine Swelling     See late 2019 notes.      Amoxicillin Swelling     tongue     Lipitor [Atorvastatin Calcium]      Muscle aches.  Has also tried muvacor, provachol, lopid, and crestor with same results     Lisinopril Cough     Prevacid [Aspartame] Diarrhea     Advil [Ibuprofen] Rash     Face swelling     Losartan Rash     Prednisone Swelling and Rash     Medrol dose pack       Social History -   Social History     Socioeconomic History     Marital status:      Spouse name: Not on file     Number of children: Not on file     Years of education: Not on file     Highest education level: Not on file   Occupational History     Not on file   Tobacco Use     Smoking status: Former Smoker     Packs/day: 1.00     Years: 50.00     Pack years: 50.00     Types: Cigarettes     Start date: 3/14/1954     Quit date: 3/14/2004     Years since quittin.8     Smokeless tobacco: Never Used   Substance and Sexual Activity     Alcohol use: No     Comment: rarely,socially/1/2 months     Drug use: No     Sexual activity: Not Currently     Partners: Male   Other Topics Concern     Parent/sibling w/ CABG, MI or angioplasty before 65F 55M? No   Social History Narrative    Lives in home in one story house with  (he has CAD).   No children     Social  Determinants of Health     Financial Resource Strain: Not on file   Food Insecurity: Not on file   Transportation Needs: Not on file   Physical Activity: Not on file   Stress: Not on file   Social Connections: Not on file   Intimate Partner Violence: Not on file   Housing Stability: Not on file       Family History -   Family History   Problem Relation Age of Onset     Diabetes Father        Review of Systems - As per HPI and PMHx, otherwise 10+ system review of the head and neck, and general constitution is negative.    Physical Exam  BP (!) 155/79 (BP Location: Right arm, Patient Position: Sitting, Cuff Size: Adult Regular)   Pulse 65   Wt 69.9 kg (154 lb)   SpO2 100%   BMI 26.43 kg/m      General - The patient is well nourished and well developed, and appears to have good nutritional status.  Alert and oriented to person and place, answers questions and cooperates with examination appropriately.   Head and Face - Normocephalic and atraumatic, with no gross asymmetry noted of the contour of the facial features.  The facial nerve is intact, with strong symmetric movements.  Voice and Breathing - The patient was breathing comfortably without the use of accessory muscles. There was no wheezing, stridor, or stertor.  The patients voice was clear and strong, and had appropriate pitch and quality.  Eyes - Extraocular movements intact, and the pupils were reactive to light.  Sclera were not icteric or injected, conjunctiva were pink and moist.      Cerumen Removal    Physical Exam and Procedure  Ears - On examination of the ears, I found that the RIGHT  side had cerumen impaction.  Therefore, I positioned them in the examination chair in a semi-supine position, beginning with the right side.  I used the binocular surgical microscope to perform cerumen removal.  I began by using a cerumen loop to gently lift the edges of the cerumen mass away from the walls of the external canal.  Once I did this, I was able to  suction away fragments of wax and debris using suction.  Once the mass was loose enough, the entire plug was pulled from the canal.  The tympanic membrane was intact, no sign of perforation or middle ear effusion.    The LEFT canal was clear and healthy, and the LEFT tympanic membrane normal.      The RIGHT CI site is clean, dry, and healthy appearing.      A/P - Negin Magallon is a 81 year old female  (Z96.21) Cochlear implant in place  (primary encounter diagnosis)  (H61.21) Impacted cerumen of right ear    The patient has had their cerumen procedurally removed today.  I have discussed ear care at home, including avoiding qtips or any other object placed in the canal.  I have also discussed that over the counter cerumen kits like Debrox or Cerumenex can be useful.    If no other issues, follow up with me in one year for an ear check.          Again, thank you for allowing me to participate in the care of your patient.        Sincerely,        Kosta Peters MD

## 2022-01-24 NOTE — NURSING NOTE
"Chief Complaint   Patient presents with     Cerumen Impaction       Vitals:    01/24/22 1227   BP: (!) 155/79   BP Location: Right arm   Patient Position: Sitting   Cuff Size: Adult Regular   Pulse: 65   SpO2: 100%   Weight: 69.9 kg (154 lb)     Wt Readings from Last 1 Encounters:   01/24/22 69.9 kg (154 lb)     Ht Readings from Last 1 Encounters:   04/29/21 1.626 m (5' 4\")       Loida Chiu [facility], 1/24/2022 12:28 PM    "

## 2022-02-02 DIAGNOSIS — I10 HYPERTENSION GOAL BP (BLOOD PRESSURE) < 140/90: ICD-10-CM

## 2022-02-03 RX ORDER — SPIRONOLACTONE 25 MG/1
TABLET ORAL
Qty: 90 TABLET | Refills: 1 | Status: SHIPPED | OUTPATIENT
Start: 2022-02-03 | End: 2022-05-23

## 2022-02-03 NOTE — TELEPHONE ENCOUNTER
Routing refill request to provider for review/approval because:  Labs not in range:  creatinine  BP    Creatinine   Date Value Ref Range Status   09/08/2021 1.21 (H) 0.52 - 1.04 mg/dL Final   05/20/2021 1.35 (H) 0.52 - 1.04 mg/dL Final      BP Readings from Last 3 Encounters:   01/24/22 (!) 155/79   11/29/21 (!) 166/81   09/13/21 (!) 142/68              Pending Prescriptions:                       Disp   Refills    spironolactone (ALDACTONE) 25 MG tablet [P*90 tab*1        Sig: TAKE 1 TABLET(25 MG) BY MOUTH DAILY        Houston Vasquez RN

## 2022-03-29 DIAGNOSIS — I10 HYPERTENSION GOAL BP (BLOOD PRESSURE) < 140/90: ICD-10-CM

## 2022-03-29 RX ORDER — FUROSEMIDE 20 MG
TABLET ORAL
Qty: 90 TABLET | Refills: 1 | Status: SHIPPED | OUTPATIENT
Start: 2022-03-29 | End: 2022-05-23

## 2022-04-14 ENCOUNTER — IMMUNIZATION (OUTPATIENT)
Dept: NURSING | Facility: CLINIC | Age: 82
End: 2022-04-14
Payer: MEDICARE

## 2022-04-14 PROCEDURE — 91305 COVID-19,PF,PFIZER (12+ YRS): CPT

## 2022-04-14 PROCEDURE — 0054A COVID-19,PF,PFIZER (12+ YRS): CPT

## 2022-05-08 ENCOUNTER — HEALTH MAINTENANCE LETTER (OUTPATIENT)
Age: 82
End: 2022-05-08

## 2022-05-17 ENCOUNTER — LAB (OUTPATIENT)
Dept: LAB | Facility: CLINIC | Age: 82
End: 2022-05-17
Payer: MEDICARE

## 2022-05-17 DIAGNOSIS — E03.9 HYPOTHYROIDISM, UNSPECIFIED TYPE: ICD-10-CM

## 2022-05-17 DIAGNOSIS — Z11.59 NEED FOR HEPATITIS C SCREENING TEST: ICD-10-CM

## 2022-05-17 DIAGNOSIS — E78.5 HYPERLIPIDEMIA LDL GOAL <130: ICD-10-CM

## 2022-05-17 DIAGNOSIS — N18.30 CHRONIC KIDNEY DISEASE, STAGE 3 (H): Primary | ICD-10-CM

## 2022-05-17 DIAGNOSIS — I10 HYPERTENSION GOAL BP (BLOOD PRESSURE) < 140/90: ICD-10-CM

## 2022-05-17 DIAGNOSIS — N18.31 STAGE 3A CHRONIC KIDNEY DISEASE (H): ICD-10-CM

## 2022-05-17 DIAGNOSIS — R73.9 BLOOD GLUCOSE ELEVATED: ICD-10-CM

## 2022-05-17 LAB
ANION GAP SERPL CALCULATED.3IONS-SCNC: 7 MMOL/L (ref 3–14)
BUN SERPL-MCNC: 20 MG/DL (ref 7–30)
CALCIUM SERPL-MCNC: 9.8 MG/DL (ref 8.5–10.1)
CHLORIDE BLD-SCNC: 99 MMOL/L (ref 94–109)
CHOLEST SERPL-MCNC: 275 MG/DL
CO2 SERPL-SCNC: 29 MMOL/L (ref 20–32)
CREAT SERPL-MCNC: 1.31 MG/DL (ref 0.52–1.04)
CREAT UR-MCNC: 20 MG/DL
ERYTHROCYTE [DISTWIDTH] IN BLOOD BY AUTOMATED COUNT: 12.9 % (ref 10–15)
FASTING STATUS PATIENT QL REPORTED: YES
GFR SERPL CREATININE-BSD FRML MDRD: 41 ML/MIN/1.73M2
GLUCOSE BLD-MCNC: 125 MG/DL (ref 70–99)
HBA1C MFR BLD: 6 % (ref 0–5.6)
HCT VFR BLD AUTO: 40.2 % (ref 35–47)
HCV AB SERPL QL IA: NONREACTIVE
HDLC SERPL-MCNC: 42 MG/DL
HGB BLD-MCNC: 13.2 G/DL (ref 11.7–15.7)
LDLC SERPL CALC-MCNC: 162 MG/DL
MCH RBC QN AUTO: 32.6 PG (ref 26.5–33)
MCHC RBC AUTO-ENTMCNC: 32.8 G/DL (ref 31.5–36.5)
MCV RBC AUTO: 99 FL (ref 78–100)
MICROALBUMIN UR-MCNC: 15 MG/L
MICROALBUMIN/CREAT UR: 75 MG/G CR (ref 0–25)
NONHDLC SERPL-MCNC: 233 MG/DL
PLATELET # BLD AUTO: 216 10E3/UL (ref 150–450)
POTASSIUM BLD-SCNC: 4.9 MMOL/L (ref 3.4–5.3)
RBC # BLD AUTO: 4.05 10E6/UL (ref 3.8–5.2)
SODIUM SERPL-SCNC: 135 MMOL/L (ref 133–144)
TRIGL SERPL-MCNC: 357 MG/DL
TSH SERPL DL<=0.005 MIU/L-ACNC: 2.32 MU/L (ref 0.4–4)
WBC # BLD AUTO: 8.2 10E3/UL (ref 4–11)

## 2022-05-17 PROCEDURE — 84443 ASSAY THYROID STIM HORMONE: CPT

## 2022-05-17 PROCEDURE — 85027 COMPLETE CBC AUTOMATED: CPT

## 2022-05-17 PROCEDURE — 80061 LIPID PANEL: CPT

## 2022-05-17 PROCEDURE — 86803 HEPATITIS C AB TEST: CPT

## 2022-05-17 PROCEDURE — 36415 COLL VENOUS BLD VENIPUNCTURE: CPT

## 2022-05-17 PROCEDURE — 83036 HEMOGLOBIN GLYCOSYLATED A1C: CPT

## 2022-05-17 PROCEDURE — 80048 BASIC METABOLIC PNL TOTAL CA: CPT

## 2022-05-17 PROCEDURE — 82043 UR ALBUMIN QUANTITATIVE: CPT

## 2022-05-18 NOTE — RESULT ENCOUNTER NOTE
"Negin Magallon    Electrolytes and kidney function are stable.  The blood count looks great.  Cholesterol remains elevated.  The HgbA1C and glucose levels imply \"prediabetes\" - you would want to keep your weight down and increase exercise daily to delay that diagnosis    See you soon!    Sincerely,       VIOLETTA BROWN M.D.  "

## 2022-05-23 ENCOUNTER — OFFICE VISIT (OUTPATIENT)
Dept: INTERNAL MEDICINE | Facility: CLINIC | Age: 82
End: 2022-05-23
Payer: MEDICARE

## 2022-05-23 VITALS
BODY MASS INDEX: 25.95 KG/M2 | TEMPERATURE: 97.5 F | OXYGEN SATURATION: 96 % | DIASTOLIC BLOOD PRESSURE: 85 MMHG | HEIGHT: 64 IN | HEART RATE: 66 BPM | WEIGHT: 152 LBS | SYSTOLIC BLOOD PRESSURE: 135 MMHG

## 2022-05-23 DIAGNOSIS — N18.31 STAGE 3A CHRONIC KIDNEY DISEASE (H): ICD-10-CM

## 2022-05-23 DIAGNOSIS — Z00.01 ENCOUNTER FOR GENERAL ADULT MEDICAL EXAMINATION WITH ABNORMAL FINDINGS: Primary | ICD-10-CM

## 2022-05-23 DIAGNOSIS — Z96.21 COCHLEAR IMPLANT IN PLACE: ICD-10-CM

## 2022-05-23 DIAGNOSIS — N18.32 HYPERTENSIVE KIDNEY DISEASE WITH STAGE 3B CHRONIC KIDNEY DISEASE (H): ICD-10-CM

## 2022-05-23 DIAGNOSIS — R73.03 PREDIABETES: ICD-10-CM

## 2022-05-23 DIAGNOSIS — E03.9 HYPOTHYROIDISM, UNSPECIFIED TYPE: ICD-10-CM

## 2022-05-23 DIAGNOSIS — I10 ESSENTIAL HYPERTENSION WITH GOAL BLOOD PRESSURE LESS THAN 140/90: ICD-10-CM

## 2022-05-23 DIAGNOSIS — H81.09 MENIERE'S DISEASE, UNSPECIFIED LATERALITY: ICD-10-CM

## 2022-05-23 DIAGNOSIS — I12.9 HYPERTENSIVE KIDNEY DISEASE WITH STAGE 3B CHRONIC KIDNEY DISEASE (H): ICD-10-CM

## 2022-05-23 PROCEDURE — 99214 OFFICE O/P EST MOD 30 MIN: CPT | Mod: 25 | Performed by: INTERNAL MEDICINE

## 2022-05-23 PROCEDURE — G0439 PPPS, SUBSEQ VISIT: HCPCS | Performed by: INTERNAL MEDICINE

## 2022-05-23 RX ORDER — MECLIZINE HYDROCHLORIDE 25 MG/1
25 TABLET ORAL
Qty: 60 TABLET | Refills: 0 | Status: SHIPPED | OUTPATIENT
Start: 2022-05-23

## 2022-05-23 RX ORDER — SPIRONOLACTONE 25 MG/1
TABLET ORAL
Qty: 90 TABLET | Refills: 1 | Status: SHIPPED | OUTPATIENT
Start: 2022-05-23 | End: 2022-05-23

## 2022-05-23 RX ORDER — SPIRONOLACTONE 25 MG/1
TABLET ORAL
Qty: 90 TABLET | Refills: 3 | Status: SHIPPED | OUTPATIENT
Start: 2022-05-23 | End: 2023-06-28

## 2022-05-23 RX ORDER — LEVOTHYROXINE SODIUM 50 UG/1
50 TABLET ORAL DAILY
Qty: 90 TABLET | Refills: 3 | Status: SHIPPED | OUTPATIENT
Start: 2022-05-23 | End: 2023-06-01

## 2022-05-23 RX ORDER — FUROSEMIDE 20 MG
20 TABLET ORAL DAILY
Qty: 90 TABLET | Refills: 1 | Status: SHIPPED | OUTPATIENT
Start: 2022-05-23 | End: 2022-05-23

## 2022-05-23 RX ORDER — FUROSEMIDE 20 MG
20 TABLET ORAL DAILY
Qty: 90 TABLET | Refills: 3 | Status: SHIPPED | OUTPATIENT
Start: 2022-05-23 | End: 2023-06-07

## 2022-05-23 RX ORDER — METOPROLOL TARTRATE 25 MG/1
25 TABLET, FILM COATED ORAL 2 TIMES DAILY
Qty: 180 TABLET | Refills: 3 | Status: SHIPPED | OUTPATIENT
Start: 2022-05-23 | End: 2023-06-01

## 2022-05-23 ASSESSMENT — ACTIVITIES OF DAILY LIVING (ADL)
CURRENT_FUNCTION: SHOPPING REQUIRES ASSISTANCE
CURRENT_FUNCTION: TELEPHONE REQUIRES ASSISTANCE
CURRENT_FUNCTION: TRANSPORTATION REQUIRES ASSISTANCE

## 2022-05-23 NOTE — PROGRESS NOTES
"SUBJECTIVE:   Negin Magallon is a 82 year old female who presents for Preventive Visit.    83 y/o F here for AFE.      H/o cochlear implant (Meniere's dx), hypothyroid, djd, HTN (Metoprolol, furosemide, low sodium diet), elevated Uric Acid (declines allopurinol), Elevated cholesterol (intolerant of statins).     Recent labs generally stable, cholesterol elevated but unablt to tolerate statins     Swelling is better off amlodipine?      Are you in the first 12 months of your Medicare coverage?  No    Healthy Habits:    In general, how would you rate your overall health?  Very good    Frequency of exercise:  1 day/week    Duration of exercise:  Less than 15 minutes    Do you usually eat at least 4 servings of fruit and vegetables a day, include whole grains    & fiber and avoid regularly eating high fat or \"junk\" foods?  Yes    Taking medications regularly:  Yes    Barriers to taking medications:  Side effects    Medication side effects:  Muscle aches    Ability to successfully perform activities of daily living:  Telephone requires assistance, transportation requires assistance and shopping requires assistance    Home Safety:  No safety concerns identified    Hearing Impairment:  Difficulty following a conversation in a noisy restaurant or crowded room, feel that people are mumbling or not speaking clearly, difficulty following dialogue in the theater, difficult to understand a speaker at a public meeting or [employer] service, need to ask people to speak up or repeat themselves, difficulty understanding soft or whispered speech and difficulty understanding speech on the telephone    In the past 6 months, have you been bothered by leaking of urine?  No    In general, how would you rate your overall mental or emotional health?  Very good      PHQ-2 Total Score:    Additional concerns today:  No    Do you feel safe in your environment? Yes    Have you ever done Advance Care Planning? (For example, a Health " Directive, POLST, or a discussion with a medical provider or your loved ones about your wishes): Yes, advance care planning is on file.       Fall risk       Cognitive Screening   1) Repeat 3 items (Leader, Season, Table)    2) Clock draw: NORMAL  3) 3 item recall: Recalls 3 objects  Results: 3 items recalled: COGNITIVE IMPAIRMENT LESS LIKELY    Mini-CogTM Copyright NAKUL Rocha. Licensed by the author for use in [facility]; reprinted with permission (soob@[facility]). All rights reserved.      Do you have sleep apnea, excessive snoring or daytime drowsiness?: no    Reviewed and updated as needed this visit by clinical staff                    Reviewed and updated as needed this visit by Provider                   Social History     Tobacco Use     Smoking status: Former Smoker     Packs/day: 1.00     Years: 50.00     Pack years: 50.00     Types: Cigarettes     Start date: 3/14/1954     Quit date: 3/14/2004     Years since quittin.2     Smokeless tobacco: Never Used   Substance Use Topics     Alcohol use: No     Comment: rarely,socially/1/2 months     If you drink alcohol do you typically have >3 drinks per day or >7 drinks per week? No    Alcohol Use 2019   Prescreen: >3 drinks/day or >7 drinks/week? No   Prescreen: >3 drinks/day or >7 drinks/week? -           No Concerns     Current providers sharing in care for this patient include:   Patient Care Team:  Hazel Young MD as PCP - General (Internal Medicine)  Hazel Young MD as Assigned PCP  Leah Lieberman AuD as Audiologist (Audiology)  Kosta Peters MD as Assigned Surgical Provider    The following health maintenance items are reviewed in Epic and correct as of today:  Health Maintenance Due   Topic Date Due     URINALYSIS  Never done     DTAP/TDAP/TD IMMUNIZATION (1 - Tdap) Never done     ZOSTER IMMUNIZATION (1 of 2) Never done     PHQ-2 (once per calendar year)  2022     ANNUAL REVIEW OF HM ORDERS  2022      MEDICARE ANNUAL WELLNESS VISIT  2022     Labs reviewed in EPIC  BP Readings from Last 3 Encounters:   22 135/85   22 (!) 155/79   21 (!) 166/81    Wt Readings from Last 3 Encounters:   22 68.9 kg (152 lb)   22 69.9 kg (154 lb)   21 69.4 kg (153 lb)                  Patient Active Problem List   Diagnosis     Meniere's disease     Cochlear implant in place     Hyperlipidemia LDL goal <130     Hypertension goal BP (blood pressure) < 140/90     DJD (degenerative joint disease)     Gout     Shoulder impingement     Adhesive capsulitis     Hypothyroidism due to acquired atrophy of thyroid     Chronic kidney disease, stage 3 (H)     Elevated glucose     Past Surgical History:   Procedure Laterality Date     ABDOMEN SURGERY       COLONOSCOPY       ENT SURGERY  2010     R cochlear implant     EYE SURGERY      L eye muscle     GI SURGERY       HEMORRHOID SURGERY       HYSTERECTOMY, CERVIX STATUS UNKNOWN       IMPLANT REVISION COCHLEA  2011    Procedure:IMPLANT REVISION COCHLEA;  Remove and replace nucleus cochlear implant right.; Surgeon:BRITTANEY STAUFFER; Location: OR       Social History     Tobacco Use     Smoking status: Former Smoker     Packs/day: 1.00     Years: 50.00     Pack years: 50.00     Types: Cigarettes     Start date: 3/14/1954     Quit date: 3/14/2004     Years since quittin.2     Smokeless tobacco: Never Used   Substance Use Topics     Alcohol use: No     Comment: rarely,socially/1/2 months     Family History   Problem Relation Age of Onset     Diabetes Father          Current Outpatient Medications   Medication Sig Dispense Refill     acetaminophen (TYLENOL) 325 MG tablet Take 1-2 tablets by mouth every 6 hours as needed As needed       CALCIUM + D OR 1 DAILY       furosemide (LASIX) 20 MG tablet Take 1 tablet (20 mg) by mouth daily 90 tablet 3     levothyroxine (SYNTHROID/LEVOTHROID) 50 MCG tablet Take 1 tablet (50 mcg) by mouth daily 90 tablet 3      meclizine (ANTIVERT) 25 MG tablet Take 1 tablet (25 mg) by mouth 2 times daily 60 tablet 0     metoprolol tartrate (LOPRESSOR) 25 MG tablet Take 1 tablet (25 mg) by mouth 2 times daily 180 tablet 3     spironolactone (ALDACTONE) 25 MG tablet TAKE 1 TABLET(25 MG) BY MOUTH DAILY 90 tablet 3     STATIN NOT PRESCRIBED, INTENTIONAL, by Other route continuous prn.  0     indomethacin (INDOCIN) 25 MG capsule Take 1 capsule (25 mg) by mouth 2 times daily (with meals) (Patient not taking: Reported on 5/23/2022) 30 capsule 0     Allergies   Allergen Reactions     Alendronic Acid      Bone pain     Amlodipine Swelling     See late 2019 notes.      Amoxicillin Swelling     tongue     Colchicine      Loose stools     Lipitor [Atorvastatin Calcium]      Muscle aches.  Has also tried muvacor, provachol, lopid, and crestor with same results     Lisinopril Cough     Prevacid [Aspartame] Diarrhea     Advil [Ibuprofen] Rash     Face swelling     Losartan Rash     Prednisone Swelling and Rash     Medrol dose pack     Recent Labs   Lab Test 05/17/22  0806 09/08/21  1124 07/29/21  1513 05/20/21  0845 04/26/21  0834 06/11/20  0814 08/16/17  1220 03/08/17  1303 04/15/16  0916 03/18/16  1223 03/16/15  1613   A1C 6.0*  --   --   --  5.8* 6.0*   < >  --   --   --   --    *  --   --   --  201*  --   --   --   --   --   --    HDL 42*  --   --   --  45*  --   --   --   --   --   --    TRIG 357*  --   --   --  292*  --   --   --   --   --   --    ALT  --   --   --   --   --   --   --  25  --  15 21   CR 1.31* 1.21*   < > 1.35* 1.35* 1.24*   < > 0.96   < > 1.08* 0.78   GFRESTIMATED 41* 43*   < > 37* 37* 41*   < > 57*   < > 50* 72   GFRESTBLACK  --   --   --  43* 43* 48*   < > 68   < > 60* 88   POTASSIUM 4.9 4.6   < > 4.1 4.5 3.9   < > 4.9   < > 4.2 4.1   TSH 2.32  --   --   --  1.36 2.50   < > 2.32   < > 1.61 1.02    < > = values in this interval not displayed.           Mammogram Screening - Patient over age 75, has elected to discontinue  "screenings.  Pertinent mammograms are reviewed under the imaging tab.    Review of Systems  Constitutional, HEENT, cardiovascular, pulmonary, gi and gu systems are negative, except as otherwise noted.    OBJECTIVE:   /85 (BP Location: Right arm, Patient Position: Sitting, Cuff Size: Adult Regular)   Pulse 66   Temp 97.5  F (36.4  C) (Oral)   Ht 1.613 m (5' 3.5\")   Wt 68.9 kg (152 lb)   SpO2 96%   BMI 26.50 kg/m   Estimated body mass index is 26.43 kg/m  as calculated from the following:    Height as of 4/29/21: 1.626 m (5' 4\").    Weight as of 1/24/22: 69.9 kg (154 lb).     Physical Exam  GENERAL APPEARANCE: healthy, alert and no distress  EYES: Eyes grossly normal to inspection, PERRL and conjunctivae and sclerae normal  HENT: ear canals and TM's normal, nose and mouth without ulcers or lesions, oropharynx clear and oral mucous membranes moist  HENT: she has cochlear implants.    NECK: no adenopathy, no asymmetry, masses, or scars and thyroid normal to palpation  RESP: lungs clear to auscultation - no rales, rhonchi or wheezes  BREAST: defer  CV: regular rate and rhythm, normal S1 S2, no S3 or S4, no murmur, click or rub, no peripheral edema and peripheral pulses strong  ABDOMEN: soft, nontender, no hepatosplenomegaly, no masses and bowel sounds normal  MS: no musculoskeletal defects are noted and gait is age appropriate without ataxia  SKIN: no suspicious lesions or rashes  NEURO: Normal strength and tone, sensory exam grossly normal, mentation intact and speech normal  PSYCH: mentation appears normal and affect normal/bright    Diagnostic Test Results:  Labs reviewed in Epic  See orders.     ASSESSMENT / PLAN:   (Z00.01) Encounter for general adult medical examination with abnormal findings  (primary encounter diagnosis)  Comment: she is doing well.  Relies on her  to help with her hearing deficit in day to day life (grocery shopping, telephone calls)   Plan:      (I10) Essential hypertension " "with goal blood pressure less than 140/90  Comment: borderline normal, continue same medication.   Plan: metoprolol tartrate (LOPRESSOR) 25 MG tablet,         furosemide (LASIX) 20 MG tablet, spironolactone        (ALDACTONE) 25 MG tablet, OFFICE/OUTPT         VISIT,EST,LEVL III        Controlled.  Tolerating regimen well     Labs are stable    (I12.9,  N18.32) Hypertensive kidney disease with stage 3b chronic kidney disease (H)  Comment:  Stable. .  Tolerating regimen well     Labs are stable  Plan: OFFICE/OUTPT VISIT,EST,LEVL III             (E03.9) Hypothyroidism, unspecified type  Comment:  Euthyroid.   Plan: levothyroxine (SYNTHROID/LEVOTHROID) 50 MCG         tablet, OFFICE/OUTPT VISIT,EST,LEVL III        Continue current management      (Z96.21) Cochlear implant in place  Comment:  Improved since replaced past year   Still very hearing impaired.   Plan: 86 y/o  helps her with certain deeds like telephone calls, etc     (N18.31) Stage 3a chronic kidney disease (H)  Comment:  Stable   Plan: OFFICE/OUTPT VISIT,EST,LEVL III             (H81.09) Meniere's disease, unspecified laterality  Comment:  Cochlear implant.   Plan: meclizine (ANTIVERT) 25 MG tablet             (R73.03) Prediabetes  Comment: A1C is 6.0  She has lost about 15# since I met her a few years ago.   Plan:        Declines Tdap Shingrix.              COUNSELING:  Reviewed preventive health counseling, as reflected in patient instructions       Regular exercise       Healthy diet/nutrition    Estimated body mass index is 26.43 kg/m  as calculated from the following:    Height as of 4/29/21: 1.626 m (5' 4\").    Weight as of 1/24/22: 69.9 kg (154 lb).        She reports that she quit smoking about 18 years ago. Her smoking use included cigarettes. She started smoking about 68 years ago. She has a 50.00 pack-year smoking history. She has never used smokeless tobacco.      Appropriate preventive services were discussed with this patient, including " applicable screening as appropriate for cardiovascular disease, diabetes, osteopenia/osteoporosis, and glaucoma.  As appropriate for age/gender, discussed screening for colorectal cancer, prostate cancer, breast cancer, and cervical cancer. Checklist reviewing preventive services available has been given to the patient.    Reviewed patients plan of care and provided an AVS. The Basic Care Plan (routine screening as documented in Health Maintenance) for Negin meets the Care Plan requirement. This Care Plan has been established and reviewed with the Patient.    Counseling Resources:  ATP IV Guidelines  Pooled Cohorts Equation Calculator  Breast Cancer Risk Calculator  Breast Cancer: Medication to Reduce Risk  FRAX Risk Assessment  ICSI Preventive Guidelines  Dietary Guidelines for Americans, 2010  USDA's MyPlate  ASA Prophylaxis  Lung CA Screening    Hazel Young MD  [facility]    Identified Health Risks:

## 2022-10-05 ENCOUNTER — IMMUNIZATION (OUTPATIENT)
Dept: FAMILY MEDICINE | Facility: CLINIC | Age: 82
End: 2022-10-05
Payer: MEDICARE

## 2022-10-05 DIAGNOSIS — Z23 NEED FOR PROPHYLACTIC VACCINATION AND INOCULATION AGAINST INFLUENZA: Primary | ICD-10-CM

## 2022-10-05 PROCEDURE — 90662 IIV NO PRSV INCREASED AG IM: CPT

## 2022-10-05 PROCEDURE — G0008 ADMIN INFLUENZA VIRUS VAC: HCPCS

## 2022-10-05 PROCEDURE — 99207 PR NO CHARGE NURSE ONLY: CPT

## 2022-11-01 ENCOUNTER — IMMUNIZATION (OUTPATIENT)
Dept: FAMILY MEDICINE | Facility: CLINIC | Age: 82
End: 2022-11-01
Payer: MEDICARE

## 2022-11-01 DIAGNOSIS — Z23 HIGH PRIORITY FOR 2019-NCOV VACCINE: Primary | ICD-10-CM

## 2022-11-01 PROCEDURE — 91312 COVID-19,PF,PFIZER BOOSTER BIVALENT: CPT

## 2022-11-01 PROCEDURE — 0124A COVID-19,PF,PFIZER BOOSTER BIVALENT: CPT

## 2023-04-23 ENCOUNTER — PATIENT OUTREACH (OUTPATIENT)
Dept: CARE COORDINATION | Facility: CLINIC | Age: 83
End: 2023-04-23
Payer: MEDICARE

## 2023-05-07 ENCOUNTER — PATIENT OUTREACH (OUTPATIENT)
Dept: CARE COORDINATION | Facility: CLINIC | Age: 83
End: 2023-05-07
Payer: MEDICARE

## 2023-05-15 ENCOUNTER — TELEPHONE (OUTPATIENT)
Dept: FAMILY MEDICINE | Facility: CLINIC | Age: 83
End: 2023-05-15
Payer: MEDICARE

## 2023-05-15 DIAGNOSIS — Z00.00 ENCOUNTER FOR MEDICARE ANNUAL WELLNESS EXAM: ICD-10-CM

## 2023-05-15 DIAGNOSIS — Z76.89 ENCOUNTER TO ESTABLISH CARE WITH NEW DOCTOR: Primary | ICD-10-CM

## 2023-05-15 NOTE — TELEPHONE ENCOUNTER
Patient requesting labs to be drawn prior to AWV/establishing care visit with Dr. Washington on 23. Would like to have labs drawn before appointment on 23.    Writer reviewed chart and noted HM orders will  before this point on 23. Please place lab orders needed.    Thanks,  KALIA SmithN RN  [facility]

## 2023-06-01 ENCOUNTER — OFFICE VISIT (OUTPATIENT)
Dept: FAMILY MEDICINE | Facility: CLINIC | Age: 83
End: 2023-06-01
Payer: MEDICARE

## 2023-06-01 VITALS
TEMPERATURE: 98.9 F | OXYGEN SATURATION: 98 % | DIASTOLIC BLOOD PRESSURE: 74 MMHG | RESPIRATION RATE: 20 BRPM | HEART RATE: 59 BPM | BODY MASS INDEX: 27.03 KG/M2 | WEIGHT: 155 LBS | SYSTOLIC BLOOD PRESSURE: 141 MMHG

## 2023-06-01 DIAGNOSIS — E03.9 HYPOTHYROIDISM, UNSPECIFIED TYPE: ICD-10-CM

## 2023-06-01 DIAGNOSIS — N18.32 STAGE 3B CHRONIC KIDNEY DISEASE (H): ICD-10-CM

## 2023-06-01 DIAGNOSIS — J01.00 ACUTE MAXILLARY SINUSITIS, RECURRENCE NOT SPECIFIED: Primary | ICD-10-CM

## 2023-06-01 DIAGNOSIS — I10 ESSENTIAL HYPERTENSION WITH GOAL BLOOD PRESSURE LESS THAN 140/90: ICD-10-CM

## 2023-06-01 PROCEDURE — 99214 OFFICE O/P EST MOD 30 MIN: CPT | Performed by: FAMILY MEDICINE

## 2023-06-01 RX ORDER — GUAIFENESIN 200 MG/10ML
200 LIQUID ORAL EVERY 4 HOURS PRN
Qty: 118 ML | Refills: 0 | Status: SHIPPED | OUTPATIENT
Start: 2023-06-01

## 2023-06-01 RX ORDER — LEVOTHYROXINE SODIUM 50 UG/1
50 TABLET ORAL DAILY
Qty: 90 TABLET | Refills: 3 | Status: CANCELLED | OUTPATIENT
Start: 2023-06-01

## 2023-06-01 RX ORDER — BENZONATATE 100 MG/1
100 CAPSULE ORAL 3 TIMES DAILY PRN
Qty: 30 CAPSULE | Refills: 0 | Status: SHIPPED | OUTPATIENT
Start: 2023-06-01

## 2023-06-01 RX ORDER — METOPROLOL TARTRATE 25 MG/1
25 TABLET, FILM COATED ORAL 2 TIMES DAILY
Qty: 180 TABLET | Refills: 3 | Status: CANCELLED | OUTPATIENT
Start: 2023-06-01

## 2023-06-01 RX ORDER — DOXYCYCLINE 100 MG/1
100 TABLET ORAL 2 TIMES DAILY
Qty: 14 TABLET | Refills: 0 | Status: SHIPPED | OUTPATIENT
Start: 2023-06-01

## 2023-06-01 RX ORDER — METOPROLOL TARTRATE 25 MG/1
25 TABLET, FILM COATED ORAL 2 TIMES DAILY
Qty: 180 TABLET | Refills: 3 | Status: SHIPPED | OUTPATIENT
Start: 2023-06-01 | End: 2024-05-13

## 2023-06-01 RX ORDER — LEVOTHYROXINE SODIUM 50 UG/1
50 TABLET ORAL DAILY
Qty: 90 TABLET | Refills: 3 | Status: SHIPPED | OUTPATIENT
Start: 2023-06-01 | End: 2024-05-13

## 2023-06-01 NOTE — PROGRESS NOTES
Assessment & Plan   Problem List Items Addressed This Visit        Urinary    Chronic kidney disease, stage 3 (H)   Other Visit Diagnoses     Acute maxillary sinusitis, recurrence not specified    -  Primary    Relevant Medications    doxycycline monohydrate (ADOXA) 100 MG tablet    benzonatate (TESSALON) 100 MG capsule    guaiFENesin (ROBITUSSIN) 20 mg/mL liquid    Hypothyroidism, unspecified type        Relevant Medications    levothyroxine (SYNTHROID/LEVOTHROID) 50 MCG tablet    Essential hypertension with goal blood pressure less than 140/90        borderline normal, continue same medication.     Relevant Medications    metoprolol tartrate (LOPRESSOR) 25 MG tablet       Refilled medications   Empiric therapy for sinusitis - she does not want to do nasal lavage  Labs next week before Dr Felix [facility]OB[facility] JOHN Kam is a 83 year old, presenting for the following health issues:  Hypertension and Thyroid Problem        6/1/2023    11:18 AM   Additional Questions   Roomed by Roro STEWARD CMA   Accompanied by      History of Present Illness       Reason for visit:  Renew prescription plus cough  Symptom onset:  1-2 weeks ago  Symptoms include:  Cough  Symptom intensity:  Moderate  Symptom progression:  Worsening  Had these symptoms before:  No  What makes it better:  Cough drops    She eats 2-3 servings of fruits and vegetables daily.She consumes 0 sweetened beverage(s) daily.She exercises with enough effort to increase her heart rate 9 or less minutes per day.  She exercises with enough effort to increase her heart rate 3 or less days per week.   She is taking medications regularly.       Hypertension Follow-up      Do you check your blood pressure regularly outside of the clinic? Yes     Are you following a low salt diet? Yes    Are your blood pressures ever more than 140 on the top number (systolic) OR more   than 90 on the bottom number  (diastolic), for example 140/90? No    Hypothyroidism Follow-up      Since last visit, patient describes the following symptoms: weight gain of  and loose stools    Concern - Cough  Onset: 1 week  Description: Sore Throat, green mucus  Intensity: moderate  Progression of Symptoms:  worsening  Accompanying Signs & Symptoms: Cough  Previous history of similar problem: not for years  Precipitating factors:        Worsened by: nothing  Alleviating factors:        Improved by: nothing  Therapies tried and outcome: Cough Drops    She requests codeine cough syrup and erythromycin for her cough    Review of Systems         Objective    BP (!) 141/74 (BP Location: Left arm, Patient Position: Chair, Cuff Size: Adult Large)   Pulse 59   Temp 98.9  F (37.2  C) (Oral)   Resp 20   Wt 70.3 kg (155 lb)   SpO2 98%   BMI 27.03 kg/m    Body mass index is 27.03 kg/m .  Physical Exam   GENERAL: healthy, alert and no distress  NECK: no adenopathy, no asymmetry, masses, or scars and thyroid normal to palpation  Subjective tenderness at maxillary sinuses bilaterally  RESP: lungs clear to auscultation - no rales, rhonchi or wheezes  CV: regular rate and rhythm, normal S1 S2, no S3 or S4, no murmur, click or rub, no peripheral edema and peripheral pulses strong

## 2023-06-02 ENCOUNTER — HEALTH MAINTENANCE LETTER (OUTPATIENT)
Age: 83
End: 2023-06-02

## 2023-06-07 DIAGNOSIS — I10 ESSENTIAL HYPERTENSION WITH GOAL BLOOD PRESSURE LESS THAN 140/90: ICD-10-CM

## 2023-06-07 RX ORDER — FUROSEMIDE 20 MG
TABLET ORAL
Qty: 90 TABLET | Refills: 0 | Status: SHIPPED | OUTPATIENT
Start: 2023-06-07 | End: 2023-06-28

## 2023-06-21 ENCOUNTER — LAB (OUTPATIENT)
Dept: LAB | Facility: CLINIC | Age: 83
End: 2023-06-21
Payer: MEDICARE

## 2023-06-21 DIAGNOSIS — Z00.00 ENCOUNTER FOR MEDICARE ANNUAL WELLNESS EXAM: ICD-10-CM

## 2023-06-21 LAB
ALBUMIN SERPL BCG-MCNC: 4.7 G/DL (ref 3.5–5.2)
ALP SERPL-CCNC: 69 U/L (ref 35–104)
ALT SERPL W P-5'-P-CCNC: 15 U/L (ref 0–50)
ANION GAP SERPL CALCULATED.3IONS-SCNC: 12 MMOL/L (ref 7–15)
AST SERPL W P-5'-P-CCNC: 18 U/L (ref 0–45)
BASOPHILS # BLD AUTO: 0.1 10E3/UL (ref 0–0.2)
BASOPHILS NFR BLD AUTO: 2 %
BILIRUB SERPL-MCNC: 0.3 MG/DL
BUN SERPL-MCNC: 33.4 MG/DL (ref 8–23)
CALCIUM SERPL-MCNC: 10 MG/DL (ref 8.8–10.2)
CHLORIDE SERPL-SCNC: 99 MMOL/L (ref 98–107)
CHOLEST SERPL-MCNC: 275 MG/DL
CREAT SERPL-MCNC: 1.5 MG/DL (ref 0.51–0.95)
DEPRECATED HCO3 PLAS-SCNC: 26 MMOL/L (ref 22–29)
EOSINOPHIL # BLD AUTO: 1 10E3/UL (ref 0–0.7)
EOSINOPHIL NFR BLD AUTO: 16 %
ERYTHROCYTE [DISTWIDTH] IN BLOOD BY AUTOMATED COUNT: 12.1 % (ref 10–15)
GFR SERPL CREATININE-BSD FRML MDRD: 35 ML/MIN/1.73M2
GLUCOSE SERPL-MCNC: 123 MG/DL (ref 70–99)
HBA1C MFR BLD: 6 % (ref 0–5.6)
HCT VFR BLD AUTO: 38.6 % (ref 35–47)
HDLC SERPL-MCNC: 41 MG/DL
HGB BLD-MCNC: 12.8 G/DL (ref 11.7–15.7)
IMM GRANULOCYTES # BLD: 0 10E3/UL
IMM GRANULOCYTES NFR BLD: 0 %
LDLC SERPL CALC-MCNC: 186 MG/DL
LYMPHOCYTES # BLD AUTO: 1 10E3/UL (ref 0.8–5.3)
LYMPHOCYTES NFR BLD AUTO: 16 %
MCH RBC QN AUTO: 32.6 PG (ref 26.5–33)
MCHC RBC AUTO-ENTMCNC: 33.2 G/DL (ref 31.5–36.5)
MCV RBC AUTO: 98 FL (ref 78–100)
MONOCYTES # BLD AUTO: 0.5 10E3/UL (ref 0–1.3)
MONOCYTES NFR BLD AUTO: 8 %
NEUTROPHILS # BLD AUTO: 3.6 10E3/UL (ref 1.6–8.3)
NEUTROPHILS NFR BLD AUTO: 58 %
NONHDLC SERPL-MCNC: 234 MG/DL
PLATELET # BLD AUTO: 232 10E3/UL (ref 150–450)
POTASSIUM SERPL-SCNC: 5 MMOL/L (ref 3.4–5.3)
PROT SERPL-MCNC: 7.6 G/DL (ref 6.4–8.3)
RBC # BLD AUTO: 3.93 10E6/UL (ref 3.8–5.2)
SODIUM SERPL-SCNC: 137 MMOL/L (ref 136–145)
TRIGL SERPL-MCNC: 239 MG/DL
TSH SERPL DL<=0.005 MIU/L-ACNC: 1.7 UIU/ML (ref 0.3–4.2)
WBC # BLD AUTO: 6.3 10E3/UL (ref 4–11)

## 2023-06-21 PROCEDURE — 80061 LIPID PANEL: CPT

## 2023-06-21 PROCEDURE — 83036 HEMOGLOBIN GLYCOSYLATED A1C: CPT

## 2023-06-21 PROCEDURE — 80050 GENERAL HEALTH PANEL: CPT

## 2023-06-21 PROCEDURE — 36415 COLL VENOUS BLD VENIPUNCTURE: CPT

## 2023-06-22 PROBLEM — N18.30 CKD (CHRONIC KIDNEY DISEASE) STAGE 3, GFR 30-59 ML/MIN (H): Status: ACTIVE | Noted: 2021-07-29

## 2023-06-28 ENCOUNTER — OFFICE VISIT (OUTPATIENT)
Dept: FAMILY MEDICINE | Facility: CLINIC | Age: 83
End: 2023-06-28
Payer: MEDICARE

## 2023-06-28 VITALS
BODY MASS INDEX: 26.4 KG/M2 | SYSTOLIC BLOOD PRESSURE: 116 MMHG | HEIGHT: 64 IN | HEART RATE: 62 BPM | WEIGHT: 154.6 LBS | TEMPERATURE: 97 F | DIASTOLIC BLOOD PRESSURE: 78 MMHG | OXYGEN SATURATION: 97 % | RESPIRATION RATE: 16 BRPM

## 2023-06-28 DIAGNOSIS — Z00.00 ENCOUNTER FOR MEDICARE ANNUAL WELLNESS EXAM: Primary | ICD-10-CM

## 2023-06-28 DIAGNOSIS — L20.9 ATOPIC DERMATITIS, UNSPECIFIED TYPE: ICD-10-CM

## 2023-06-28 DIAGNOSIS — I10 ESSENTIAL HYPERTENSION WITH GOAL BLOOD PRESSURE LESS THAN 140/90: ICD-10-CM

## 2023-06-28 DIAGNOSIS — M10.9 ACUTE GOUT INVOLVING TOE OF RIGHT FOOT, UNSPECIFIED CAUSE: ICD-10-CM

## 2023-06-28 PROCEDURE — G0439 PPPS, SUBSEQ VISIT: HCPCS | Performed by: STUDENT IN AN ORGANIZED HEALTH CARE EDUCATION/TRAINING PROGRAM

## 2023-06-28 RX ORDER — FUROSEMIDE 20 MG
20 TABLET ORAL DAILY
Qty: 90 TABLET | Refills: 3 | Status: SHIPPED | OUTPATIENT
Start: 2023-06-28 | End: 2024-08-28

## 2023-06-28 RX ORDER — NYSTATIN 100000 [USP'U]/G
POWDER TOPICAL 2 TIMES DAILY
Qty: 60 G | Refills: 3 | Status: SHIPPED | OUTPATIENT
Start: 2023-06-28

## 2023-06-28 RX ORDER — SPIRONOLACTONE 25 MG/1
TABLET ORAL
Qty: 90 TABLET | Refills: 3 | Status: SHIPPED | OUTPATIENT
Start: 2023-06-28 | End: 2024-06-10

## 2023-06-28 RX ORDER — INDOMETHACIN 25 MG/1
25 CAPSULE ORAL 2 TIMES DAILY WITH MEALS
Qty: 30 CAPSULE | Refills: 1 | Status: SHIPPED | OUTPATIENT
Start: 2023-06-28

## 2023-06-28 ASSESSMENT — ENCOUNTER SYMPTOMS
HEMATURIA: 0
ABDOMINAL PAIN: 0
COUGH: 0
CHILLS: 0
CONSTIPATION: 0
HEMATOCHEZIA: 0

## 2023-06-28 ASSESSMENT — ACTIVITIES OF DAILY LIVING (ADL)
CURRENT_FUNCTION: SHOPPING REQUIRES ASSISTANCE
CURRENT_FUNCTION: TELEPHONE REQUIRES ASSISTANCE
CURRENT_FUNCTION: HOUSEWORK REQUIRES ASSISTANCE
CURRENT_FUNCTION: LAUNDRY REQUIRES ASSISTANCE

## 2023-06-28 NOTE — PROGRESS NOTES
She is at risk for lack of exercise and has been provided with information to increase physical activity for the benefit of her well-being.  The patient reports that she has difficulty with activities of daily living. I have asked that the patient make a follow up appointment in 52 weeks where this issue will be further evaluated and addressed.  The patient was provided with written information regarding signs of hearing loss.

## 2023-06-28 NOTE — PATIENT INSTRUCTIONS
Patient Education   Personalized Prevention Plan  You are due for the preventive services outlined below.  Your care team is available to assist you in scheduling these services.  If you have already completed any of these items, please share that information with your care team to update in your medical record.  Health Maintenance Due   Topic Date Due     Urine Test  Never done     Diptheria Tetanus Pertussis (DTAP/TDAP/TD) Vaccine (1 - Tdap) Never done     Zoster (Shingles) Vaccine (1 of 2) Never done     COVID-19 Vaccine (6 - Pfizer series) 03/01/2023     Kidney Microalbumin Urine Test  05/17/2023     Annual Wellness Visit  05/23/2023     ANNUAL REVIEW OF HM ORDERS  05/23/2023       Exercise for a Healthier Heart  You may wonder how you can improve the health of your heart. If you re thinking about exercise, you re on the right track. You don t need to become an athlete. But you do need a certain amount of brisk exercise to help strengthen your heart. If you have been diagnosed with a heart condition, your healthcare provider may advise exercise to help your condition. To help make exercise a habit, choose safe, fun activities.      Exercise with a friend. When activity is fun, you're more likely to stick with it.     Before you start  Check with your healthcare provider before starting an exercise program. This is especially important if you haven't been active for a while. It's also important if you have a long-term (chronic) health problem such as heart disease, diabetes, or obesity. Also check with your provider if you're at high risk for having these problems.   Why exercise?  Exercising regularly offers many healthy rewards. It can help you do all of these:     Improve your blood cholesterol level to help prevent further heart trouble.    Lower your blood pressure to help prevent a stroke or heart attack.    Control diabetes or reduce your risk of getting this disease.    Improve your heart and lung  function.    Reach and stay at a healthy weight.    Make your muscles stronger so you can stay active.    Prevent falls and fractures by slowing the loss of bone mass (osteoporosis).    Manage stress better.    Improve your sense of self and your body image.  Exercise tips      Ease into your routine. Set small goals. Then build on them. Talk with your healthcare provider first before starting an exercise routine if you're not sure what your activity level should be.    Exercise on most days. Aim for a total of at least 150 minutes (2 hours and 30 minutes) or more of moderate-intensity aerobic activity each week. You could also do 75 minutes (1 hour and 15 minutes) or more of vigorous-intensity aerobic activity each week. Or try for a combination of both. Moderate activity means that you breathe heavier and your heart rate increases, but you can still talk. Think about doing at least 30 minutes of moderate exercise, 5 times a week. It's OK to work up to the 30-minute period over time. Examples of moderate-intensity activity are brisk walking, gardening, and water aerobics.    Step up your daily activity level.  Along with your exercise program, try being more active the whole day. Walk instead of drive. Or park further away so that you take more steps each day. Do more household tasks or yard work. You may not be able to meet the advised amount of physical activity. But doing some moderate- or vigorous-intensity aerobic activity can help reduce your risk for heart disease. Your healthcare provider can help you figure out what is best for you.    Choose 1 or more activities you enjoy.  Walking is one of the easiest things you can do. You can also try swimming, riding a bike, dancing, or taking an exercise class.    Call 911  Call 911 right away if any of these occur:     Chest pain that doesn't go away quickly with rest    New burning, tightness, pressure, or heaviness in your chest, neck, shoulders, back, or  arms    Abnormal or severe shortness of breath    A very fast or irregular heartbeat (palpitations)    Fainting  When to call your healthcare provider  Call your healthcare provider if you have any of these:     Dizziness or lightheadedness    Mild shortness of breath or chest pain    Increased or new joint or muscle pain    [employer] last reviewed this educational content on 7/1/2022 2000-2022 [employer]. All rights reserved. This information is not intended as a substitute for professional medical care. Always follow your healthcare professional's instructions.        Activities of Daily Living    Your Health Risk Assessment indicates you have difficulties with activities of daily living such as housework, bathing, preparing meals, taking medication, etc. Please make a follow up appointment for us to address this issue in more detail.    Signs of Hearing Loss  Hearing loss is a problem shared by many people. In fact, it's one of the most common health problems, particularly as people age. Most people aged 65 and older have some hearing loss. By age 80, almost everyone does. Hearing loss often occurs slowly over the years. So, you may not realize your hearing has gotten worse.   When sudden hearing loss occurs, it's important to contact your healthcare provider right away. Your provider will do a medical exam and a hearing exam as soon as possible. This is to help find the cause and type of your sudden hearing loss. Based on your diagnosis, your healthcare provider will discuss possible treatments.      Hearing much better with one ear can be a sign of hearing loss.     Have your hearing checked  Call your healthcare provider if you:     Have to strain to hear normal conversation    Have to watch other people s faces very carefully to follow what they re saying    Need to ask people to repeat what they ve said    Often misunderstand what people are saying    Turn the volume of the television or  radio up so high that others complain    Feel that people are mumbling when they re talking to you    Find that the effort to hear leaves you feeling tired and irritated    Notice, when using the phone, that you hear better with one ear than the other  [employer] last reviewed this educational content on 6/1/2022 2000-2022 [employer]. All rights reserved. This information is not intended as a substitute for professional medical care. Always follow your healthcare professional's instructions.

## 2023-06-28 NOTE — PROGRESS NOTES
"SUBJECTIVE:   Negin is a 83 year old who presents for Preventive Visit.Are you in the first 12 months of your Medicare coverage?  NoNo    Healthy Habits:     In general, how would you rate your overall health?  Good    Frequency of exercise:  1 day/week    Duration of exercise:  Less than 15 minutes    Do you usually eat at least 4 servings of fruit and vegetables a day, include whole grains    & fiber and avoid regularly eating high fat or \"junk\" foods?  Yes    Taking medications regularly:  Yes    Medication side effects:  Muscle aches    Ability to successfully perform activities of daily living:  Telephone requires assistance, shopping requires assistance, housework requires assistance and laundry requires assistance    Home Safety:  No safety concerns identified    Hearing Impairment:  Difficulty following a conversation in a noisy restaurant or crowded room, feel that people are mumbling or not speaking clearly, difficulty following dialogue in the theater, need to ask people to speak up or repeat themselves, difficulty understanding soft or whispered speech and difficulty understanding speech on the telephone    In the past 6 months, have you been bothered by leaking of urine?  No    In general, how would you rate your overall mental or emotional health?  Good      PHQ-2 Total Score: 0    Additional concerns today:  No        Have you ever done Advance Care Planning? (For example, a Health Directive, POLST, or a discussion with a medical provider or your loved ones about your wishes): Yes, advance care planning is on file.       Fall risk  Fallen 2 or more times in the past year?: No  Any fall with injury in the past year?: No    Cognitive Screening   1) Repeat 3 items (Leader, Season, Table)    2) Clock draw: NORMAL  3) 3 item recall: Recalls 3 objects  Results: 3 items recalled: COGNITIVE IMPAIRMENT LESS LIKELY    Mini-CogTM Copyright NAKUL Rocha. Licensed by the author for use in [facility]; " reprinted with permission ([email or web address]). All rights reserved.          Reviewed and updated as needed this visit by clinical staff   Tobacco  Allergies  Meds              Reviewed and updated as needed this visit by Provider                 Social History     Tobacco Use     Smoking status: Former     Packs/day: 1.00     Years: 50.00     Pack years: 50.00     Types: Cigarettes     Start date: 3/14/1954     Quit date: 3/14/2004     Years since quittin.3     Smokeless tobacco: Never   Substance Use Topics     Alcohol use: No     Comment: rarely,socially/1/2 months           2023    11:05 AM   Alcohol Use   Prescreen: >3 drinks/day or >7 drinks/week? No     Do you have a current opioid prescription? No  Do you use any other controlled substances or medications that are not prescribed by a provider? None          Medication Followup of Lasix, Indomethacin, Spirolactone, Nystatin    Taking Medication as prescribed: yes    Side Effects:  None    Medication Helping Symptoms:  yes      Current providers sharing in care for this patient include:   Patient Care Team:  Hazel Young MD as PCP - General (Internal Medicine)  Hazel Young MD as Assigned PCP  Leah Lieberman AuD as Audiologist (Audiology)  Kosta Peters MD as Assigned Surgical Provider  Blanche Washington MD as Physician (Family Medicine)    The following health maintenance items are reviewed in Epic and correct as of today:  Health Maintenance   Topic Date Due     URINALYSIS  Never done     DTAP/TDAP/TD IMMUNIZATION (1 - Tdap) Never done     ZOSTER IMMUNIZATION (1 of 2) Never done     COVID-19 Vaccine (6 - Pfizer series) 2023     MICROALBUMIN  2023     MEDICARE ANNUAL WELLNESS VISIT  2023     A1C  2024     BMP  2024     LIPID  2024     TSH W/FREE T4 REFLEX  2024     HEMOGLOBIN  2024     ANNUAL REVIEW OF HM ORDERS  2024     FALL RISK ASSESSMENT  2024     ADVANCE CARE  "PLANNING  06/28/2028     DEXA  04/25/2032     PHQ-2 (once per calendar year)  Completed     INFLUENZA VACCINE  Completed     Pneumococcal Vaccine: 65+ Years  Completed     IPV IMMUNIZATION  Aged Out     MENINGITIS IMMUNIZATION  Aged Out     Labs reviewed in EPIC      Mammogram Screening - Mammography discussed and declined  Pertinent mammograms are reviewed under the imaging tab.    Review of Systems   Constitutional: Negative for chills.   HENT: Negative for congestion.    Respiratory: Negative for cough.    Cardiovascular: Negative for chest pain.   Gastrointestinal: Negative for abdominal pain, constipation and hematochezia.   Genitourinary: Negative for hematuria.         OBJECTIVE:   /78   Pulse 62   Temp 97  F (36.1  C) (Temporal)   Resp 16   Ht 1.613 m (5' 3.5\")   Wt 70.1 kg (154 lb 9.6 oz)   SpO2 97%   BMI 26.96 kg/m   Estimated body mass index is 26.96 kg/m  as calculated from the following:    Height as of this encounter: 1.613 m (5' 3.5\").    Weight as of this encounter: 70.1 kg (154 lb 9.6 oz).  Physical Exam  GENERAL: healthy, alert and no distress  EYES: Eyes grossly normal to inspection, PERRL and conjunctivae and sclerae normal  Neck: No visible JVD or lymphadenopathy   RESP: symmetrical rise in chest   CV: No peripheral edema notable   MS: no gross musculoskeletal defects noted  SKIN: no suspicious lesions or rashes  PSYCH: mentation appears normal, affect normal/bright      Diagnostic Test Results:  Labs reviewed in Epic  No results found for any visits on 06/28/23.    ASSESSMENT / PLAN:   (Z00.00) Encounter for Medicare annual wellness exam  (primary encounter diagnosis)  Comment: Stable  Plan: REVIEW OF HEALTH MAINTENANCE PROTOCOL ORDERS,         PRIMARY CARE FOLLOW-UP SCHEDULING, REVIEW OF         HEALTH MAINTENANCE PROTOCOL ORDERS            (M10.9) Acute gout involving toe of right foot, unspecified cause  Comment: Chronic, stable. Will refill medication if patient does experience " a gouty attack. Used PRN  Plan: indomethacin (INDOCIN) 25 MG capsule            (I10) Essential hypertension with goal blood pressure less than 140/90  Comment: borderline normal, continue same medication.   Plan: spironolactone (ALDACTONE) 25 MG tablet,         furosemide (LASIX) 20 MG tablet            (L20.9) Atopic dermatitis, unspecified type  Comment: Chronic, stable   Plan: nystatin (MYCOSTATIN) 040460 UNIT/GM external         powder        Refill sent to pharmacy       Patient has been advised of split billing requirements and indicates understanding: Yes      COUNSELING:  Reviewed preventive health counseling, as reflected in patient instructions        She reports that she quit smoking about 19 years ago. Her smoking use included cigarettes. She started smoking about 69 years ago. She has a 50.00 pack-year smoking history. She has never used smokeless tobacco.      Appropriate preventive services were discussed with this patient, including applicable screening as appropriate for cardiovascular disease, diabetes, osteopenia/osteoporosis, and glaucoma.  As appropriate for age/gender, discussed screening for colorectal cancer, prostate cancer, breast cancer, and cervical cancer. Checklist reviewing preventive services available has been given to the patient.    Reviewed patients plan of care and provided an AVS. The Basic Care Plan (routine screening as documented in Health Maintenance) for Negin meets the Care Plan requirement. This Care Plan has been established and reviewed with the Patient.          ISABEL VINSON MD  [facility]    Identified Health Risks:    I have reviewed Opioid Use Disorder and Substance Use Disorder risk factors and made any needed referrals.

## 2023-09-11 ENCOUNTER — TELEPHONE (OUTPATIENT)
Dept: AUDIOLOGY | Facility: CLINIC | Age: 83
End: 2023-09-11
Payer: MEDICARE

## 2023-09-11 NOTE — TELEPHONE ENCOUNTER
Received voicemail from patient's :  Patient wants to set up appointment and will call the call center.  Patient needs new cochlear implant batteries.  Patient wants other equipment checked before warranty expires.    Returned call and left message.  Patient is scheduled in December 2023.  Batteries only have 1 year warranty and hers are out of warranty so patient should contact [employer] to order replacements through insurance.  She can call them now and does not need to wait for clinic appointment to submit battery order.   Patient received her current processor in November 2021 and it has a 3 year warranty so her processor will still be in warranty when we meet in December 2023 if any replacements are needed.    Betzy Hawkins, CCC-A, [facility]  Licensed Audiologist  [place] #[id number]

## 2023-10-02 ENCOUNTER — IMMUNIZATION (OUTPATIENT)
Dept: FAMILY MEDICINE | Facility: CLINIC | Age: 83
End: 2023-10-02
Payer: MEDICARE

## 2023-10-02 DIAGNOSIS — Z23 NEED FOR PROPHYLACTIC VACCINATION AND INOCULATION AGAINST INFLUENZA: Primary | ICD-10-CM

## 2023-10-02 PROCEDURE — G0008 ADMIN INFLUENZA VIRUS VAC: HCPCS

## 2023-10-02 PROCEDURE — 99207 PR NO CHARGE NURSE ONLY: CPT

## 2023-10-02 PROCEDURE — 90662 IIV NO PRSV INCREASED AG IM: CPT

## 2023-10-09 ENCOUNTER — IMMUNIZATION (OUTPATIENT)
Dept: FAMILY MEDICINE | Facility: CLINIC | Age: 83
End: 2023-10-09
Payer: MEDICARE

## 2023-10-09 DIAGNOSIS — Z23 HIGH PRIORITY FOR 2019-NCOV VACCINE: Primary | ICD-10-CM

## 2023-10-09 PROCEDURE — 90480 ADMN SARSCOV2 VAC 1/ONLY CMP: CPT

## 2023-10-09 PROCEDURE — 99207 PR NO CHARGE NURSE ONLY: CPT

## 2023-10-09 PROCEDURE — 91320 SARSCV2 VAC 30MCG TRS-SUC IM: CPT

## 2023-10-26 DIAGNOSIS — Z96.21 COCHLEAR IMPLANT IN PLACE: Primary | ICD-10-CM

## 2023-12-06 NOTE — PROGRESS NOTES
AUDIOLOGY REPORT    BACKGROUND INFORMATION: Dr. Luis Dillon implanted Negin Magallon with a right  Nucleus  cochlear implant 08/2/2010 but had device failure, was explanted and reimplanted on 06/05/2011 with the same  device, as the recall was in September 2011.  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  She is being seen 12/11/2023 in Audiology at the [facility] for cochlear implant follow up and testing.  She was accompanied by her  Alvarado.  Today's visit was ordered by Jade Chopra MD.      PATIENT REPORT: Patient was last seen 2 years ago. She reports increasing difficulty understanding her .      TEST RESULTS:   Otoscopy: Right cerumen impaction, Left canal clear of cerumen    Tympanograms: Shallow bilaterally (consistent with past recordings) without otologic symptoms; Right recording may be affected by cerumen.  Patient also has cold symptoms today.  No ear pain/symptoms.      Unaided: Did not test due to cerumen in right ear.  Pre-operative testing showed profound sensorineural hearing loss in both ears on 12/21/09.     35 minutes were spent assessing the patient s auditory rehabilitation status.      Device(s) used for Testing: N7 - #1/2 magnet without irritation or discomfort    Soundfield Thresholds: Detection in normal to mild hearing loss range - stable     CNC Words Test:  The patient repeats 25 single syllable words, auditory only. The words are presented at 60 dB A (conversational level) delivered from a CD player.    Preoperative Performance:  Right ear aided: 0%    3 months Post-Activation of CI: Right: 62 % (52 % with the previous device)  6 months Post-Activation:  68 %  12 Months  Post-Activation: 64 %  5 Years Post-Activation:  60% new; and 40% previous map  ~6 years Post-Activation: 60%  10.5 years Post-Activation: 72% - slightly improved  12.5 years Post-Activation (Today): 76% - stable      The Hearing in Noise Test (HINT):  The patient repeats 20 sentences, auditory only.   The sentences are presented in each condition at 60 dB A(conversational level)delivered from a CD player.    Preoperative Performance:  Preoperative Performance:  Right ear aided: 0%    3 months Post-Activation of CI: Right: 89 % quiet  67 % in S/N 10 (41 % with the previous device)  6 months Post-Activation:  DNT  12 Months Post-Activation: 88 %  5 Years:  DNT  ~6 years Post-Activation: DNT   10.5 years Post-Activation: 86% - stable  12.5 years Post-Activation (Today): 89% - stable      AzBio Sentences Test:  The patient repeats 20 sentences, auditory only.  The sentences are presented at 60 dB A (conversational level) delivered from a CD player.    Preoperative Performance:  Right ear aided: DNT  3 months Post-Activation of CI: Right: 71 %  6 months Post-Activation:  82 %  12 Months Post-Activation:78 % quiet; 57 % in noise (signal/noise 10)  5 Years Post-Activation:  77% new and 60% previous  ~6 years Post-Activation: 59%  This examiner is not sure why the decrease but it may be due to the change today.  10.5 years Post-Activation: 58%; Stable compared to last testing.  Likely affected by processing speed since patient performs better on single words.     12.5 years Post-Activation (Today): 50% - slightly decreased; Has been decreasing over last few years. Likely affected by processing speed since patient performs better on single words and because patient consistently does not repeat the start of each sentence (misses first few words).        COMMENTS:  Impedances were stable and normal.  Since a full reprogramming session was not completed, programming charges were modified.     We had a lengthy discussion about  how the difficulty on AzBio may be related to processing speed rather than hearing given that audibility, CNC and HINT scores are stable.  AzBio scores are generally expected to be higher than CNC, which they are not for Negin, suggesting possible processing effects.  Therefore, we talked at length about use of communication strategies, including slowing down speech.  A handout on communication strategies was provided.        SUMMARY AND RECOMMENDATIONS: Ms Magallon has been using her cochlear implant for 12.5 years and her N7 processor for 2 years.  Cochlear implant impedances are stable.  Audibility with N7 is good and stable compared to last testing.  Speech perception testing scores are stable for CNC and HINT and slightly decreased for AzBio, likely affected by speed of processing.  Discussed use of communication strategies.  Testing should be repeated in 1-2 years or sooner if changes are noted.      Tympanograms were shallow bilaterally (consistent with past recordings) without otologic symptoms.  Patient has cerumen impaction in the right ear.  She previously saw Dr. Peters at [facility] in [place] for an ear cleaning and will schedule another follow up there.  Middle ear status can be assessed in ENT at that time.      The patient expressed understanding and agreement with this plan.    Betzy Hawkins, CCC-A, [facility]  Licensed Audiologist  [place] #[id number]    Enclosure: audiogram

## 2023-12-11 ENCOUNTER — OFFICE VISIT (OUTPATIENT)
Dept: AUDIOLOGY | Facility: CLINIC | Age: 83
End: 2023-12-11
Attending: OTOLARYNGOLOGY
Payer: MEDICARE

## 2023-12-11 DIAGNOSIS — Z96.21 COCHLEAR IMPLANT IN PLACE: ICD-10-CM

## 2023-12-11 DIAGNOSIS — H90.3 SENSORINEURAL HEARING LOSS (SNHL) OF BOTH EARS: Primary | ICD-10-CM

## 2023-12-11 PROCEDURE — 92626 EVAL AUD FUNCJ 1ST HOUR: CPT | Mod: 59 | Performed by: AUDIOLOGIST-HEARING AID FITTER

## 2023-12-11 PROCEDURE — 92604 REPROGRAM COCHLEAR IMPLT 7/>: CPT | Mod: 52 | Performed by: AUDIOLOGIST-HEARING AID FITTER

## 2023-12-11 PROCEDURE — 92567 TYMPANOMETRY: CPT | Mod: 59 | Performed by: AUDIOLOGIST-HEARING AID FITTER

## 2024-03-15 ENCOUNTER — TRANSFERRED RECORDS (OUTPATIENT)
Dept: HEALTH INFORMATION MANAGEMENT | Facility: CLINIC | Age: 84
End: 2024-03-15
Payer: MEDICARE

## 2024-04-10 ENCOUNTER — TRANSFERRED RECORDS (OUTPATIENT)
Dept: HEALTH INFORMATION MANAGEMENT | Facility: CLINIC | Age: 84
End: 2024-04-10
Payer: MEDICARE

## 2024-05-13 DIAGNOSIS — I10 ESSENTIAL HYPERTENSION WITH GOAL BLOOD PRESSURE LESS THAN 140/90: ICD-10-CM

## 2024-05-13 DIAGNOSIS — E03.9 HYPOTHYROIDISM, UNSPECIFIED TYPE: ICD-10-CM

## 2024-05-14 RX ORDER — METOPROLOL TARTRATE 25 MG/1
25 TABLET, FILM COATED ORAL 2 TIMES DAILY
Qty: 180 TABLET | Refills: 3 | Status: SHIPPED | OUTPATIENT
Start: 2024-05-14

## 2024-05-14 RX ORDER — LEVOTHYROXINE SODIUM 50 UG/1
50 TABLET ORAL DAILY
Qty: 90 TABLET | Refills: 3 | Status: SHIPPED | OUTPATIENT
Start: 2024-05-14

## 2024-06-04 ENCOUNTER — TELEPHONE (OUTPATIENT)
Dept: FAMILY MEDICINE | Facility: CLINIC | Age: 84
End: 2024-06-04
Payer: MEDICARE

## 2024-06-04 DIAGNOSIS — R73.09 ELEVATED GLUCOSE: Primary | ICD-10-CM

## 2024-06-04 NOTE — TELEPHONE ENCOUNTER
No consent on file for spouse, she gave a verbal okay.    Patient is going to see Dr. Pedroza for annual wellness since Dr. Washington is on a leave. She is being seen for annual wellness 7/3/24. They are scheduled to have her labs drawn prior to the appt and are already scheduled for 6/26/24 to have these drawn.     Would Dr. Pedroza be willing to place annual lab work to be drawn before the visit?     Aisha Perkins RN on 6/4/2024 at 11:51 AM

## 2024-06-10 DIAGNOSIS — I10 ESSENTIAL HYPERTENSION WITH GOAL BLOOD PRESSURE LESS THAN 140/90: ICD-10-CM

## 2024-06-10 RX ORDER — SPIRONOLACTONE 25 MG/1
TABLET ORAL
Qty: 90 TABLET | Refills: 0 | Status: SHIPPED | OUTPATIENT
Start: 2024-06-10 | End: 2024-09-09

## 2024-06-18 ENCOUNTER — PATIENT OUTREACH (OUTPATIENT)
Dept: CARE COORDINATION | Facility: CLINIC | Age: 84
End: 2024-06-18
Payer: MEDICARE

## 2024-06-30 ENCOUNTER — HEALTH MAINTENANCE LETTER (OUTPATIENT)
Age: 84
End: 2024-06-30

## 2024-07-02 ENCOUNTER — PATIENT OUTREACH (OUTPATIENT)
Dept: CARE COORDINATION | Facility: CLINIC | Age: 84
End: 2024-07-02
Payer: MEDICARE

## 2024-08-28 DIAGNOSIS — I10 ESSENTIAL HYPERTENSION WITH GOAL BLOOD PRESSURE LESS THAN 140/90: ICD-10-CM

## 2024-08-28 RX ORDER — FUROSEMIDE 20 MG
20 TABLET ORAL DAILY
Qty: 90 TABLET | Refills: 0 | Status: SHIPPED | OUTPATIENT
Start: 2024-08-28

## 2024-08-30 NOTE — PROGRESS NOTES
History of Present Illness - Negin Magallon is a very pleasant 84 year old female here to see me in follow up from 1/24/2022, and was seen for the first time for ear check.  She has a long history of ear disease and was a long time patient of the [facility] group.      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  She had a cochlear implant placed in August 2010 and replaced in 2011, and the processor was upgraded in November of 2021.      At that point her LEFT canal appeared clear, and the RIGHT canal was severely impacted with cerumen. She was sent to me for cerumen removal.      Past Medical History -   Patient Active Problem List   Diagnosis    Meniere's disease    Cochlear implant in place    Hyperlipidemia LDL goal <130    Hypertension goal BP (blood pressure) < 140/90    DJD (degenerative joint disease)    Gout    Shoulder impingement    Adhesive capsulitis    Hypothyroidism due to acquired atrophy of thyroid    CKD (chronic kidney disease) stage 3, GFR 30-59 ml/min (H)    Elevated glucose    AD (atopic dermatitis)       Current Medications -   Current Outpatient Medications:     acetaminophen (TYLENOL) 325 MG tablet, Take 1-2 tablets by mouth every 6 hours as needed As needed, Disp: , Rfl:     benzonatate (TESSALON) 100 MG capsule, Take 1 capsule (100 mg) by mouth 3 times daily as needed for cough, Disp: 30 capsule, Rfl: 0    CALCIUM + D OR, 1 DAILY, Disp: , Rfl:     doxycycline monohydrate (ADOXA) 100 MG tablet, Take 1 tablet (100 mg) by mouth 2 times daily, Disp: 14 tablet, Rfl: 0    furosemide (LASIX) 20 MG tablet, TAKE 1 TABLET(20 MG) BY MOUTH DAILY, Disp: 90 tablet, Rfl: 0    guaiFENesin (ROBITUSSIN) 20 mg/mL liquid, Take 10 mLs (200 mg) by mouth every 4 hours as needed for cough, Disp: 118 mL, Rfl: 0    indomethacin (INDOCIN) 25 MG capsule, Take 1 capsule (25 mg) by mouth 2 times daily (with meals), Disp: 30 capsule, Rfl: 1     levothyroxine (SYNTHROID/LEVOTHROID) 50 MCG tablet, Take 1 tablet (50 mcg) by mouth daily, Disp: 90 tablet, Rfl: 3    meclizine (ANTIVERT) 25 MG tablet, Take 1 tablet (25 mg) by mouth 2 times daily, Disp: 60 tablet, Rfl: 0    metoprolol tartrate (LOPRESSOR) 25 MG tablet, Take 1 tablet (25 mg) by mouth 2 times daily, Disp: 180 tablet, Rfl: 3    nystatin (MYCOSTATIN) 045994 UNIT/GM external powder, Apply topically 2 times daily, Disp: 60 g, Rfl: 3    spironolactone (ALDACTONE) 25 MG tablet, TAKE 1 TABLET(25 MG) BY MOUTH DAILY, Disp: 90 tablet, Rfl: 0    Allergies -   Allergies   Allergen Reactions    Alendronate      Bone pain    Amlodipine Swelling     See late 2019 notes.     Amoxicillin Swelling     tongue    Colchicine      Loose stools    Lipitor [Atorvastatin Calcium]      Muscle aches.  Has also tried muvacor, provachol, lopid, and crestor with same results    Lisinopril Cough    Prevacid [Aspartame] Diarrhea    Advil [Ibuprofen] Rash     Face swelling    Losartan Rash    Prednisone Swelling and Rash     Medrol dose pack       Social History -   Social History     Socioeconomic History    Marital status:      Spouse name: Not on file    Number of children: Not on file    Years of education: Not on file    Highest education level: Not on file   Occupational History    Not on file   Tobacco Use    Smoking status: Former Smoker     Packs/day: 1.00     Years: 50.00     Pack years: 50.00     Types: Cigarettes     Start date: 3/14/1954     Quit date: 3/14/2004     Years since quittin.8    Smokeless tobacco: Never Used   Substance and Sexual Activity    Alcohol use: No     Comment: rarely,socially/1/2 months    Drug use: No    Sexual activity: Not Currently     Partners: Male   Other Topics Concern    Parent/sibling w/ CABG, MI or angioplasty before 65F 55M? No   Social History Narrative    Lives in home in one story house with  (he has CAD).   No children     Social Determinants of Health  "    Financial Resource Strain: Not on file   Food Insecurity: Not on file   Transportation Needs: Not on file   Physical Activity: Not on file   Stress: Not on file   Social Connections: Not on file   Intimate Partner Violence: Not on file   Housing Stability: Not on file       Family History -   Family History   Problem Relation Age of Onset    Diabetes Father        Review of Systems - As per HPI and PMHx, otherwise 10+ system review of the head and neck, and general constitution is negative.    Physical Exam  There were no vitals taken for this visit.  BP (!) 163/69   Pulse 71   Ht 1.619 m (5' 3.75\")   Wt 71.4 kg (157 lb 8 oz)   SpO2 94%   BMI 27.25 kg/m      General - The patient is well nourished and well developed, and appears to have good nutritional status.  Alert and oriented to person and place, answers questions and cooperates with examination appropriately.   Head and Face - Normocephalic and atraumatic, with no gross asymmetry noted of the contour of the facial features.  The facial nerve is intact, with strong symmetric movements.  Voice and Breathing - The patient was breathing comfortably without the use of accessory muscles. There was no wheezing, stridor, or stertor.  The patients voice was clear and strong, and had appropriate pitch and quality.  Eyes - Extraocular movements intact, and the pupils were reactive to light.  Sclera were not icteric or injected, conjunctiva were pink and moist.      Cerumen Removal    Physical Exam and Procedure  Ears - On examination of the ears, I found that the RIGHT  side had cerumen impaction.  Therefore, I positioned them in the examination chair in a semi-supine position, beginning with the right side.  I used the binocular surgical microscope to perform cerumen removal.  I began by using a cerumen loop to gently lift the edges of the cerumen mass away from the walls of the external canal.  Once I did this, I was able to suction away fragments of wax and " debris using suction.  Once the mass was loose enough, the entire plug was pulled from the canal.  The tympanic membrane was intact, no sign of perforation or middle ear effusion.    The LEFT canal was clear and healthy, and the LEFT tympanic membrane normal.      The RIGHT CI site is clean, dry, and healthy appearing.      A/P - Negin Magallon is a 84 year old female  (Z96.21) Cochlear implant in place  (primary encounter diagnosis)  (H61.21) Impacted cerumen of right ear    The patient has had their cerumen procedurally removed today.  I have discussed ear care at home, including avoiding qtips or any other object placed in the canal.  I have also discussed that over the counter cerumen kits like Debrox or Cerumenex can be useful.    If no other issues, follow up with me in one year for an ear check.

## 2024-09-05 ENCOUNTER — OFFICE VISIT (OUTPATIENT)
Dept: OTOLARYNGOLOGY | Facility: CLINIC | Age: 84
End: 2024-09-05
Payer: MEDICARE

## 2024-09-05 VITALS
DIASTOLIC BLOOD PRESSURE: 69 MMHG | SYSTOLIC BLOOD PRESSURE: 163 MMHG | WEIGHT: 157.5 LBS | HEIGHT: 64 IN | OXYGEN SATURATION: 94 % | HEART RATE: 71 BPM | BODY MASS INDEX: 26.89 KG/M2

## 2024-09-05 DIAGNOSIS — H61.21 IMPACTED CERUMEN OF RIGHT EAR: ICD-10-CM

## 2024-09-05 DIAGNOSIS — Z96.21 COCHLEAR IMPLANT IN PLACE: Primary | ICD-10-CM

## 2024-09-05 PROCEDURE — 69210 REMOVE IMPACTED EAR WAX UNI: CPT | Performed by: OTOLARYNGOLOGY

## 2024-09-05 PROCEDURE — 99207 PR DROP WITH A PROCEDURE: CPT | Mod: 25 | Performed by: OTOLARYNGOLOGY

## 2024-09-05 NOTE — LETTER
9/5/2024      Negin Magallon  [address] [facility] [address]      Dear Colleague,    Thank you for referring your patient, Negin Magallon, to the [facility]. Please see a copy of my visit note below.    History of Present Illness - Negin Magallon is a very pleasant 84 year old female here to see me in follow up from 1/24/2022, and was seen for the first time for ear check.  She has a long history of ear disease and was a long time patient of the [facility] group.      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  She had a cochlear implant placed in August 2010 and replaced in 2011, and the processor was upgraded in November of 2021.      At that point her LEFT canal appeared clear, and the RIGHT canal was severely impacted with cerumen. She was sent to me for cerumen removal.      Past Medical History -   Patient Active Problem List   Diagnosis     Meniere's disease     Cochlear implant in place     Hyperlipidemia LDL goal <130     Hypertension goal BP (blood pressure) < 140/90     DJD (degenerative joint disease)     Gout     Shoulder impingement     Adhesive capsulitis     Hypothyroidism due to acquired atrophy of thyroid     CKD (chronic kidney disease) stage 3, GFR 30-59 ml/min (H)     Elevated glucose     AD (atopic dermatitis)       Current Medications -   Current Outpatient Medications:      acetaminophen (TYLENOL) 325 MG tablet, Take 1-2 tablets by mouth every 6 hours as needed As needed, Disp: , Rfl:      benzonatate (TESSALON) 100 MG capsule, Take 1 capsule (100 mg) by mouth 3 times daily as needed for cough, Disp: 30 capsule, Rfl: 0     CALCIUM + D OR, 1 DAILY, Disp: , Rfl:      doxycycline monohydrate (ADOXA) 100 MG tablet, Take 1 tablet (100 mg) by mouth 2 times daily, Disp: 14 tablet, Rfl: 0     furosemide (LASIX) 20 MG tablet, TAKE 1 TABLET(20 MG) BY MOUTH DAILY, Disp: 90  tablet, Rfl: 0     guaiFENesin (ROBITUSSIN) 20 mg/mL liquid, Take 10 mLs (200 mg) by mouth every 4 hours as needed for cough, Disp: 118 mL, Rfl: 0     indomethacin (INDOCIN) 25 MG capsule, Take 1 capsule (25 mg) by mouth 2 times daily (with meals), Disp: 30 capsule, Rfl: 1     levothyroxine (SYNTHROID/LEVOTHROID) 50 MCG tablet, Take 1 tablet (50 mcg) by mouth daily, Disp: 90 tablet, Rfl: 3     meclizine (ANTIVERT) 25 MG tablet, Take 1 tablet (25 mg) by mouth 2 times daily, Disp: 60 tablet, Rfl: 0     metoprolol tartrate (LOPRESSOR) 25 MG tablet, Take 1 tablet (25 mg) by mouth 2 times daily, Disp: 180 tablet, Rfl: 3     nystatin (MYCOSTATIN) 917933 UNIT/GM external powder, Apply topically 2 times daily, Disp: 60 g, Rfl: 3     spironolactone (ALDACTONE) 25 MG tablet, TAKE 1 TABLET(25 MG) BY MOUTH DAILY, Disp: 90 tablet, Rfl: 0    Allergies -   Allergies   Allergen Reactions     Alendronate      Bone pain     Amlodipine Swelling     See late 2019 notes.      Amoxicillin Swelling     tongue     Colchicine      Loose stools     Lipitor [Atorvastatin Calcium]      Muscle aches.  Has also tried muvacor, provachol, lopid, and crestor with same results     Lisinopril Cough     Prevacid [Aspartame] Diarrhea     Advil [Ibuprofen] Rash     Face swelling     Losartan Rash     Prednisone Swelling and Rash     Medrol dose pack       Social History -   Social History     Socioeconomic History     Marital status:      Spouse name: Not on file     Number of children: Not on file     Years of education: Not on file     Highest education level: Not on file   Occupational History     Not on file   Tobacco Use     Smoking status: Former Smoker     Packs/day: 1.00     Years: 50.00     Pack years: 50.00     Types: Cigarettes     Start date: 3/14/1954     Quit date: 3/14/2004     Years since quittin.8     Smokeless tobacco: Never Used   Substance and Sexual Activity     Alcohol use: No     Comment: rarely,socially/1/2 months      "Drug use: No     Sexual activity: Not Currently     Partners: Male   Other Topics Concern     Parent/sibling w/ CABG, MI or angioplasty before 65F 55M? No   Social History Narrative    Lives in home in one story house with  (he has CAD).   No children     Social Determinants of Health     Financial Resource Strain: Not on file   Food Insecurity: Not on file   Transportation Needs: Not on file   Physical Activity: Not on file   Stress: Not on file   Social Connections: Not on file   Intimate Partner Violence: Not on file   Housing Stability: Not on file       Family History -   Family History   Problem Relation Age of Onset     Diabetes Father        Review of Systems - As per HPI and PMHx, otherwise 10+ system review of the head and neck, and general constitution is negative.    Physical Exam  There were no vitals taken for this visit.  BP (!) 163/69   Pulse 71   Ht 1.619 m (5' 3.75\")   Wt 71.4 kg (157 lb 8 oz)   SpO2 94%   BMI 27.25 kg/m      General - The patient is well nourished and well developed, and appears to have good nutritional status.  Alert and oriented to person and place, answers questions and cooperates with examination appropriately.   Head and Face - Normocephalic and atraumatic, with no gross asymmetry noted of the contour of the facial features.  The facial nerve is intact, with strong symmetric movements.  Voice and Breathing - The patient was breathing comfortably without the use of accessory muscles. There was no wheezing, stridor, or stertor.  The patients voice was clear and strong, and had appropriate pitch and quality.  Eyes - Extraocular movements intact, and the pupils were reactive to light.  Sclera were not icteric or injected, conjunctiva were pink and moist.      Cerumen Removal    Physical Exam and Procedure  Ears - On examination of the ears, I found that the RIGHT  side had cerumen impaction.  Therefore, I positioned them in the examination chair in a semi-supine " position, beginning with the right side.  I used the binocular surgical microscope to perform cerumen removal.  I began by using a cerumen loop to gently lift the edges of the cerumen mass away from the walls of the external canal.  Once I did this, I was able to suction away fragments of wax and debris using suction.  Once the mass was loose enough, the entire plug was pulled from the canal.  The tympanic membrane was intact, no sign of perforation or middle ear effusion.    The LEFT canal was clear and healthy, and the LEFT tympanic membrane normal.      The RIGHT CI site is clean, dry, and healthy appearing.      A/P - Negin Magallon is a 84 year old female  (Z96.21) Cochlear implant in place  (primary encounter diagnosis)  (H61.21) Impacted cerumen of right ear    The patient has had their cerumen procedurally removed today.  I have discussed ear care at home, including avoiding qtips or any other object placed in the canal.  I have also discussed that over the counter cerumen kits like Debrox or Cerumenex can be useful.    If no other issues, follow up with me in one year for an ear check.        Again, thank you for allowing me to participate in the care of your patient.        Sincerely,        Kosta Peters MD

## 2024-09-06 ENCOUNTER — IMMUNIZATION (OUTPATIENT)
Dept: FAMILY MEDICINE | Facility: CLINIC | Age: 84
End: 2024-09-06
Payer: MEDICARE

## 2024-09-06 DIAGNOSIS — Z23 NEED FOR PROPHYLACTIC VACCINATION AND INOCULATION AGAINST INFLUENZA: Primary | ICD-10-CM

## 2024-09-06 PROCEDURE — 99207 PR NO CHARGE NURSE ONLY: CPT

## 2024-09-06 PROCEDURE — 90662 IIV NO PRSV INCREASED AG IM: CPT

## 2024-09-06 PROCEDURE — G0008 ADMIN INFLUENZA VIRUS VAC: HCPCS

## 2024-09-07 DIAGNOSIS — I10 ESSENTIAL HYPERTENSION WITH GOAL BLOOD PRESSURE LESS THAN 140/90: ICD-10-CM

## 2024-09-08 ENCOUNTER — HEALTH MAINTENANCE LETTER (OUTPATIENT)
Age: 84
End: 2024-09-08

## 2024-09-09 RX ORDER — SPIRONOLACTONE 25 MG/1
TABLET ORAL
Qty: 90 TABLET | Refills: 0 | Status: SHIPPED | OUTPATIENT
Start: 2024-09-09

## 2024-10-01 ENCOUNTER — IMMUNIZATION (OUTPATIENT)
Dept: FAMILY MEDICINE | Facility: CLINIC | Age: 84
End: 2024-10-01
Payer: MEDICARE

## 2024-10-01 PROCEDURE — 90480 ADMN SARSCOV2 VAC 1/ONLY CMP: CPT

## 2024-10-01 PROCEDURE — 91320 SARSCV2 VAC 30MCG TRS-SUC IM: CPT

## 2024-10-11 ENCOUNTER — TELEPHONE (OUTPATIENT)
Dept: FAMILY MEDICINE | Facility: CLINIC | Age: 84
End: 2024-10-11
Payer: MEDICARE

## 2024-10-11 NOTE — TELEPHONE ENCOUNTER
Patient Quality Outreach    Patient is due for the following:   Physical Annual Wellness Visit    Next Steps:   Patient has upcoming appointment, these items will be addressed at that time.    Type of outreach:    Chart review performed, no outreach needed.    Next Steps:  Reach out within 90 days via [email or web address].    Max number of attempts reached: No. Will try again in 90 days if patient still on fail list.    Questions for provider review:    None           Lexie Ramirez MA

## 2024-10-31 ENCOUNTER — TELEPHONE (OUTPATIENT)
Dept: FAMILY MEDICINE | Facility: CLINIC | Age: 84
End: 2024-10-31

## 2024-10-31 ENCOUNTER — OFFICE VISIT (OUTPATIENT)
Dept: FAMILY MEDICINE | Facility: CLINIC | Age: 84
End: 2024-10-31
Payer: MEDICARE

## 2024-10-31 VITALS
WEIGHT: 157.2 LBS | TEMPERATURE: 97.3 F | RESPIRATION RATE: 16 BRPM | OXYGEN SATURATION: 98 % | BODY MASS INDEX: 27.85 KG/M2 | HEIGHT: 63 IN | HEART RATE: 61 BPM | SYSTOLIC BLOOD PRESSURE: 189 MMHG | DIASTOLIC BLOOD PRESSURE: 74 MMHG

## 2024-10-31 DIAGNOSIS — E78.5 HYPERLIPIDEMIA LDL GOAL <130: ICD-10-CM

## 2024-10-31 DIAGNOSIS — N18.31 STAGE 3A CHRONIC KIDNEY DISEASE (H): ICD-10-CM

## 2024-10-31 DIAGNOSIS — Z00.00 WELL ADULT EXAM: Primary | ICD-10-CM

## 2024-10-31 DIAGNOSIS — E03.9 HYPOTHYROIDISM, UNSPECIFIED TYPE: ICD-10-CM

## 2024-10-31 DIAGNOSIS — E03.4 HYPOTHYROIDISM DUE TO ACQUIRED ATROPHY OF THYROID: ICD-10-CM

## 2024-10-31 DIAGNOSIS — N18.32 STAGE 3B CHRONIC KIDNEY DISEASE (H): ICD-10-CM

## 2024-10-31 DIAGNOSIS — Z23 NEED FOR SHINGLES VACCINE: ICD-10-CM

## 2024-10-31 DIAGNOSIS — R73.09 ELEVATED GLUCOSE: ICD-10-CM

## 2024-10-31 DIAGNOSIS — Z29.11 NEED FOR VACCINATION AGAINST RESPIRATORY SYNCYTIAL VIRUS: ICD-10-CM

## 2024-10-31 DIAGNOSIS — Z13.6 ENCOUNTER FOR LIPID SCREENING FOR CARDIOVASCULAR DISEASE: ICD-10-CM

## 2024-10-31 DIAGNOSIS — L20.9 ATOPIC DERMATITIS, UNSPECIFIED TYPE: ICD-10-CM

## 2024-10-31 DIAGNOSIS — M10.9 ACUTE GOUT INVOLVING TOE OF RIGHT FOOT, UNSPECIFIED CAUSE: ICD-10-CM

## 2024-10-31 DIAGNOSIS — R29.6 FREQUENT FALLS: ICD-10-CM

## 2024-10-31 DIAGNOSIS — R26.89 BALANCE PROBLEMS: ICD-10-CM

## 2024-10-31 DIAGNOSIS — I10 ESSENTIAL HYPERTENSION WITH GOAL BLOOD PRESSURE LESS THAN 140/90: ICD-10-CM

## 2024-10-31 DIAGNOSIS — Z23 NEED FOR TDAP VACCINATION: ICD-10-CM

## 2024-10-31 DIAGNOSIS — R73.9 HYPERGLYCEMIA: ICD-10-CM

## 2024-10-31 DIAGNOSIS — Z13.220 ENCOUNTER FOR LIPID SCREENING FOR CARDIOVASCULAR DISEASE: ICD-10-CM

## 2024-10-31 LAB
ALBUMIN SERPL BCG-MCNC: 4.8 G/DL (ref 3.5–5.2)
ALBUMIN UR-MCNC: NEGATIVE MG/DL
ALP SERPL-CCNC: 88 U/L (ref 40–150)
ALT SERPL W P-5'-P-CCNC: 12 U/L (ref 0–50)
ANION GAP SERPL CALCULATED.3IONS-SCNC: 14 MMOL/L (ref 7–15)
APPEARANCE UR: CLEAR
AST SERPL W P-5'-P-CCNC: 19 U/L (ref 0–45)
BILIRUB SERPL-MCNC: 0.4 MG/DL
BILIRUB UR QL STRIP: NEGATIVE
BUN SERPL-MCNC: 32.7 MG/DL (ref 8–23)
CALCIUM SERPL-MCNC: 10.3 MG/DL (ref 8.8–10.4)
CHLORIDE SERPL-SCNC: 96 MMOL/L (ref 98–107)
CHOLEST SERPL-MCNC: 319 MG/DL
COLOR UR AUTO: YELLOW
CREAT SERPL-MCNC: 1.65 MG/DL (ref 0.51–0.95)
EGFRCR SERPLBLD CKD-EPI 2021: 31 ML/MIN/1.73M2
ERYTHROCYTE [DISTWIDTH] IN BLOOD BY AUTOMATED COUNT: 12.7 % (ref 10–15)
EST. AVERAGE GLUCOSE BLD GHB EST-MCNC: 128 MG/DL
FASTING STATUS PATIENT QL REPORTED: YES
FASTING STATUS PATIENT QL REPORTED: YES
GLUCOSE SERPL-MCNC: 129 MG/DL (ref 70–99)
GLUCOSE UR STRIP-MCNC: NEGATIVE MG/DL
HBA1C MFR BLD: 6.1 % (ref 0–5.6)
HCO3 SERPL-SCNC: 27 MMOL/L (ref 22–29)
HCT VFR BLD AUTO: 37.8 % (ref 35–47)
HDLC SERPL-MCNC: 41 MG/DL
HGB BLD-MCNC: 12.1 G/DL (ref 11.7–15.7)
HGB UR QL STRIP: NEGATIVE
HOLD SPECIMEN: NORMAL
KETONES UR STRIP-MCNC: NEGATIVE MG/DL
LDLC SERPL CALC-MCNC: 209 MG/DL
LEUKOCYTE ESTERASE UR QL STRIP: NEGATIVE
MCH RBC QN AUTO: 31.8 PG (ref 26.5–33)
MCHC RBC AUTO-ENTMCNC: 32 G/DL (ref 31.5–36.5)
MCV RBC AUTO: 100 FL (ref 78–100)
NITRATE UR QL: NEGATIVE
NONHDLC SERPL-MCNC: 278 MG/DL
PH UR STRIP: 5.5 [PH] (ref 5–7)
PLATELET # BLD AUTO: 236 10E3/UL (ref 150–450)
POTASSIUM SERPL-SCNC: 4.5 MMOL/L (ref 3.4–5.3)
PROT SERPL-MCNC: 7.7 G/DL (ref 6.4–8.3)
RBC # BLD AUTO: 3.8 10E6/UL (ref 3.8–5.2)
SODIUM SERPL-SCNC: 137 MMOL/L (ref 135–145)
SP GR UR STRIP: 1.01 (ref 1–1.03)
TRIGL SERPL-MCNC: 344 MG/DL
TSH SERPL DL<=0.005 MIU/L-ACNC: 0.84 UIU/ML (ref 0.3–4.2)
UROBILINOGEN UR STRIP-ACNC: 0.2 E.U./DL
WBC # BLD AUTO: 7.3 10E3/UL (ref 4–11)

## 2024-10-31 PROCEDURE — 83550 IRON BINDING TEST: CPT | Performed by: FAMILY MEDICINE

## 2024-10-31 PROCEDURE — 80053 COMPREHEN METABOLIC PANEL: CPT | Performed by: FAMILY MEDICINE

## 2024-10-31 PROCEDURE — 85027 COMPLETE CBC AUTOMATED: CPT | Performed by: FAMILY MEDICINE

## 2024-10-31 PROCEDURE — 84443 ASSAY THYROID STIM HORMONE: CPT | Performed by: FAMILY MEDICINE

## 2024-10-31 PROCEDURE — 80061 LIPID PANEL: CPT | Performed by: FAMILY MEDICINE

## 2024-10-31 PROCEDURE — 99214 OFFICE O/P EST MOD 30 MIN: CPT | Mod: 25 | Performed by: FAMILY MEDICINE

## 2024-10-31 PROCEDURE — 82728 ASSAY OF FERRITIN: CPT | Performed by: FAMILY MEDICINE

## 2024-10-31 PROCEDURE — 36415 COLL VENOUS BLD VENIPUNCTURE: CPT | Performed by: FAMILY MEDICINE

## 2024-10-31 PROCEDURE — 82570 ASSAY OF URINE CREATININE: CPT | Performed by: FAMILY MEDICINE

## 2024-10-31 PROCEDURE — 83036 HEMOGLOBIN GLYCOSYLATED A1C: CPT | Performed by: FAMILY MEDICINE

## 2024-10-31 PROCEDURE — 81003 URINALYSIS AUTO W/O SCOPE: CPT | Performed by: FAMILY MEDICINE

## 2024-10-31 PROCEDURE — 99397 PER PM REEVAL EST PAT 65+ YR: CPT | Performed by: FAMILY MEDICINE

## 2024-10-31 PROCEDURE — 82043 UR ALBUMIN QUANTITATIVE: CPT | Performed by: FAMILY MEDICINE

## 2024-10-31 PROCEDURE — 83540 ASSAY OF IRON: CPT | Performed by: FAMILY MEDICINE

## 2024-10-31 RX ORDER — SPIRONOLACTONE 25 MG/1
TABLET ORAL
Qty: 90 TABLET | Refills: 1 | Status: SHIPPED | OUTPATIENT
Start: 2024-10-31

## 2024-10-31 RX ORDER — NYSTATIN 100000 [USP'U]/G
POWDER TOPICAL 2 TIMES DAILY
Qty: 60 G | Refills: 3 | Status: SHIPPED | OUTPATIENT
Start: 2024-10-31

## 2024-10-31 RX ORDER — FUROSEMIDE 20 MG/1
20 TABLET ORAL DAILY
Qty: 90 TABLET | Refills: 1 | Status: SHIPPED | OUTPATIENT
Start: 2024-10-31

## 2024-10-31 RX ORDER — LEVOTHYROXINE SODIUM 50 UG/1
50 TABLET ORAL DAILY
Qty: 90 TABLET | Refills: 3 | Status: SHIPPED | OUTPATIENT
Start: 2024-10-31

## 2024-10-31 RX ORDER — METOPROLOL TARTRATE 25 MG/1
25 TABLET, FILM COATED ORAL 2 TIMES DAILY
Qty: 180 TABLET | Refills: 3 | Status: SHIPPED | OUTPATIENT
Start: 2024-10-31

## 2024-10-31 RX ORDER — INDOMETHACIN 25 MG/1
25 CAPSULE ORAL 2 TIMES DAILY WITH MEALS
Qty: 30 CAPSULE | Refills: 1 | Status: SHIPPED | OUTPATIENT
Start: 2024-10-31

## 2024-10-31 SDOH — HEALTH STABILITY: PHYSICAL HEALTH: ON AVERAGE, HOW MANY DAYS PER WEEK DO YOU ENGAGE IN MODERATE TO STRENUOUS EXERCISE (LIKE A BRISK WALK)?: 0 DAYS

## 2024-10-31 ASSESSMENT — SOCIAL DETERMINANTS OF HEALTH (SDOH): HOW OFTEN DO YOU GET TOGETHER WITH FRIENDS OR RELATIVES?: NEVER

## 2024-10-31 NOTE — TELEPHONE ENCOUNTER
PA Initiation    Medication: INDOMETHACIN 25 MG PO CAPS  Insurance Company: [employer] - Phone [phone] Fax [phone]  Pharmacy Filling the Rx: [id number] - [place] - [facility] AT [facility] [address]  Filling Pharmacy Phone: [phone]  Filling Pharmacy Fax: [phone]  Start Date: 10/31/2024

## 2024-10-31 NOTE — TELEPHONE ENCOUNTER
Prior Authorization Retail Medication Request    Medication/Dose: Indomethacin 25mg Capsules  Diagnosis and ICD code (if different than what is on RX):    New/renewal/insurance change PA/secondary ins. PA:  Previously Tried and Failed:    Rationale:      Insurance   Primary:   Insurance ID:      Secondary (if applicable):  Insurance ID:      Pharmacy Information (if different than what is on RX)  Name:  [facility]   Phone:  [phone]  Fax:[phone]    Clinic Information  Preferred routing pool for dept communication:     [facility]

## 2024-10-31 NOTE — PROGRESS NOTES
Preventive Care Visit  [facility] JOHN Woods MD, Family Medicine  Oct 31, 2024      Assessment & Plan       ICD-10-CM    1. Well adult exam  Z00.00 Comprehensive metabolic panel     Comprehensive metabolic panel      2. Need for shingles vaccine  Z23 zoster vaccine recombinant adjuvanted (SHINGRIX) injection      3. Need for Tdap vaccination  Z23 Tdap, tetanus-diptheria-acell pertussis, (BOOSTRIX) 5-2.5-18.5 LF-MCG/0.5 PUJA injection      4. Need for vaccination against respiratory syncytial virus  Z29.11 RSV vaccine, bivalent, ABRYSVO, injection      5. Stage 3a chronic kidney disease (H)  N18.31 UA Macroscopic with reflex to Microscopic and Culture     CBC with Platelets and Reflex to Iron Studies     UA Macroscopic with reflex to Microscopic and Culture     CBC with Platelets and Reflex to Iron Studies     Iron & Iron Binding Capacity     Ferritin      6. Encounter for lipid screening for cardiovascular disease  Z13.220 Lipid panel reflex to direct LDL Fasting    Z13.6 CANCELED: Lipid panel reflex to direct LDL Fasting      7. Hypothyroidism, unspecified type  E03.9 TSH with free T4 reflex     levothyroxine (SYNTHROID/LEVOTHROID) 50 MCG tablet     CANCELED: TSH with free T4 reflex      8. Stage 3b chronic kidney disease (H)  N18.32 Albumin Random Urine Quantitative with Creat Ratio     CANCELED: Albumin Random Urine Quantitative with Creat Ratio      9. Hyperglycemia  R73.9 Hemoglobin A1c     CANCELED: Hemoglobin A1c      10. Frequent falls  R29.6 Physical Therapy  Referral     Adult Neurology  Referral      11. Balance problems  R26.89 Physical Therapy  Referral     Adult Neurology  Referral      12. Essential hypertension with goal blood pressure less than 140/90  I10 metoprolol tartrate (LOPRESSOR) 25 MG tablet     furosemide (LASIX) 20 MG tablet     spironolactone (ALDACTONE) 25 MG tablet    borderline normal, continue same medication.      "  13. Acute gout involving toe of right foot, unspecified cause  M10.9 indomethacin (INDOCIN) 25 MG capsule      14. Atopic dermatitis, unspecified type  L20.9 nystatin (MYCOSTATIN) 232058 UNIT/GM external powder      15. CKD (chronic kidney disease) stage 3, GFR 30-59 ml/min (H)  N18.30 Albumin Random Urine Quantitative with Creat Ratio     CANCELED: BASIC METABOLIC PANEL     CANCELED: Hemoglobin      16. Hyperlipidemia LDL goal <130  E78.5 Lipid panel reflex to direct LDL Non-fasting      17. Hypothyroidism due to acquired atrophy of thyroid  E03.4 TSH WITH FREE T4 REFLEX      18. Elevated glucose  R73.09 Hemoglobin A1c            Patient has been advised of split billing requirements and indicates understanding: Yes        BMI  Estimated body mass index is 27.65 kg/m  as calculated from the following:    Height as of this encounter: 1.606 m (5' 3.23\").    Weight as of this encounter: 71.3 kg (157 lb 3.2 oz).   Weight management plan: Discussed healthy diet and exercise guidelines    Counseling  Appropriate preventive services were addressed with this patient via screening, questionnaire, or discussion as appropriate for fall prevention, nutrition, physical activity, Tobacco-use cessation, social engagement, weight loss and cognition.  Checklist reviewing preventive services available has been given to the patient.  Reviewed patient's diet, addressing concerns and/or questions.   Patient is at risk for social isolation and has been provided with information about the benefit of social connection.   Updated plan of care.  Patient reported difficulty with activities of daily living were addressed today.The patient was provided with written information regarding signs of hearing loss.   Information on urinary incontinence and treatment options given to patient.       There are no Patient Instructions on file for this visit.    Sami Kam is a 84 year old, presenting for the following:  Physical        " 10/31/2024     9:29 AM   Additional Questions   Roomed by Rosmery   Accompanied by none         10/31/2024     9:29 AM   Patient Reported Additional Medications   Patient reports taking the following new medications none           HPI      Frequent falls  Dizziness/light headedness     hypertension  140's/50's-60's at home  BP Readings from Last 6 Encounters:   10/31/24 (!) 189/74   09/05/24 (!) 163/69   06/28/23 116/78   06/01/23 (!) 141/74   05/23/22 135/85   01/24/22 (!) 155/79       Hypothyroidism: Needs recheck needs medication refills  Overall well-controlled.    History of gout  Overall controlled  Needs med refills    Dermatitis  Needs med refills        Health Care Directive  Patient has a Health Care Directive on file  Advance care planning document is on file and is current.      10/31/2024   General Health   How would you rate your overall physical health? (!) FAIR   Feel stress (tense, anxious, or unable to sleep) To some extent      (!) STRESS CONCERN      10/31/2024   Nutrition   Diet: Regular (no restrictions)            10/31/2024   Exercise   Days per week of moderate/strenous exercise 0 days      (!) EXERCISE CONCERN      10/31/2024   Social Factors   Frequency of gathering with friends or relatives Never   Worry food won't last until get money to buy more No   Food not last or not have enough money for food? No   Do you have housing? (Housing is defined as stable permanent housing and does not include staying ouside in a car, in a tent, in an abandoned building, in an overnight shelter, or couch-surfing.) Yes   Are you worried about losing your housing? No   Lack of transportation? No   Unable to get utilities (heat,electricity)? No      (!) SOCIAL CONNECTIONS CONCERN      10/31/2024   Fall Risk   Fallen 2 or more times in the past year? Yes     Yes    Trouble with walking or balance? Yes     Yes    Gait Speed Test (Document in seconds) 9.65   Gait Speed Test Interpretation Greater than 5.01  seconds - ABNORMAL       Patient-reported    Multiple values from one day are sorted in reverse-chronological order          10/31/2024   Activities of Daily Living- Home Safety   Needs help with the following daily activites Telephone use    Housework    Laundry   Safety concerns in the home None of the above       Multiple values from one day are sorted in reverse-chronological order         10/31/2024   Dental   Dentist two times every year? Yes            10/31/2024   Hearing Screening   Hearing concerns? (!) I FEEL THAT PEOPLE ARE MUMBLING OR NOT SPEAKING CLEARLY.    (!) I NEED TO ASK PEOPLE TO SPEAK UP OR REPEAT THEMSELVES.    (!) IT'S HARDER TO UNDERSTAND WOMEN'S VOICES THAN MEN'S VOICES.    (!) IT'S HARD TO FOLLOW A CONVERSATION IN A NOISY RESTAURANT OR CROWDED ROOM.    (!) TROUBLE UNDERSTANDING SPEECH ON THE TELEPHONE       Multiple values from one day are sorted in reverse-chronological order         10/31/2024   Driving Risk Screening   Patient/family members have concerns about driving No            10/31/2024   General Alertness/Fatigue Screening   Have you been more tired than usual lately? No            10/31/2024   Urinary Incontinence Screening   Bothered by leaking urine in past 6 months Yes            10/31/2024   TB Screening   Were you born outside of the [place]? No            Today's PHQ-2 Score:       10/31/2024     9:10 AM   PHQ-2 ( 1999 Pfizer)   Q1: Little interest or pleasure in doing things 0    Q2: Feeling down, depressed or hopeless 0    PHQ-2 Score 0    Q1: Little interest or pleasure in doing things Not at all   Q2: Feeling down, depressed or hopeless Not at all   PHQ-2 Score 0       Patient-reported           10/31/2024   Substance Use   Alcohol more than 3/day or more than 7/wk No   Do you have a current opioid prescription? No   How severe/bad is pain from 1 to 10? 1/10   Do you use any other substances recreationally? No    (!) M30/OXY/FENTANYL PILLS    (!) OTHER       Multiple values  from one day are sorted in reverse-chronological order     Social History     Tobacco Use    Smoking status: Former     Current packs/day: 0.00     Average packs/day: 1 pack/day for 50.0 years (50.0 ttl pk-yrs)     Types: Cigarettes     Start date: [birth year]14/1952     Quit date: 3/14/2004     Years since quittin.6    Smokeless tobacco: Never   Vaping Use    Vaping status: Never Used   Substance Use Topics    Alcohol use: No     Comment: rarely,socially/1/2 months    Drug use: No                        Reviewed and updated as needed this visit by Provider                    BP Readings from Last 3 Encounters:   10/31/24 (!) 189/74   24 (!) 163/69   23 116/78    Wt Readings from Last 3 Encounters:   10/31/24 71.3 kg (157 lb 3.2 oz)   24 71.4 kg (157 lb 8 oz)   23 70.1 kg (154 lb 9.6 oz)                  Current providers sharing in care for this patient include:  Patient Care Team:  Blanche Georges MD as PCP - General (Family Medicine)  Leah Lieberman AuD as Audiologist (Audiology)  Blanche Georges MD as Physician (Family Medicine)  Blanche Georges MD as Assigned PCP  Kosta Peters MD as Assigned Surgical Provider    The following health maintenance items are reviewed in Epic and correct as of today:  Health Maintenance   Topic Date Due    URINALYSIS  Never done    DTAP/TDAP/TD IMMUNIZATION (1 - Tdap) Never done    ZOSTER IMMUNIZATION (1 of 2) Never done    RSV VACCINE (1 - 1-dose 75+ series) Never done    MICROALBUMIN  2023    A1C  2024    BMP  2024    LIPID  2024    TSH W/FREE T4 REFLEX  2024    MEDICARE ANNUAL WELLNESS VISIT  2024    ANNUAL REVIEW OF HM ORDERS  2024    HEMOGLOBIN  2024    FALL RISK ASSESSMENT  10/31/2025    ADVANCE CARE PLANNING  2028    DEXA  2032    PHQ-2 (once per calendar year)  Completed    INFLUENZA VACCINE  Completed    Pneumococcal Vaccine: 65+ Years  Completed  "   COVID-19 Vaccine  Completed    HPV IMMUNIZATION  Aged Out    MENINGITIS IMMUNIZATION  Aged Out    RSV MONOCLONAL ANTIBODY  Aged Out         Review of Systems  Constitutional, HEENT, cardiovascular, pulmonary, gi and gu systems are negative, except as otherwise noted.     Objective    Exam  BP (!) 189/74   Pulse 61   Temp 97.3  F (36.3  C) (Temporal)   Resp 16   Ht 1.606 m (5' 3.23\")   Wt 71.3 kg (157 lb 3.2 oz)   SpO2 98%   BMI 27.65 kg/m     Estimated body mass index is 27.65 kg/m  as calculated from the following:    Height as of this encounter: 1.606 m (5' 3.23\").    Weight as of this encounter: 71.3 kg (157 lb 3.2 oz).    Physical Exam  Constitutional:       General: She is not in acute distress.     Appearance: Normal appearance. She is not ill-appearing.   HENT:      Head: Normocephalic and atraumatic.      Right Ear: Tympanic membrane and ear canal normal.      Left Ear: Tympanic membrane and ear canal normal.      Nose: Nose normal.      Mouth/Throat:      Mouth: Mucous membranes are moist.      Pharynx: Oropharynx is clear.   Eyes:      General: No scleral icterus.     Extraocular Movements: Extraocular movements intact.   Cardiovascular:      Rate and Rhythm: Normal rate and regular rhythm.      Heart sounds: Normal heart sounds. No murmur heard.  Pulmonary:      Effort: Pulmonary effort is normal. No respiratory distress.      Breath sounds: Normal breath sounds. No wheezing.   Abdominal:      General: Abdomen is flat.      Palpations: Abdomen is soft.      Tenderness: There is no abdominal tenderness.   Musculoskeletal:         General: Normal range of motion.      Cervical back: Normal range of motion.   Skin:     General: Skin is warm.      Findings: No erythema or lesion.   Neurological:      General: No focal deficit present.      Mental Status: She is alert and oriented to person, place, and time.   Psychiatric:         Mood and Affect: Mood normal.         Behavior: Behavior normal.       "   Thought Content: Thought content normal.             10/31/2024   Mini Cog   Clock Draw Score 2 Normal   3 Item Recall 3 objects recalled   Mini Cog Total Score 5                 Signed Electronically by: Berhane Woods MD

## 2024-11-01 LAB
CREAT UR-MCNC: 17.8 MG/DL
FERRITIN SERPL-MCNC: 524 NG/ML (ref 11–328)
IRON BINDING CAPACITY (ROCHE): 299 UG/DL (ref 240–430)
IRON SATN MFR SERPL: 29 % (ref 15–46)
IRON SERPL-MCNC: 88 UG/DL (ref 37–145)
MICROALBUMIN UR-MCNC: 41.6 MG/L
MICROALBUMIN/CREAT UR: 233.71 MG/G CR (ref 0–25)

## 2024-11-01 NOTE — TELEPHONE ENCOUNTER
PRIOR AUTHORIZATION DENIED    Medication: INDOMETHACIN 25 MG PO CAPS  Insurance Company: [employer] - Phone [phone] Fax [phone]  Denial Date: 11/1/2024  Denial Reason(s): Try and fail other medications - meds listed below  Appeal Information: see below  Patient Notified: no

## 2025-01-17 ENCOUNTER — TRANSFERRED RECORDS (OUTPATIENT)
Dept: HEALTH INFORMATION MANAGEMENT | Facility: CLINIC | Age: 85
End: 2025-01-17
Payer: MEDICARE

## 2025-03-30 ENCOUNTER — HEALTH MAINTENANCE LETTER (OUTPATIENT)
Age: 85
End: 2025-03-30

## 2025-04-26 DIAGNOSIS — I10 ESSENTIAL HYPERTENSION WITH GOAL BLOOD PRESSURE LESS THAN 140/90: ICD-10-CM

## 2025-04-28 RX ORDER — SPIRONOLACTONE 25 MG/1
25 TABLET ORAL
Qty: 90 TABLET | Refills: 1 | Status: SHIPPED | OUTPATIENT
Start: 2025-04-28

## 2025-05-28 DIAGNOSIS — I10 ESSENTIAL HYPERTENSION WITH GOAL BLOOD PRESSURE LESS THAN 140/90: ICD-10-CM

## 2025-05-29 RX ORDER — FUROSEMIDE 20 MG/1
20 TABLET ORAL DAILY
Qty: 90 TABLET | Refills: 1 | Status: SHIPPED | OUTPATIENT
Start: 2025-05-29

## 2025-07-22 ENCOUNTER — TELEPHONE (OUTPATIENT)
Dept: AUDIOLOGY | Facility: CLINIC | Age: 85
End: 2025-07-22
Payer: MEDICARE

## 2025-07-22 DIAGNOSIS — Z96.21 COCHLEAR IMPLANT IN PLACE: Primary | ICD-10-CM

## 2025-07-22 NOTE — TELEPHONE ENCOUNTER
LVM that Leah needs the appt rescheduled to 1030 now instead of 11 to account for a longer time. Gave Audiology number

## 2025-08-04 ENCOUNTER — OFFICE VISIT (OUTPATIENT)
Dept: AUDIOLOGY | Facility: CLINIC | Age: 85
End: 2025-08-04
Payer: MEDICARE

## 2025-08-04 DIAGNOSIS — H90.3 SENSORINEURAL HEARING LOSS (SNHL) OF BOTH EARS: Primary | ICD-10-CM
